# Patient Record
Sex: MALE | Race: WHITE | NOT HISPANIC OR LATINO | Employment: OTHER | RURAL
[De-identification: names, ages, dates, MRNs, and addresses within clinical notes are randomized per-mention and may not be internally consistent; named-entity substitution may affect disease eponyms.]

---

## 2017-06-08 ENCOUNTER — OFFICE VISIT (OUTPATIENT)
Dept: ENDOCRINOLOGY | Facility: CLINIC | Age: 67
End: 2017-06-08

## 2017-06-08 VITALS
BODY MASS INDEX: 36.84 KG/M2 | DIASTOLIC BLOOD PRESSURE: 78 MMHG | HEIGHT: 73 IN | SYSTOLIC BLOOD PRESSURE: 120 MMHG | WEIGHT: 278 LBS | HEART RATE: 136 BPM

## 2017-06-08 DIAGNOSIS — E78.2 MIXED HYPERLIPIDEMIA: ICD-10-CM

## 2017-06-08 DIAGNOSIS — I10 ESSENTIAL HYPERTENSION: ICD-10-CM

## 2017-06-08 DIAGNOSIS — IMO0002 UNCONTROLLED TYPE 2 DIABETES MELLITUS WITH COMPLICATION, WITH LONG-TERM CURRENT USE OF INSULIN: Primary | ICD-10-CM

## 2017-06-08 PROCEDURE — 99214 OFFICE O/P EST MOD 30 MIN: CPT | Performed by: NURSE PRACTITIONER

## 2017-06-08 RX ORDER — AMIODARONE HYDROCHLORIDE 200 MG/1
200 TABLET ORAL 2 TIMES DAILY
COMMUNITY
Start: 2017-05-17

## 2017-06-08 RX ORDER — POTASSIUM CHLORIDE 1500 MG/1
20 TABLET, FILM COATED, EXTENDED RELEASE ORAL 2 TIMES DAILY
COMMUNITY
Start: 2017-04-17 | End: 2018-01-22 | Stop reason: SDUPTHER

## 2017-06-08 RX ORDER — RIVAROXABAN 15 MG/1
15 TABLET, FILM COATED ORAL DAILY
COMMUNITY
Start: 2017-05-17

## 2017-06-08 RX ORDER — METOLAZONE 5 MG/1
5 TABLET ORAL AS NEEDED
COMMUNITY
Start: 2017-04-17

## 2017-06-08 RX ORDER — PREDNISONE 10 MG/1
10 TABLET ORAL DAILY
COMMUNITY
Start: 2017-03-15

## 2017-06-08 RX ORDER — BUMETANIDE 2 MG/1
2 TABLET ORAL 2 TIMES DAILY
COMMUNITY
Start: 2017-04-24

## 2017-06-08 RX ORDER — CLINDAMYCIN HYDROCHLORIDE 300 MG/1
300 CAPSULE ORAL 2 TIMES DAILY
COMMUNITY
Start: 2017-06-02

## 2017-06-08 NOTE — PROGRESS NOTES
Ginger Hardwick is a 66 y.o. male. he is here today for follow-up.    History of Present Illness      Duration/Timing:Diabetes mellitus type 2, age at onset of diabetes: 50 years, onset of symptoms gradual      timing constant       Quality not controlled  But improved       Severity high    Recent hospital admission since last visit     #1 cellulitis left leg  #2 edema  #3 diarrhea     Off pump during each visit     Currently on antibiotics           Severity (Complications/Hospitalizations)  Secondary Macrovascular Complications: No CAD, No CVA, No PAD  Secondary Microvascular Complications:Diabetic nephropathy, proteinuria, diabetic neuropathy, no diabetic retinopathy     Context  Diabetes Regimen:Insulin, last HgbA1c 7.4% from July 2016  Blood Glucose Readings       Now on medtronic insulin pump       Downloaded and average bg - 155    No low sugars     Exercise:Does not execise     Associated Signs/Symptoms  Hyperglycemic Symptoms:polyuria, polydipsia, polyphagia, weight gain  Hypoglycemic Episodes:NO documented hypoglycemia since last visit; has  hypoglycemia unawareness     Aggravating , prednisone for SLE       The following portions of the patient's history were reviewed and updated as appropriate:   Past Medical History:   Diagnosis Date   • Diabetic neuropathy    • Elevated blood pressure    • Hypercholesterolemia    • Lupus erythematosus    • Tobacco user    • Type 2 diabetes mellitus with hyperglycemia    • Type 2 diabetes mellitus, uncontrolled      Past Surgical History:   Procedure Laterality Date   • CARDIAC CATHETERIZATION  03/26/2014    No epicardial coronary artery disease. Normal LV systolic function. EF 55%. No wall motion abnormalities. Systemic hypertension.     Family History   Problem Relation Age of Onset   • Diabetes Other        Current Outpatient Prescriptions   Medication Sig Dispense Refill   • amiodarone (PACERONE) 200 MG tablet Take 200 mg by mouth 2 (Two) Times a Day.      • aspirin 81 MG chewable tablet Chew 81 mg daily.     • atorvastatin (LIPITOR) 80 MG tablet      • budesonide-formoterol (SYMBICORT) 160-4.5 MCG/ACT inhaler Inhale 2 puffs 2 (two) times a day.     • bumetanide (BUMEX) 2 MG tablet Take 2 mg by mouth 2 (Two) Times a Day.     • carvedilol (COREG) 25 MG tablet Take 25 mg by mouth 2 (two) times a day with meals.     • cetirizine (ZyrTEC) 10 MG tablet Take 10 mg by mouth daily.     • clindamycin (CLEOCIN) 300 MG capsule Take 300 mg by mouth 2 (Two) Times a Day.     • cloNIDine (CATAPRES) 0.2 MG tablet Take 0.2 mg by mouth 2 (two) times a day.     • esomeprazole (nexIUM) 20 MG capsule Take 20 mg by mouth Daily.     • fenofibrate 160 MG tablet      • furosemide (LASIX) 40 MG tablet Take 40 mg by mouth daily.     • HYDROcodone-acetaminophen (NORCO) 7.5-325 MG per tablet      • insulin detemir (LEVEMIR) 100 UNIT/ML injection 50 units daily 2 each 11   • insulin lispro (humaLOG) 100 UNIT/ML injection Use 150 units thru insulin pump per day 50 mL 11   • LYRICA 150 MG capsule Take 150 capsules by mouth 2 (Two) Times a Day.     • LYRICA 75 MG capsule Take 75 mg by mouth 2 (Two) Times a Day.     • metolazone (ZAROXOLYN) 2.5 MG tablet Take 2.5 mg by mouth.     • metOLazone (ZAROXOLYN) 5 MG tablet Take 5 mg by mouth As Needed.     • Multiple Vitamins-Minerals (DAILY MULTIVITAMIN PO) Take 1 capsule by mouth daily.     • mycophenolate (CELLCEPT) 500 MG tablet Take  by mouth 3 (three) times a day.     • Omega-3 Fatty Acids (FISH OIL) 1000 MG capsule capsule Take 1,000 mg by mouth 2 (two) times a day.     • potassium chloride (K-DUR) 10 MEQ CR tablet      • potassium chloride (K-DUR,KLOR-CON) 10 MEQ CR tablet      • potassium chloride (MICRO-K) 10 MEQ CR capsule Take 10 mEq by mouth 2 (two) times a day.     • potassium chloride ER (K-TAB) 20 MEQ tablet controlled-release ER tablet Take 20 mEq by mouth 2 (Two) Times a Day.     • predniSONE (DELTASONE) 10 MG tablet Take 10 mg by mouth  "Daily.     • predniSONE (DELTASONE) 5 MG tablet Take 5 mg by mouth daily.     • PROAIR  (90 BASE) MCG/ACT inhaler      • SYMBICORT 80-4.5 MCG/ACT inhaler      • tamsulosin (FLOMAX) 0.4 MG capsule 24 hr capsule Take 1 capsule by mouth every night.     • TRUEPLUS INSULIN SYRINGE 31G X 5/16\" 1 ML misc      • XARELTO 15 MG tablet Take 15 mg by mouth Daily.     • ZETIA 10 MG tablet      • zolpidem (AMBIEN) 10 MG tablet        No current facility-administered medications for this visit.      No Known Allergies  Social History     Social History   • Marital status:      Spouse name: N/A   • Number of children: N/A   • Years of education: N/A     Social History Main Topics   • Smoking status: Current Every Day Smoker   • Smokeless tobacco: Never Used   • Alcohol use No   • Drug use: None   • Sexual activity: Not Asked     Other Topics Concern   • None     Social History Narrative       Review of Systems  Review of Systems   Constitutional: Negative for activity change, appetite change, chills, diaphoresis and fatigue.   HENT: Negative for congestion, dental problem, drooling, ear discharge, ear pain, facial swelling, sneezing, sore throat, tinnitus, trouble swallowing and voice change.    Eyes: Negative for photophobia, pain, discharge, redness, itching and visual disturbance.   Respiratory: Negative for apnea, cough, choking, chest tightness and shortness of breath.    Cardiovascular: Negative for chest pain, palpitations and leg swelling.   Gastrointestinal: Negative for abdominal distention, abdominal pain, constipation, diarrhea, nausea and vomiting.   Endocrine: Negative for cold intolerance, heat intolerance, polydipsia, polyphagia and polyuria.   Genitourinary: Negative for difficulty urinating, dysuria, frequency, hematuria and urgency.   Musculoskeletal: Negative for arthralgias, back pain, gait problem, joint swelling, myalgias, neck pain and neck stiffness.   Skin: Negative for color change, pallor, " "rash and wound.   Allergic/Immunologic: Negative for environmental allergies, food allergies and immunocompromised state.   Neurological: Negative for dizziness, tremors, facial asymmetry, weakness, light-headedness, numbness and headaches.   Hematological: Negative for adenopathy. Does not bruise/bleed easily.   Psychiatric/Behavioral: Negative for agitation, behavioral problems, confusion, decreased concentration and sleep disturbance.        Objective    /78 (BP Location: Left arm, Patient Position: Sitting, Cuff Size: Adult)  Pulse (!) 136  Ht 73\" (185.4 cm)  Wt 278 lb (126 kg)  BMI 36.68 kg/m2  Physical Exam   Constitutional: He is oriented to person, place, and time. He appears well-developed and well-nourished. No distress.   HENT:   Head: Normocephalic and atraumatic.   Right Ear: External ear normal.   Left Ear: External ear normal.   Nose: Nose normal.   Eyes: Conjunctivae and EOM are normal. Pupils are equal, round, and reactive to light.   Neck: Normal range of motion. Neck supple. No tracheal deviation present. No thyromegaly present.   Cardiovascular: Normal rate, regular rhythm and normal heart sounds.    No murmur heard.  Pulmonary/Chest: Effort normal and breath sounds normal. No respiratory distress. He has no wheezes.   Abdominal: Soft. Bowel sounds are normal. There is no tenderness. There is no rebound and no guarding.   Musculoskeletal: Normal range of motion. He exhibits no edema, tenderness or deformity.   Neurological: He is alert and oriented to person, place, and time. No cranial nerve deficit.   Skin: Skin is warm and dry. No rash noted.   Psychiatric: He has a normal mood and affect. His behavior is normal. Judgment and thought content normal.       Lab Review  Glucose (mg/dl)   Date Value   07/11/2016 148 (H)   03/26/2014 101 (H)   03/25/2014 135 (H)     Sodium (mmol/L)   Date Value   07/11/2016 139   03/26/2014 138   03/25/2014 138     Potassium (mmol/L)   Date Value "   07/11/2016 3.8   03/26/2014 3.9   03/25/2014 3.6     Chloride (mmol/L)   Date Value   07/11/2016 97   03/26/2014 101   03/25/2014 97     CO2 (mmol/L)   Date Value   07/11/2016 35 (H)   03/26/2014 31   03/25/2014 32 (H)     BUN (mg/dl)   Date Value   07/11/2016 43 (H)   03/26/2014 22 (H)   03/25/2014 25 (H)     Creatinine (mg/dl)   Date Value   07/11/2016 1.5 (H)   03/26/2014 1.4 (H)   03/25/2014 1.5 (H)     Hemoglobin A1C (%TotHgb)   Date Value   07/11/2016 7.4 (H)     Triglycerides (mg/dl)   Date Value   07/11/2016 357 (H)   03/26/2014 371 (H)       Assessment/Plan      1. Uncontrolled type 2 diabetes mellitus with complication, with long-term current use of insulin    2. Mixed hyperlipidemia    3. Essential hypertension    .    Medications prescribed:  Outpatient Encounter Prescriptions as of 6/8/2017   Medication Sig Dispense Refill   • amiodarone (PACERONE) 200 MG tablet Take 200 mg by mouth 2 (Two) Times a Day.     • aspirin 81 MG chewable tablet Chew 81 mg daily.     • atorvastatin (LIPITOR) 80 MG tablet      • budesonide-formoterol (SYMBICORT) 160-4.5 MCG/ACT inhaler Inhale 2 puffs 2 (two) times a day.     • bumetanide (BUMEX) 2 MG tablet Take 2 mg by mouth 2 (Two) Times a Day.     • carvedilol (COREG) 25 MG tablet Take 25 mg by mouth 2 (two) times a day with meals.     • cetirizine (ZyrTEC) 10 MG tablet Take 10 mg by mouth daily.     • clindamycin (CLEOCIN) 300 MG capsule Take 300 mg by mouth 2 (Two) Times a Day.     • cloNIDine (CATAPRES) 0.2 MG tablet Take 0.2 mg by mouth 2 (two) times a day.     • esomeprazole (nexIUM) 20 MG capsule Take 20 mg by mouth Daily.     • fenofibrate 160 MG tablet      • furosemide (LASIX) 40 MG tablet Take 40 mg by mouth daily.     • HYDROcodone-acetaminophen (NORCO) 7.5-325 MG per tablet      • insulin detemir (LEVEMIR) 100 UNIT/ML injection 50 units daily 2 each 11   • insulin lispro (humaLOG) 100 UNIT/ML injection Use 150 units thru insulin pump per day 50 mL 11   • LYRICA  "150 MG capsule Take 150 capsules by mouth 2 (Two) Times a Day.     • LYRICA 75 MG capsule Take 75 mg by mouth 2 (Two) Times a Day.     • metolazone (ZAROXOLYN) 2.5 MG tablet Take 2.5 mg by mouth.     • metOLazone (ZAROXOLYN) 5 MG tablet Take 5 mg by mouth As Needed.     • Multiple Vitamins-Minerals (DAILY MULTIVITAMIN PO) Take 1 capsule by mouth daily.     • mycophenolate (CELLCEPT) 500 MG tablet Take  by mouth 3 (three) times a day.     • Omega-3 Fatty Acids (FISH OIL) 1000 MG capsule capsule Take 1,000 mg by mouth 2 (two) times a day.     • potassium chloride (K-DUR) 10 MEQ CR tablet      • potassium chloride (K-DUR,KLOR-CON) 10 MEQ CR tablet      • potassium chloride (MICRO-K) 10 MEQ CR capsule Take 10 mEq by mouth 2 (two) times a day.     • potassium chloride ER (K-TAB) 20 MEQ tablet controlled-release ER tablet Take 20 mEq by mouth 2 (Two) Times a Day.     • predniSONE (DELTASONE) 10 MG tablet Take 10 mg by mouth Daily.     • predniSONE (DELTASONE) 5 MG tablet Take 5 mg by mouth daily.     • PROAIR  (90 BASE) MCG/ACT inhaler      • SYMBICORT 80-4.5 MCG/ACT inhaler      • tamsulosin (FLOMAX) 0.4 MG capsule 24 hr capsule Take 1 capsule by mouth every night.     • TRUEPLUS INSULIN SYRINGE 31G X 5/16\" 1 ML misc      • XARELTO 15 MG tablet Take 15 mg by mouth Daily.     • ZETIA 10 MG tablet      • zolpidem (AMBIEN) 10 MG tablet        No facility-administered encounter medications on file as of 6/8/2017.        Orders placed during this encounter include:  No orders of the defined types were placed in this encounter.    Glycemic Management        Now on Medtronic insulin pump         BASAL -        1.9 -- 2 u per hour         Carb ratio     5 - change to 4.5        Sensitivity         20        Target BG        120 to 140        Previous regimen          Levemir 40 units and 45 units pm          Humalog  1 unit per every 5 grams of CHO      Lipid Management        No results found for: CHOL        Lab Results "   Component Value Date     TRIG 357 (H) 07/11/2016     TRIG 371 (H) 03/26/2014            Lab Results   Component Value Date     HDL 37 (L) 07/11/2016     HDL 26 (L) 03/26/2014            Lab Results   Component Value Date     LDLCALC 62 07/11/2016     LDLCALC 41 03/26/2014            Simvastatin  80 mg qhs    triglice  160 mg daily   zetia 10 mg daily     Blood Pressure Management:target blood pressure            Lab Results   Component Value Date     GLUCOSE 148 (H) 07/11/2016     CALCIUM 9.2 07/11/2016      07/11/2016     K 3.8 07/11/2016     CO2 35 (H) 07/11/2016     CL 97 07/11/2016     BUN 43 (H) 07/11/2016     CREATININE 1.5 (H) 07/11/2016     ANIONGAP 7.0 07/11/2016            Lab Results   Component Value Date     GLUCOSE 148 (H) 07/11/2016     BUN 43 (H) 07/11/2016     CREATININE 1.5 (H) 07/11/2016     CO2 35 (H) 07/11/2016     CALCIUM 9.2 07/11/2016     ALBUMIN 3.1 (L) 07/11/2016     AST 33 07/11/2016     ALT 52 07/11/2016            on clonidine , lasix and coreg      follows with nephrology       On metolazone 2.5 mg three times weekly      On lasix 40 mg daily      Wheezing, not smoking     Take lasix 40 bid for 3 days      Microvascular Complication Monitoring: Microalbuminuria, No Diabetic Retinopathy, Diabetic Neuropathy       CKD stage III, nephrotic sx       Immunizations: Last pneumococcal immunization 2016  Preventive Care:patient is not smoking   Weight Related:Obesity , counseled nutrition, and physical activity   Other Diabetes Related Aspects               Lab Results   Component Value Date     TSH 1.76 07/11/2016            Lab Results   Component Value Date     [id number] 269 07/11/2016                4. Follow-up: Return in about 3 months (around 9/8/2017) for Recheck.

## 2017-09-22 ENCOUNTER — OFFICE VISIT (OUTPATIENT)
Dept: ENDOCRINOLOGY | Facility: CLINIC | Age: 67
End: 2017-09-22

## 2017-09-22 VITALS
SYSTOLIC BLOOD PRESSURE: 124 MMHG | BODY MASS INDEX: 36.45 KG/M2 | WEIGHT: 275 LBS | HEIGHT: 73 IN | HEART RATE: 88 BPM | DIASTOLIC BLOOD PRESSURE: 90 MMHG

## 2017-09-22 DIAGNOSIS — IMO0002 UNCONTROLLED TYPE 2 DIABETES MELLITUS WITH COMPLICATION, WITH LONG-TERM CURRENT USE OF INSULIN: Primary | ICD-10-CM

## 2017-09-22 DIAGNOSIS — E78.2 MIXED HYPERLIPIDEMIA: ICD-10-CM

## 2017-09-22 DIAGNOSIS — I10 ESSENTIAL HYPERTENSION: ICD-10-CM

## 2017-09-22 PROCEDURE — 99214 OFFICE O/P EST MOD 30 MIN: CPT | Performed by: NURSE PRACTITIONER

## 2017-09-22 PROCEDURE — PTNOCHG PR CUSTOM PT NO CHARGE VISIT: Performed by: NURSE PRACTITIONER

## 2017-09-22 RX ORDER — LOSARTAN POTASSIUM 100 MG/1
TABLET ORAL
Refills: 11 | COMMUNITY
Start: 2017-08-14 | End: 2018-04-23 | Stop reason: DRUGHIGH

## 2017-09-22 RX ORDER — DULOXETIN HYDROCHLORIDE 30 MG/1
CAPSULE, DELAYED RELEASE ORAL
COMMUNITY
Start: 2017-09-07

## 2017-09-22 RX ORDER — ERGOCALCIFEROL 1.25 MG/1
CAPSULE ORAL
Refills: 12 | COMMUNITY
Start: 2017-08-23 | End: 2018-08-31 | Stop reason: SDUPTHER

## 2017-09-22 NOTE — PROGRESS NOTES
Ginger Hardwick is a 66 y.o. male. he is here today for follow-up.    History of Present Illness       Duration/Timing:Diabetes mellitus type 2, age at onset of diabetes: 50 years, onset of symptoms gradual      timing constant       Quality not controlled  But improved       Severity high     Recent hospital admission since last visit      #1 cellulitis left leg  #2 edema  #3 diarrhea      Off pump during each visit      Currently on antibiotics             Severity (Complications/Hospitalizations)  Secondary Macrovascular Complications: No CAD, No CVA, No PAD  Secondary Microvascular Complications:Diabetic nephropathy, proteinuria, diabetic neuropathy, no diabetic retinopathy     Context  Diabetes Regimen:Insulin, last HgbA1c 7.4% from July 2016  Blood Glucose Readings       Now on medtronic insulin pump       Downloaded and average bg - 154     Waking up 139 up to 180 s      Exercise:Does not execise     Associated Signs/Symptoms  Hyperglycemic Symptoms:polyuria, polydipsia, polyphagia, weight gain  Hypoglycemic Episodes:NO documented hypoglycemia since last visit; has  hypoglycemia unawareness     Aggravating , prednisone for SLE             The following portions of the patient's history were reviewed and updated as appropriate:   Past Medical History:   Diagnosis Date   • Diabetic neuropathy    • Elevated blood pressure    • Hypercholesterolemia    • Lupus erythematosus    • Tobacco user    • Type 2 diabetes mellitus with hyperglycemia    • Type 2 diabetes mellitus, uncontrolled      Past Surgical History:   Procedure Laterality Date   • CARDIAC CATHETERIZATION  03/26/2014    No epicardial coronary artery disease. Normal LV systolic function. EF 55%. No wall motion abnormalities. Systemic hypertension.     Family History   Problem Relation Age of Onset   • Diabetes Other        Current Outpatient Prescriptions   Medication Sig Dispense Refill   • amiodarone (PACERONE) 200 MG tablet Take 200 mg by  mouth 2 (Two) Times a Day.     • aspirin 81 MG chewable tablet Chew 81 mg daily.     • atorvastatin (LIPITOR) 80 MG tablet      • budesonide-formoterol (SYMBICORT) 160-4.5 MCG/ACT inhaler Inhale 2 puffs 2 (two) times a day.     • bumetanide (BUMEX) 2 MG tablet Take 2 mg by mouth 2 (Two) Times a Day.     • carvedilol (COREG) 25 MG tablet Take 25 mg by mouth 2 (two) times a day with meals.     • cetirizine (ZyrTEC) 10 MG tablet Take 10 mg by mouth daily.     • clindamycin (CLEOCIN) 300 MG capsule Take 300 mg by mouth 2 (Two) Times a Day.     • cloNIDine (CATAPRES) 0.2 MG tablet Take 0.2 mg by mouth 2 (two) times a day.     • DULoxetine (CYMBALTA) 30 MG capsule      • esomeprazole (nexIUM) 20 MG capsule Take 20 mg by mouth Daily.     • fenofibrate 160 MG tablet      • furosemide (LASIX) 40 MG tablet Take 40 mg by mouth daily.     • HYDROcodone-acetaminophen (NORCO) 7.5-325 MG per tablet      • insulin detemir (LEVEMIR) 100 UNIT/ML injection 50 units daily 2 each 11   • insulin lispro (humaLOG) 100 UNIT/ML injection Use 150 units thru insulin pump per day 50 mL 11   • losartan (COZAAR) 100 MG tablet TAKE 1 TABLET BY MOUTH EVERY DAY  11   • LYRICA 150 MG capsule Take 150 capsules by mouth 2 (Two) Times a Day.     • LYRICA 75 MG capsule Take 75 mg by mouth 2 (Two) Times a Day.     • metolazone (ZAROXOLYN) 2.5 MG tablet Take 2.5 mg by mouth.     • metOLazone (ZAROXOLYN) 5 MG tablet Take 5 mg by mouth As Needed.     • metoprolol tartrate (LOPRESSOR) 25 MG tablet      • Multiple Vitamins-Minerals (DAILY MULTIVITAMIN PO) Take 1 capsule by mouth daily.     • mycophenolate (CELLCEPT) 500 MG tablet Take  by mouth 3 (three) times a day.     • Omega-3 Fatty Acids (FISH OIL) 1000 MG capsule capsule Take 1,000 mg by mouth 2 (two) times a day.     • potassium chloride (K-DUR) 10 MEQ CR tablet      • potassium chloride (K-DUR,KLOR-CON) 10 MEQ CR tablet      • potassium chloride (MICRO-K) 10 MEQ CR capsule Take 10 mEq by mouth 2 (two)  "times a day.     • potassium chloride ER (K-TAB) 20 MEQ tablet controlled-release ER tablet Take 20 mEq by mouth 2 (Two) Times a Day.     • predniSONE (DELTASONE) 10 MG tablet Take 10 mg by mouth Daily.     • predniSONE (DELTASONE) 5 MG tablet Take 5 mg by mouth daily.     • PROAIR  (90 BASE) MCG/ACT inhaler      • SYMBICORT 80-4.5 MCG/ACT inhaler      • tamsulosin (FLOMAX) 0.4 MG capsule 24 hr capsule Take 1 capsule by mouth every night.     • TRUEPLUS INSULIN SYRINGE 31G X 5/16\" 1 ML misc      • vitamin D (ERGOCALCIFEROL) 12992 units capsule capsule TAKE ONE CAPSULE BY MOUTH EVERY WEEK -SWALLOW WHOLE. DO NOT CHEW ORCRUSH.  12   • XARELTO 15 MG tablet Take 15 mg by mouth Daily.     • ZETIA 10 MG tablet      • zolpidem (AMBIEN) 10 MG tablet        No current facility-administered medications for this visit.      No Known Allergies  Social History     Social History   • Marital status:      Spouse name: N/A   • Number of children: N/A   • Years of education: N/A     Social History Main Topics   • Smoking status: Current Every Day Smoker   • Smokeless tobacco: Never Used      Comment: encouraged smoking cessation    • Alcohol use No   • Drug use: None   • Sexual activity: Not Asked     Other Topics Concern   • None     Social History Narrative       Review of Systems  Review of Systems   Constitutional: Negative for activity change, appetite change, diaphoresis and fatigue.   HENT: Negative for congestion, dental problem, drooling, facial swelling, sneezing, sore throat, tinnitus, trouble swallowing and voice change.    Eyes: Negative for photophobia, pain, discharge, redness, itching and visual disturbance.   Respiratory: Negative for apnea, cough, choking, chest tightness and shortness of breath.    Cardiovascular: Negative for chest pain, palpitations and leg swelling.   Gastrointestinal: Negative for abdominal distention, abdominal pain, constipation, diarrhea, nausea and vomiting.   Endocrine: " "Negative for cold intolerance, heat intolerance, polydipsia, polyphagia and polyuria.   Genitourinary: Negative for difficulty urinating, dysuria, frequency, hematuria and urgency.   Musculoskeletal: Negative for arthralgias, back pain, gait problem, joint swelling, myalgias, neck pain and neck stiffness.   Skin: Negative for color change, pallor, rash and wound.   Allergic/Immunologic: Negative for environmental allergies and food allergies.   Neurological: Negative for dizziness, tremors, facial asymmetry, weakness, light-headedness, numbness and headaches.   Hematological: Negative for adenopathy. Does not bruise/bleed easily.   Psychiatric/Behavioral: Negative for agitation, behavioral problems, confusion and sleep disturbance.        Objective    /90 (BP Location: Right arm, Patient Position: Sitting, Cuff Size: Adult)  Pulse 88  Ht 73\" (185.4 cm)  Wt 275 lb (125 kg)  BMI 36.28 kg/m2  Physical Exam   Constitutional: He is oriented to person, place, and time. He appears well-developed and well-nourished. No distress.   HENT:   Head: Normocephalic and atraumatic.   Right Ear: External ear normal.   Left Ear: External ear normal.   Nose: Nose normal.   Eyes: Conjunctivae and EOM are normal. Pupils are equal, round, and reactive to light.   Neck: Normal range of motion. Neck supple. No tracheal deviation present. No thyromegaly present.   Cardiovascular: Normal rate, regular rhythm and normal heart sounds.    No murmur heard.  Pulmonary/Chest: Effort normal and breath sounds normal. No respiratory distress. He has no wheezes.   Abdominal: Soft. Bowel sounds are normal. There is no tenderness. There is no rebound and no guarding.   Musculoskeletal: Normal range of motion. He exhibits no edema, tenderness or deformity.   Neurological: He is alert and oriented to person, place, and time. No cranial nerve deficit.   Skin: Skin is warm and dry. No rash noted.   Psychiatric: He has a normal mood and affect. His " behavior is normal. Judgment and thought content normal.       Lab Review  Glucose (mg/dl)   Date Value   07/11/2016 148 (H)   03/26/2014 101 (H)   03/25/2014 135 (H)     Sodium (mmol/L)   Date Value   07/11/2016 139   03/26/2014 138   03/25/2014 138     Potassium (mmol/L)   Date Value   07/11/2016 3.8   03/26/2014 3.9   03/25/2014 3.6     Chloride (mmol/L)   Date Value   07/11/2016 97   03/26/2014 101   03/25/2014 97     CO2 (mmol/L)   Date Value   07/11/2016 35 (H)   03/26/2014 31   03/25/2014 32 (H)     BUN (mg/dl)   Date Value   07/11/2016 43 (H)   03/26/2014 22 (H)   03/25/2014 25 (H)     Creatinine (mg/dl)   Date Value   07/11/2016 1.5 (H)   03/26/2014 1.4 (H)   03/25/2014 1.5 (H)     Hemoglobin A1C (%TotHgb)   Date Value   07/11/2016 7.4 (H)     Triglycerides (mg/dl)   Date Value   07/11/2016 357 (H)   03/26/2014 371 (H)       Assessment/Plan      1. Uncontrolled type 2 diabetes mellitus with complication, with long-term current use of insulin    2. Mixed hyperlipidemia    3. Essential hypertension    .    Medications prescribed:  Outpatient Encounter Prescriptions as of 9/22/2017   Medication Sig Dispense Refill   • amiodarone (PACERONE) 200 MG tablet Take 200 mg by mouth 2 (Two) Times a Day.     • aspirin 81 MG chewable tablet Chew 81 mg daily.     • atorvastatin (LIPITOR) 80 MG tablet      • budesonide-formoterol (SYMBICORT) 160-4.5 MCG/ACT inhaler Inhale 2 puffs 2 (two) times a day.     • bumetanide (BUMEX) 2 MG tablet Take 2 mg by mouth 2 (Two) Times a Day.     • carvedilol (COREG) 25 MG tablet Take 25 mg by mouth 2 (two) times a day with meals.     • cetirizine (ZyrTEC) 10 MG tablet Take 10 mg by mouth daily.     • clindamycin (CLEOCIN) 300 MG capsule Take 300 mg by mouth 2 (Two) Times a Day.     • cloNIDine (CATAPRES) 0.2 MG tablet Take 0.2 mg by mouth 2 (two) times a day.     • DULoxetine (CYMBALTA) 30 MG capsule      • esomeprazole (nexIUM) 20 MG capsule Take 20 mg by mouth Daily.     • fenofibrate  "160 MG tablet      • furosemide (LASIX) 40 MG tablet Take 40 mg by mouth daily.     • HYDROcodone-acetaminophen (NORCO) 7.5-325 MG per tablet      • insulin detemir (LEVEMIR) 100 UNIT/ML injection 50 units daily 2 each 11   • insulin lispro (humaLOG) 100 UNIT/ML injection Use 150 units thru insulin pump per day 50 mL 11   • losartan (COZAAR) 100 MG tablet TAKE 1 TABLET BY MOUTH EVERY DAY  11   • LYRICA 150 MG capsule Take 150 capsules by mouth 2 (Two) Times a Day.     • LYRICA 75 MG capsule Take 75 mg by mouth 2 (Two) Times a Day.     • metolazone (ZAROXOLYN) 2.5 MG tablet Take 2.5 mg by mouth.     • metOLazone (ZAROXOLYN) 5 MG tablet Take 5 mg by mouth As Needed.     • metoprolol tartrate (LOPRESSOR) 25 MG tablet      • Multiple Vitamins-Minerals (DAILY MULTIVITAMIN PO) Take 1 capsule by mouth daily.     • mycophenolate (CELLCEPT) 500 MG tablet Take  by mouth 3 (three) times a day.     • Omega-3 Fatty Acids (FISH OIL) 1000 MG capsule capsule Take 1,000 mg by mouth 2 (two) times a day.     • potassium chloride (K-DUR) 10 MEQ CR tablet      • potassium chloride (K-DUR,KLOR-CON) 10 MEQ CR tablet      • potassium chloride (MICRO-K) 10 MEQ CR capsule Take 10 mEq by mouth 2 (two) times a day.     • potassium chloride ER (K-TAB) 20 MEQ tablet controlled-release ER tablet Take 20 mEq by mouth 2 (Two) Times a Day.     • predniSONE (DELTASONE) 10 MG tablet Take 10 mg by mouth Daily.     • predniSONE (DELTASONE) 5 MG tablet Take 5 mg by mouth daily.     • PROAIR  (90 BASE) MCG/ACT inhaler      • SYMBICORT 80-4.5 MCG/ACT inhaler      • tamsulosin (FLOMAX) 0.4 MG capsule 24 hr capsule Take 1 capsule by mouth every night.     • TRUEPLUS INSULIN SYRINGE 31G X 5/16\" 1 ML misc      • vitamin D (ERGOCALCIFEROL) 38879 units capsule capsule TAKE ONE CAPSULE BY MOUTH EVERY WEEK -SWALLOW WHOLE. DO NOT CHEW ORCRUSH.  12   • XARELTO 15 MG tablet Take 15 mg by mouth Daily.     • ZETIA 10 MG tablet      • zolpidem (AMBIEN) 10 MG " tablet        No facility-administered encounter medications on file as of 9/22/2017.        Orders placed during this encounter include:  No orders of the defined types were placed in this encounter.    Glycemic Management        Now on [employer] insulin pump         BASAL -     MN - 2.1  8 am - 2.0            Carb ratio     5 - change to 4.5        Sensitivity         20        Target BG       120 to 140        Previous regimen          Levemir 40 units and 45 units pm          Humalog  1 unit per every 5 grams of CHO      Lipid Management        No results found for: CHOL            Lab Results   Component Value Date     TRIG 357 (H) 07/11/2016     TRIG 371 (H) 03/26/2014                Lab Results   Component Value Date     HDL 37 (L) 07/11/2016     HDL 26 (L) 03/26/2014                Lab Results   Component Value Date     LDLCALC 62 07/11/2016     LDLCALC 41 03/26/2014            Simvastatin  80 mg qhs    triglice  160 mg daily   zetia 10 mg daily     Blood Pressure Management:target blood pressure                Lab Results   Component Value Date     GLUCOSE 148 (H) 07/11/2016     CALCIUM 9.2 07/11/2016      07/11/2016     K 3.8 07/11/2016     CO2 35 (H) 07/11/2016     CL 97 07/11/2016     BUN 43 (H) 07/11/2016     CREATININE 1.5 (H) 07/11/2016     ANIONGAP 7.0 07/11/2016                Lab Results   Component Value Date     GLUCOSE 148 (H) 07/11/2016     BUN 43 (H) 07/11/2016     CREATININE 1.5 (H) 07/11/2016     CO2 35 (H) 07/11/2016     CALCIUM 9.2 07/11/2016     ALBUMIN 3.1 (L) 07/11/2016     AST 33 07/11/2016     ALT 52 07/11/2016            on clonidine , lasix and coreg      follows with nephrology       On metolazone 2.5 mg three times weekly      On lasix 40 mg daily      Wheezing, not smoking     Take lasix 40 bid for 3 days      Microvascular Complication Monitoring: Microalbuminuria, No Diabetic Retinopathy, Diabetic Neuropathy       CKD stage III, nephrotic sx      Immunizations: Last  pneumococcal immunization 2016  Preventive Care:patient is not smoking   Weight Related:Obesity , counseled nutrition, and physical activity   Other Diabetes Related Aspects                   Lab Results   Component Value Date     TSH 1.76 07/11/2016                Lab Results   Component Value Date     [id number] 269 07/11/2016               4. Follow-up: Return in about 3 months (around 12/22/2017) for Recheck.

## 2017-09-22 NOTE — PROGRESS NOTES
Jovan Hardwick is a 66 y.o. male seen by diabetes educator 09/22/2017 to link glucometer to insulin pump. Was unable to link meter to pump. Called [employer]--completed troubleshooting. [employer] to send patient new glucometer. Provided patient with written instructions on how to link new meter to his pump.     Samantha Lyons MS, RD, LD, CDE

## 2018-01-10 ENCOUNTER — TELEPHONE (OUTPATIENT)
Dept: FAMILY MEDICINE CLINIC | Facility: CLINIC | Age: 68
End: 2018-01-10

## 2018-01-11 ENCOUNTER — TELEPHONE (OUTPATIENT)
Dept: ENDOCRINOLOGY | Facility: CLINIC | Age: 68
End: 2018-01-11

## 2018-01-19 DIAGNOSIS — IMO0002 UNCONTROLLED TYPE 2 DIABETES MELLITUS WITH COMPLICATION, WITH LONG-TERM CURRENT USE OF INSULIN: Primary | ICD-10-CM

## 2018-01-22 ENCOUNTER — OFFICE VISIT (OUTPATIENT)
Dept: ENDOCRINOLOGY | Facility: CLINIC | Age: 68
End: 2018-01-22

## 2018-01-22 VITALS
SYSTOLIC BLOOD PRESSURE: 126 MMHG | WEIGHT: 277 LBS | HEART RATE: 106 BPM | HEIGHT: 73 IN | DIASTOLIC BLOOD PRESSURE: 78 MMHG | BODY MASS INDEX: 36.71 KG/M2

## 2018-01-22 DIAGNOSIS — E78.2 MIXED HYPERLIPIDEMIA: ICD-10-CM

## 2018-01-22 DIAGNOSIS — IMO0002 UNCONTROLLED TYPE 2 DIABETES MELLITUS WITH COMPLICATION, WITH LONG-TERM CURRENT USE OF INSULIN: Primary | ICD-10-CM

## 2018-01-22 DIAGNOSIS — I10 ESSENTIAL HYPERTENSION: ICD-10-CM

## 2018-01-22 PROCEDURE — 99214 OFFICE O/P EST MOD 30 MIN: CPT | Performed by: NURSE PRACTITIONER

## 2018-01-22 NOTE — PROGRESS NOTES
Ginger Hardwick is a 67 y.o. male. he is here today for follow-up.    History of Present Illness       Duration/Timing:Diabetes mellitus type 2, age at onset of diabetes: 50 years, onset of symptoms gradual      timing constant       Quality not controlled  But improved       Severity high     Recent hospital admission since last visit      #1 cellulitis left leg  #2 edema  #3 diarrhea      Off pump during each visit      Currently on antibiotics             Severity (Complications/Hospitalizations)  Secondary Macrovascular Complications: No CAD, No CVA, No PAD  Secondary Microvascular Complications:Diabetic nephropathy, proteinuria, diabetic neuropathy, no diabetic retinopathy     Context  Diabetes Regimen:Insulin, last HgbA1c 7.7% Jan. 2018   Blood Glucose Readings       Now on medtronic insulin pump       Downloaded and average bg - 155     No low blood sugars      Exercise:Does not execise     Associated Signs/Symptoms  Hyperglycemic Symptoms:polyuria, polydipsia, polyphagia, weight gain  Hypoglycemic Episodes:NO documented hypoglycemia since last visit; has  hypoglycemia unawareness     Aggravating , prednisone for SLE             The following portions of the patient's history were reviewed and updated as appropriate:   Past Medical History:   Diagnosis Date   • Diabetic neuropathy    • Elevated blood pressure    • Hypercholesterolemia    • Lupus erythematosus    • Tobacco user    • Type 2 diabetes mellitus with hyperglycemia    • Type 2 diabetes mellitus, uncontrolled      Past Surgical History:   Procedure Laterality Date   • CARDIAC CATHETERIZATION  03/26/2014    No epicardial coronary artery disease. Normal LV systolic function. EF 55%. No wall motion abnormalities. Systemic hypertension.     Family History   Problem Relation Age of Onset   • Diabetes Other        Current Outpatient Prescriptions   Medication Sig Dispense Refill   • amiodarone (PACERONE) 200 MG tablet Take 200 mg by mouth 2  "(Two) Times a Day.     • aspirin 81 MG chewable tablet Chew 81 mg daily.     • atorvastatin (LIPITOR) 80 MG tablet      • budesonide-formoterol (SYMBICORT) 160-4.5 MCG/ACT inhaler Inhale 2 puffs 2 (two) times a day.     • bumetanide (BUMEX) 2 MG tablet Take 2 mg by mouth 2 (Two) Times a Day.     • carvedilol (COREG) 25 MG tablet Take 25 mg by mouth 2 (two) times a day with meals.     • cetirizine (ZyrTEC) 10 MG tablet Take 10 mg by mouth daily.     • clindamycin (CLEOCIN) 300 MG capsule Take 300 mg by mouth 2 (Two) Times a Day.     • cloNIDine (CATAPRES) 0.2 MG tablet Take 0.2 mg by mouth 2 (two) times a day.     • DULoxetine (CYMBALTA) 30 MG capsule      • esomeprazole (nexIUM) 20 MG capsule Take 20 mg by mouth Daily.     • fenofibrate 160 MG tablet      • furosemide (LASIX) 40 MG tablet Take 40 mg by mouth daily.     • HUMALOG 100 UNIT/ML injection 150 UNITS THROUGH INSULIN PUMP PER DAY 50 mL 0   • HYDROcodone-acetaminophen (NORCO) 7.5-325 MG per tablet      • insulin detemir (LEVEMIR) 100 UNIT/ML injection 50 units daily 2 each 3   • losartan (COZAAR) 100 MG tablet TAKE 1 TABLET BY MOUTH EVERY DAY  11   • metolazone (ZAROXOLYN) 2.5 MG tablet Take 2.5 mg by mouth.     • metOLazone (ZAROXOLYN) 5 MG tablet Take 5 mg by mouth As Needed.     • metoprolol tartrate (LOPRESSOR) 25 MG tablet      • Multiple Vitamins-Minerals (DAILY MULTIVITAMIN PO) Take 1 capsule by mouth daily.     • mycophenolate (CELLCEPT) 500 MG tablet Take  by mouth 3 (three) times a day.     • Omega-3 Fatty Acids (FISH OIL) 1000 MG capsule capsule Take 1,000 mg by mouth 2 (two) times a day.     • potassium chloride (K-DUR) 10 MEQ CR tablet      • predniSONE (DELTASONE) 10 MG tablet Take 10 mg by mouth Daily.     • PROAIR  (90 BASE) MCG/ACT inhaler      • SYMBICORT 80-4.5 MCG/ACT inhaler      • tamsulosin (FLOMAX) 0.4 MG capsule 24 hr capsule Take 1 capsule by mouth every night.     • TRUEPLUS INSULIN SYRINGE 31G X 5/16\" 1 ML misc      • " vitamin D (ERGOCALCIFEROL) 19309 units capsule capsule TAKE ONE CAPSULE BY MOUTH EVERY WEEK -SWALLOW WHOLE. DO NOT CHEW ORCRUSH.  12   • XARELTO 15 MG tablet Take 15 mg by mouth Daily.     • ZETIA 10 MG tablet      • zolpidem (AMBIEN) 10 MG tablet        No current facility-administered medications for this visit.      No Known Allergies  Social History     Social History   • Marital status:      Spouse name: N/A   • Number of children: N/A   • Years of education: N/A     Social History Main Topics   • Smoking status: Current Every Day Smoker   • Smokeless tobacco: Never Used      Comment: encouraged smoking cessation    • Alcohol use No   • Drug use: None   • Sexual activity: Not Asked     Other Topics Concern   • None     Social History Narrative       Review of Systems  Review of Systems   Constitutional: Negative for activity change, appetite change, diaphoresis and fatigue.   HENT: Negative for congestion, dental problem, facial swelling, sneezing, sore throat, tinnitus, trouble swallowing and voice change.    Eyes: Negative for photophobia, pain, discharge, redness, itching and visual disturbance.   Respiratory: Negative for apnea, cough, choking, chest tightness and shortness of breath.    Cardiovascular: Negative for chest pain, palpitations and leg swelling.   Gastrointestinal: Negative for abdominal distention, abdominal pain, constipation, diarrhea, nausea and vomiting.   Endocrine: Negative for cold intolerance, heat intolerance, polydipsia, polyphagia and polyuria.   Genitourinary: Negative for difficulty urinating, dysuria, frequency, hematuria and urgency.   Musculoskeletal: Positive for gait problem. Negative for arthralgias, back pain, joint swelling, myalgias, neck pain and neck stiffness.   Skin: Negative for color change, pallor, rash and wound.   Allergic/Immunologic: Negative for environmental allergies and food allergies.   Neurological: Negative for dizziness, tremors, facial  "asymmetry, weakness, light-headedness, numbness and headaches.   Hematological: Negative for adenopathy. Does not bruise/bleed easily.   Psychiatric/Behavioral: Negative for agitation, behavioral problems, confusion and sleep disturbance.        Objective    /78 (BP Location: Right arm, Patient Position: Sitting, Cuff Size: Adult)  Pulse 106  Ht 185.4 cm (73\")  Wt 126 kg (277 lb)  BMI 36.55 kg/m2  Physical Exam   Constitutional: He is oriented to person, place, and time. He appears well-developed and well-nourished. No distress.   HENT:   Head: Normocephalic and atraumatic.   Right Ear: External ear normal.   Left Ear: External ear normal.   Nose: Nose normal.   Eyes: Conjunctivae and EOM are normal. Pupils are equal, round, and reactive to light.   Neck: Normal range of motion. Neck supple. No tracheal deviation present. No thyromegaly present.   Cardiovascular: Normal rate, regular rhythm and normal heart sounds.    No murmur heard.  Pulmonary/Chest: Effort normal and breath sounds normal. No respiratory distress. He has no wheezes.   Abdominal: Soft. Bowel sounds are normal. There is no tenderness. There is no rebound and no guarding.   Musculoskeletal: Normal range of motion. He exhibits no edema, tenderness or deformity.   Neurological: He is alert and oriented to person, place, and time. No cranial nerve deficit.   Skin: Skin is warm and dry. No rash noted.   Psychiatric: He has a normal mood and affect. His behavior is normal. Judgment and thought content normal.       Lab Review  Glucose (mg/dl)   Date Value   07/11/2016 148 (H)   03/26/2014 101 (H)   03/25/2014 135 (H)     Sodium (mmol/L)   Date Value   07/11/2016 139   03/26/2014 138   03/25/2014 138     Potassium (mmol/L)   Date Value   07/11/2016 3.8   03/26/2014 3.9   03/25/2014 3.6     Chloride (mmol/L)   Date Value   07/11/2016 97   03/26/2014 101   03/25/2014 97     CO2 (mmol/L)   Date Value   07/11/2016 35 (H)   03/26/2014 31   03/25/2014 " 32 (H)     BUN (mg/dl)   Date Value   07/11/2016 43 (H)   03/26/2014 22 (H)   03/25/2014 25 (H)     Creatinine (mg/dl)   Date Value   07/11/2016 1.5 (H)   03/26/2014 1.4 (H)   03/25/2014 1.5 (H)     Hemoglobin A1C (%TotHgb)   Date Value   07/11/2016 7.4 (H)     Triglycerides (mg/dl)   Date Value   07/11/2016 357 (H)   03/26/2014 371 (H)       Assessment/Plan      1. Uncontrolled type 2 diabetes mellitus with complication, with long-term current use of insulin    2. Mixed hyperlipidemia    3. Essential hypertension    .    Medications prescribed:  Outpatient Encounter Prescriptions as of 1/22/2018   Medication Sig Dispense Refill   • amiodarone (PACERONE) 200 MG tablet Take 200 mg by mouth 2 (Two) Times a Day.     • aspirin 81 MG chewable tablet Chew 81 mg daily.     • atorvastatin (LIPITOR) 80 MG tablet      • budesonide-formoterol (SYMBICORT) 160-4.5 MCG/ACT inhaler Inhale 2 puffs 2 (two) times a day.     • bumetanide (BUMEX) 2 MG tablet Take 2 mg by mouth 2 (Two) Times a Day.     • carvedilol (COREG) 25 MG tablet Take 25 mg by mouth 2 (two) times a day with meals.     • cetirizine (ZyrTEC) 10 MG tablet Take 10 mg by mouth daily.     • clindamycin (CLEOCIN) 300 MG capsule Take 300 mg by mouth 2 (Two) Times a Day.     • cloNIDine (CATAPRES) 0.2 MG tablet Take 0.2 mg by mouth 2 (two) times a day.     • DULoxetine (CYMBALTA) 30 MG capsule      • esomeprazole (nexIUM) 20 MG capsule Take 20 mg by mouth Daily.     • fenofibrate 160 MG tablet      • furosemide (LASIX) 40 MG tablet Take 40 mg by mouth daily.     • HUMALOG 100 UNIT/ML injection 150 UNITS THROUGH INSULIN PUMP PER DAY 50 mL 0   • HYDROcodone-acetaminophen (NORCO) 7.5-325 MG per tablet      • insulin detemir (LEVEMIR) 100 UNIT/ML injection 50 units daily 2 each 3   • losartan (COZAAR) 100 MG tablet TAKE 1 TABLET BY MOUTH EVERY DAY  11   • metolazone (ZAROXOLYN) 2.5 MG tablet Take 2.5 mg by mouth.     • metOLazone (ZAROXOLYN) 5 MG tablet Take 5 mg by mouth As  "Needed.     • metoprolol tartrate (LOPRESSOR) 25 MG tablet      • Multiple Vitamins-Minerals (DAILY MULTIVITAMIN PO) Take 1 capsule by mouth daily.     • mycophenolate (CELLCEPT) 500 MG tablet Take  by mouth 3 (three) times a day.     • Omega-3 Fatty Acids (FISH OIL) 1000 MG capsule capsule Take 1,000 mg by mouth 2 (two) times a day.     • potassium chloride (K-DUR) 10 MEQ CR tablet      • predniSONE (DELTASONE) 10 MG tablet Take 10 mg by mouth Daily.     • PROAIR  (90 BASE) MCG/ACT inhaler      • SYMBICORT 80-4.5 MCG/ACT inhaler      • tamsulosin (FLOMAX) 0.4 MG capsule 24 hr capsule Take 1 capsule by mouth every night.     • TRUEPLUS INSULIN SYRINGE 31G X 5/16\" 1 ML misc      • vitamin D (ERGOCALCIFEROL) 34376 units capsule capsule TAKE ONE CAPSULE BY MOUTH EVERY WEEK -SWALLOW WHOLE. DO NOT CHEW ORCRUSH.  12   • XARELTO 15 MG tablet Take 15 mg by mouth Daily.     • ZETIA 10 MG tablet      • zolpidem (AMBIEN) 10 MG tablet      • [DISCONTINUED] LYRICA 150 MG capsule Take 150 capsules by mouth 2 (Two) Times a Day.     • [DISCONTINUED] LYRICA 75 MG capsule Take 75 mg by mouth 2 (Two) Times a Day.     • [DISCONTINUED] potassium chloride (K-DUR,KLOR-CON) 10 MEQ CR tablet      • [DISCONTINUED] potassium chloride (MICRO-K) 10 MEQ CR capsule Take 10 mEq by mouth 2 (two) times a day.     • [DISCONTINUED] potassium chloride ER (K-TAB) 20 MEQ tablet controlled-release ER tablet Take 20 mEq by mouth 2 (Two) Times a Day.     • [DISCONTINUED] predniSONE (DELTASONE) 5 MG tablet Take 5 mg by mouth daily.       No facility-administered encounter medications on file as of 1/22/2018.        Orders placed during this encounter include:  No orders of the defined types were placed in this encounter.    Glycemic Management        Now on Medtronic insulin pump         BASAL -     MN - 2.1  8 am - 2.0             Carb ratio      4.5        Sensitivity         20        Target BG       120 to 140        Previous regimen          " Levemir 40 units and 45 units pm          Humalog  1 unit per every 5 grams of CHO      Lipid Management        No results found for: CHOL            Lab Results   Component Value Date     TRIG 357 (H) 07/11/2016     TRIG 371 (H) 03/26/2014                Lab Results   Component Value Date     HDL 37 (L) 07/11/2016     HDL 26 (L) 03/26/2014                Lab Results   Component Value Date     LDLCALC 62 07/11/2016     LDLCALC 41 03/26/2014            Simvastatin  80 mg qhs    triglice  160 mg daily   zetia 10 mg daily     Blood Pressure Management:target blood pressure                 on clonidine , lasix and coreg      follows with nephrology       On metolazone 2.5 mg three times weekly      On lasix 40 mg daily      Wheezing, not smoking     Take lasix 40 bid for 3 days      Microvascular Complication Monitoring: Microalbuminuria, No Diabetic Retinopathy, Diabetic Neuropathy       CKD stage III, nephrotic sx     Lyrica -- caused side effects     Gabapentin - caused side effects      Immunizations: Last pneumococcal immunization 2016  Preventive Care:patient is not smoking   Weight Related:Obesity , counseled nutrition, and physical activity   Other Diabetes Related Aspects                   Lab Results   Component Value Date     TSH 1.76 07/11/2016                Lab Results   Component Value Date     [id number] 269 07/11/2016      Diagnosed with Lupus     Taking prednisone        4. Follow-up: No Follow-up on file.

## 2018-02-01 DIAGNOSIS — Z79.4 CONTROLLED TYPE 2 DIABETES MELLITUS WITHOUT COMPLICATION, WITH LONG-TERM CURRENT USE OF INSULIN (HCC): Primary | ICD-10-CM

## 2018-02-01 DIAGNOSIS — E11.9 CONTROLLED TYPE 2 DIABETES MELLITUS WITHOUT COMPLICATION, WITH LONG-TERM CURRENT USE OF INSULIN (HCC): Primary | ICD-10-CM

## 2018-02-02 LAB
ANION GAP SERPL CALCULATED.3IONS-SCNC: 11 MMOL/L (ref 5–15)
BUN BLD-MCNC: 53 MG/DL (ref 7–21)
BUN/CREAT SERPL: 26.5 (ref 7–25)
CALCIUM SPEC-SCNC: 9.4 MG/DL (ref 8.4–10.2)
CHLORIDE SERPL-SCNC: 103 MMOL/L (ref 95–110)
CO2 SERPL-SCNC: 28 MMOL/L (ref 22–31)
CREAT BLD-MCNC: 2 MG/DL (ref 0.7–1.3)
GFR SERPL CREATININE-BSD FRML MDRD: 33 ML/MIN/1.73 (ref 60–113)
GLUCOSE BLD-MCNC: 104 MG/DL (ref 60–100)
POTASSIUM BLD-SCNC: 4.7 MMOL/L (ref 3.5–5.1)
SODIUM BLD-SCNC: 142 MMOL/L (ref 137–145)

## 2018-02-02 PROCEDURE — 36415 COLL VENOUS BLD VENIPUNCTURE: CPT | Performed by: FAMILY MEDICINE

## 2018-02-02 PROCEDURE — 84681 ASSAY OF C-PEPTIDE: CPT | Performed by: NURSE PRACTITIONER

## 2018-02-02 PROCEDURE — 80048 BASIC METABOLIC PNL TOTAL CA: CPT | Performed by: NURSE PRACTITIONER

## 2018-02-05 LAB — C PEPTIDE SERPL-MCNC: 11.7 NG/ML (ref 1.1–4.4)

## 2018-02-07 ENCOUNTER — TELEPHONE (OUTPATIENT)
Dept: ENDOCRINOLOGY | Facility: CLINIC | Age: 68
End: 2018-02-07

## 2018-02-07 NOTE — TELEPHONE ENCOUNTER
----- Message from KAHLIL Ovalles sent at 2/7/2018  8:08 AM CST -----  I sent them to [facility] to fax to [employer]  ----- Message -----     From: Gabriela Vargas MA     Sent: 2/6/2018   4:36 PM       To: KAHLIL Ovalles    Wife called looking for lab results that he had drawn Friday.  It is about his c-peptide and fasting sugars for [employer].   [phone]

## 2018-04-23 ENCOUNTER — OFFICE VISIT (OUTPATIENT)
Dept: ENDOCRINOLOGY | Facility: CLINIC | Age: 68
End: 2018-04-23

## 2018-04-23 VITALS
HEART RATE: 67 BPM | HEIGHT: 73 IN | SYSTOLIC BLOOD PRESSURE: 142 MMHG | BODY MASS INDEX: 36.84 KG/M2 | DIASTOLIC BLOOD PRESSURE: 82 MMHG | WEIGHT: 278 LBS

## 2018-04-23 DIAGNOSIS — IMO0002 UNCONTROLLED TYPE 2 DIABETES MELLITUS WITH COMPLICATION, WITH LONG-TERM CURRENT USE OF INSULIN: Primary | ICD-10-CM

## 2018-04-23 DIAGNOSIS — I10 ESSENTIAL HYPERTENSION: ICD-10-CM

## 2018-04-23 DIAGNOSIS — E78.2 MIXED HYPERLIPIDEMIA: ICD-10-CM

## 2018-04-23 PROCEDURE — 99214 OFFICE O/P EST MOD 30 MIN: CPT | Performed by: NURSE PRACTITIONER

## 2018-04-23 RX ORDER — HYDROXYCHLOROQUINE SULFATE 200 MG/1
400 TABLET, FILM COATED ORAL
COMMUNITY
Start: 2018-02-07 | End: 2018-08-07

## 2018-04-23 RX ORDER — LOSARTAN POTASSIUM 25 MG/1
25 TABLET ORAL DAILY
Refills: 3 | COMMUNITY
Start: 2018-03-06

## 2018-04-23 NOTE — PROGRESS NOTES
Ginger Hardwick is a 67 y.o. male. he is here today for follow-up.    History of Present Illness       Duration/Timing:Diabetes mellitus type 2, age at onset of diabetes: 50 years, onset of symptoms gradual      timing constant       Quality not controlled  But improved       Severity high     Recent hospital admission since last visit      #1 cellulitis left leg  #2 edema  #3 diarrhea      Off pump during each visit      Currently on antibiotics             Severity (Complications/Hospitalizations)  Secondary Macrovascular Complications: No CAD, No CVA, No PAD  Secondary Microvascular Complications:Diabetic nephropathy, proteinuria, diabetic neuropathy, no diabetic retinopathy     Context  Diabetes Regimen:Insulin, last HgbA1c 7.7% Jan. 2018   Blood Glucose Readings       Now on medtronic insulin pump       Downloaded and average bg - 129     No low blood sugars      Exercise:Does not execise     Associated Signs/Symptoms  Hyperglycemic Symptoms:polyuria, polydipsia, polyphagia, weight gain  Hypoglycemic Episodes:NO documented hypoglycemia since last visit; has  hypoglycemia unawareness     Aggravating , prednisone for SLE             The following portions of the patient's history were reviewed and updated as appropriate:   Past Medical History:   Diagnosis Date   • Diabetic neuropathy    • Elevated blood pressure    • Hypercholesterolemia    • Lupus erythematosus    • Tobacco user    • Type 2 diabetes mellitus with hyperglycemia    • Type 2 diabetes mellitus, uncontrolled      Past Surgical History:   Procedure Laterality Date   • CARDIAC CATHETERIZATION  03/26/2014    No epicardial coronary artery disease. Normal LV systolic function. EF 55%. No wall motion abnormalities. Systemic hypertension.     Family History   Problem Relation Age of Onset   • Diabetes Other        Current Outpatient Prescriptions   Medication Sig Dispense Refill   • hydroxychloroquine (PLAQUENIL) 200 MG tablet Take 400 mg by  mouth.     • amiodarone (PACERONE) 200 MG tablet Take 200 mg by mouth 2 (Two) Times a Day.     • aspirin 81 MG chewable tablet Chew 81 mg daily.     • atorvastatin (LIPITOR) 80 MG tablet      • budesonide-formoterol (SYMBICORT) 160-4.5 MCG/ACT inhaler Inhale 2 puffs 2 (two) times a day.     • bumetanide (BUMEX) 2 MG tablet Take 2 mg by mouth 2 (Two) Times a Day.     • carvedilol (COREG) 25 MG tablet Take 25 mg by mouth 2 (two) times a day with meals.     • cetirizine (ZyrTEC) 10 MG tablet Take 10 mg by mouth daily.     • clindamycin (CLEOCIN) 300 MG capsule Take 300 mg by mouth 2 (Two) Times a Day.     • cloNIDine (CATAPRES) 0.2 MG tablet Take 0.2 mg by mouth 2 (two) times a day.     • DULoxetine (CYMBALTA) 30 MG capsule      • esomeprazole (nexIUM) 20 MG capsule Take 20 mg by mouth Daily.     • fenofibrate 160 MG tablet      • furosemide (LASIX) 40 MG tablet Take 40 mg by mouth daily.     • HUMALOG 100 UNIT/ML injection 150 UNITS THROUGH INSULIN PUMP PER DAY 50 mL 0   • HYDROcodone-acetaminophen (NORCO) 7.5-325 MG per tablet      • insulin detemir (LEVEMIR) 100 UNIT/ML injection 50 units daily 2 each 3   • losartan (COZAAR) 25 MG tablet Take 25 mg by mouth Daily.  3   • metolazone (ZAROXOLYN) 2.5 MG tablet Take 2.5 mg by mouth.     • metOLazone (ZAROXOLYN) 5 MG tablet Take 5 mg by mouth As Needed.     • metoprolol tartrate (LOPRESSOR) 25 MG tablet      • Multiple Vitamins-Minerals (DAILY MULTIVITAMIN PO) Take 1 capsule by mouth daily.     • mycophenolate (CELLCEPT) 500 MG tablet Take  by mouth 3 (three) times a day.     • Omega-3 Fatty Acids (FISH OIL) 1000 MG capsule capsule Take 1,000 mg by mouth 2 (two) times a day.     • potassium chloride (K-DUR) 10 MEQ CR tablet      • predniSONE (DELTASONE) 10 MG tablet Take 10 mg by mouth Daily.     • PROAIR  (90 BASE) MCG/ACT inhaler      • SYMBICORT 80-4.5 MCG/ACT inhaler      • tamsulosin (FLOMAX) 0.4 MG capsule 24 hr capsule Take 1 capsule by mouth every night.    "  • TRUEPLUS INSULIN SYRINGE 31G X 5/16\" 1 ML misc      • vitamin D (ERGOCALCIFEROL) 25927 units capsule capsule TAKE ONE CAPSULE BY MOUTH EVERY WEEK -SWALLOW WHOLE. DO NOT CHEW ORCRUSH.  12   • XARELTO 15 MG tablet Take 15 mg by mouth Daily.     • ZETIA 10 MG tablet      • zolpidem (AMBIEN) 10 MG tablet        No current facility-administered medications for this visit.      No Known Allergies  Social History     Social History   • Marital status:      Social History Main Topics   • Smoking status: Current Every Day Smoker   • Smokeless tobacco: Never Used      Comment: encouraged smoking cessation    • Alcohol use No   • Drug use: Unknown     Other Topics Concern   • Not on file       Review of Systems  Review of Systems   Constitutional: Negative for activity change, appetite change, diaphoresis and fatigue.   HENT: Negative for facial swelling, sneezing, sore throat, tinnitus, trouble swallowing and voice change.    Eyes: Negative for photophobia, pain, discharge, redness, itching and visual disturbance.   Respiratory: Negative for apnea, cough, choking, chest tightness and shortness of breath.    Cardiovascular: Negative for chest pain, palpitations and leg swelling.   Gastrointestinal: Negative for abdominal distention, abdominal pain, constipation, diarrhea, nausea and vomiting.   Endocrine: Negative for cold intolerance, heat intolerance, polydipsia, polyphagia and polyuria.   Genitourinary: Negative for difficulty urinating, dysuria, frequency, hematuria and urgency.   Musculoskeletal: Negative for arthralgias, back pain, gait problem, joint swelling, myalgias, neck pain and neck stiffness.   Skin: Negative for color change, pallor, rash and wound.   Neurological: Negative for dizziness, tremors, weakness, light-headedness, numbness and headaches.   Hematological: Negative for adenopathy. Does not bruise/bleed easily.   Psychiatric/Behavioral: Negative for behavioral problems, confusion and sleep " "disturbance.        Objective    /82 (BP Location: Left arm, Patient Position: Sitting, Cuff Size: Adult)   Pulse 67   Ht 185.4 cm (73\")   Wt 126 kg (278 lb)   BMI 36.68 kg/m²   Physical Exam   Constitutional: He is oriented to person, place, and time. He appears well-developed and well-nourished. No distress.   HENT:   Head: Normocephalic and atraumatic.   Right Ear: External ear normal.   Left Ear: External ear normal.   Nose: Nose normal.   Eyes: Conjunctivae and EOM are normal. Pupils are equal, round, and reactive to light.   Neck: Normal range of motion. Neck supple. No tracheal deviation present. No thyromegaly present.   Cardiovascular: Normal rate, regular rhythm and normal heart sounds.    No murmur heard.  Pulmonary/Chest: Effort normal and breath sounds normal. No respiratory distress. He has no wheezes.   Abdominal: Soft. Bowel sounds are normal. There is no tenderness. There is no rebound and no guarding.   Musculoskeletal: Normal range of motion. He exhibits no edema, tenderness or deformity.   Neurological: He is alert and oriented to person, place, and time. No cranial nerve deficit.   Skin: Skin is warm and dry. No rash noted.   Psychiatric: He has a normal mood and affect. His behavior is normal. Judgment and thought content normal.       Lab Review  Glucose   Date Value   02/02/2018 104 mg/dL (H)   07/11/2016 148 mg/dl (H)   03/26/2014 101 mg/dl (H)   03/25/2014 135 mg/dl (H)     Sodium (mmol/L)   Date Value   02/02/2018 142   07/11/2016 139   03/26/2014 138   03/25/2014 138     Potassium (mmol/L)   Date Value   02/02/2018 4.7   07/11/2016 3.8   03/26/2014 3.9   03/25/2014 3.6     Chloride (mmol/L)   Date Value   02/02/2018 103   07/11/2016 97   03/26/2014 101   03/25/2014 97     CO2 (mmol/L)   Date Value   02/02/2018 28.0   07/11/2016 35 (H)   03/26/2014 31   03/25/2014 32 (H)     BUN   Date Value   02/02/2018 53 mg/dL (H)   07/11/2016 43 mg/dl (H)   03/26/2014 22 mg/dl (H) "   03/25/2014 25 mg/dl (H)     Creatinine   Date Value   02/02/2018 2.00 mg/dL (H)   07/11/2016 1.5 mg/dl (H)   03/26/2014 1.4 mg/dl (H)   03/25/2014 1.5 mg/dl (H)     Hemoglobin A1C (%TotHgb)   Date Value   07/11/2016 7.4 (H)     Triglycerides (mg/dl)   Date Value   07/11/2016 357 (H)   03/26/2014 371 (H)     LDL Cholesterol  (mg/dl)   Date Value   07/11/2016 62   03/26/2014 41       Assessment/Plan      1. Uncontrolled type 2 diabetes mellitus with complication, with long-term current use of insulin    2. Mixed hyperlipidemia    3. Essential hypertension    .    Medications prescribed:  Outpatient Encounter Prescriptions as of 4/23/2018   Medication Sig Dispense Refill   • hydroxychloroquine (PLAQUENIL) 200 MG tablet Take 400 mg by mouth.     • amiodarone (PACERONE) 200 MG tablet Take 200 mg by mouth 2 (Two) Times a Day.     • aspirin 81 MG chewable tablet Chew 81 mg daily.     • atorvastatin (LIPITOR) 80 MG tablet      • budesonide-formoterol (SYMBICORT) 160-4.5 MCG/ACT inhaler Inhale 2 puffs 2 (two) times a day.     • bumetanide (BUMEX) 2 MG tablet Take 2 mg by mouth 2 (Two) Times a Day.     • carvedilol (COREG) 25 MG tablet Take 25 mg by mouth 2 (two) times a day with meals.     • cetirizine (ZyrTEC) 10 MG tablet Take 10 mg by mouth daily.     • clindamycin (CLEOCIN) 300 MG capsule Take 300 mg by mouth 2 (Two) Times a Day.     • cloNIDine (CATAPRES) 0.2 MG tablet Take 0.2 mg by mouth 2 (two) times a day.     • DULoxetine (CYMBALTA) 30 MG capsule      • esomeprazole (nexIUM) 20 MG capsule Take 20 mg by mouth Daily.     • fenofibrate 160 MG tablet      • furosemide (LASIX) 40 MG tablet Take 40 mg by mouth daily.     • HUMALOG 100 UNIT/ML injection 150 UNITS THROUGH INSULIN PUMP PER DAY 50 mL 0   • HYDROcodone-acetaminophen (NORCO) 7.5-325 MG per tablet      • insulin detemir (LEVEMIR) 100 UNIT/ML injection 50 units daily 2 each 3   • losartan (COZAAR) 25 MG tablet Take 25 mg by mouth Daily.  3   • metolazone  "(ZAROXOLYN) 2.5 MG tablet Take 2.5 mg by mouth.     • metOLazone (ZAROXOLYN) 5 MG tablet Take 5 mg by mouth As Needed.     • metoprolol tartrate (LOPRESSOR) 25 MG tablet      • Multiple Vitamins-Minerals (DAILY MULTIVITAMIN PO) Take 1 capsule by mouth daily.     • mycophenolate (CELLCEPT) 500 MG tablet Take  by mouth 3 (three) times a day.     • Omega-3 Fatty Acids (FISH OIL) 1000 MG capsule capsule Take 1,000 mg by mouth 2 (two) times a day.     • potassium chloride (K-DUR) 10 MEQ CR tablet      • predniSONE (DELTASONE) 10 MG tablet Take 10 mg by mouth Daily.     • PROAIR  (90 BASE) MCG/ACT inhaler      • SYMBICORT 80-4.5 MCG/ACT inhaler      • tamsulosin (FLOMAX) 0.4 MG capsule 24 hr capsule Take 1 capsule by mouth every night.     • TRUEPLUS INSULIN SYRINGE 31G X 5/16\" 1 ML misc      • vitamin D (ERGOCALCIFEROL) 42631 units capsule capsule TAKE ONE CAPSULE BY MOUTH EVERY WEEK -SWALLOW WHOLE. DO NOT CHEW ORCRUSH.  12   • XARELTO 15 MG tablet Take 15 mg by mouth Daily.     • ZETIA 10 MG tablet      • zolpidem (AMBIEN) 10 MG tablet      • [DISCONTINUED] losartan (COZAAR) 100 MG tablet TAKE 1 TABLET BY MOUTH EVERY DAY  11     No facility-administered encounter medications on file as of 4/23/2018.        Orders placed during this encounter include:  No orders of the defined types were placed in this encounter.  Glycemic Management        Now on Medtronic insulin pump         BASAL -     MN - 2.1  8 am - 2.0            Carb ratio      4.5        Sensitivity         20        Target BG       120 to 140        Previous regimen          Levemir 40 units and 45 units pm          Humalog  1 unit per every 5 grams of CHO      Lipid Management        No results found for: CHOL            Lab Results   Component Value Date     TRIG 357 (H) 07/11/2016     TRIG 371 (H) 03/26/2014                Lab Results   Component Value Date     HDL 37 (L) 07/11/2016     HDL 26 (L) 03/26/2014                Lab Results   Component " Value Date     LDLCALC 62 07/11/2016     LDLCALC 41 03/26/2014            Simvastatin  80 mg qhs    triglice  160 mg daily   zetia 10 mg daily     Blood Pressure Management:target blood pressure                  on clonidine , lasix and coreg      follows with nephrology       On metolazone 2.5 mg three times weekly      On lasix 40 mg daily      Wheezing, not smoking     Take lasix 40 bid for 3 days      Microvascular Complication Monitoring: Microalbuminuria, No Diabetic Retinopathy, Diabetic Neuropathy       CKD stage III, nephrotic sx      Lyrica -- caused side effects      Gabapentin - caused side effects      Immunizations: Last pneumococcal immunization 2016  Preventive Care:patient is not smoking   Weight Related:Obesity , counseled nutrition, and physical activity   Other Diabetes Related Aspects                   Lab Results   Component Value Date     TSH 1.76 07/11/2016                Lab Results   Component Value Date     [id number] 269 07/11/2016      Diagnosed with Lupus      Taking prednisone           4. Follow-up: Return in about 3 months (around 7/23/2018) for Recheck.

## 2018-08-29 ENCOUNTER — OFFICE VISIT (OUTPATIENT)
Dept: ENDOCRINOLOGY | Facility: CLINIC | Age: 68
End: 2018-08-29

## 2018-08-29 ENCOUNTER — APPOINTMENT (OUTPATIENT)
Dept: LAB | Facility: HOSPITAL | Age: 68
End: 2018-08-29

## 2018-08-29 VITALS
SYSTOLIC BLOOD PRESSURE: 130 MMHG | WEIGHT: 276 LBS | BODY MASS INDEX: 36.58 KG/M2 | HEART RATE: 75 BPM | DIASTOLIC BLOOD PRESSURE: 80 MMHG | HEIGHT: 73 IN | OXYGEN SATURATION: 93 %

## 2018-08-29 DIAGNOSIS — IMO0002 UNCONTROLLED TYPE 2 DIABETES MELLITUS WITH COMPLICATION, WITH LONG-TERM CURRENT USE OF INSULIN: Primary | ICD-10-CM

## 2018-08-29 DIAGNOSIS — E55.9 VITAMIN D DEFICIENCY: ICD-10-CM

## 2018-08-29 DIAGNOSIS — E78.2 MIXED HYPERLIPIDEMIA: ICD-10-CM

## 2018-08-29 DIAGNOSIS — I10 ESSENTIAL HYPERTENSION: ICD-10-CM

## 2018-08-29 LAB
25(OH)D3 SERPL-MCNC: 20.1 NG/ML (ref 30–100)
ALBUMIN SERPL-MCNC: 3.7 G/DL (ref 3.4–4.8)
ALBUMIN/GLOB SERPL: 1.2 G/DL (ref 1.1–1.8)
ALP SERPL-CCNC: 72 U/L (ref 38–126)
ALT SERPL W P-5'-P-CCNC: 37 U/L (ref 21–72)
ANION GAP SERPL CALCULATED.3IONS-SCNC: 8 MMOL/L (ref 5–15)
AST SERPL-CCNC: 30 U/L (ref 17–59)
BASOPHILS # BLD AUTO: 0.02 10*3/MM3 (ref 0–0.2)
BASOPHILS NFR BLD AUTO: 0.2 % (ref 0–2)
BILIRUB SERPL-MCNC: 0.3 MG/DL (ref 0.2–1.3)
BUN BLD-MCNC: 31 MG/DL (ref 7–21)
BUN/CREAT SERPL: 17.7 (ref 7–25)
CALCIUM SPEC-SCNC: 9.2 MG/DL (ref 8.4–10.2)
CHLORIDE SERPL-SCNC: 101 MMOL/L (ref 95–110)
CO2 SERPL-SCNC: 31 MMOL/L (ref 22–31)
CREAT BLD-MCNC: 1.75 MG/DL (ref 0.7–1.3)
DEPRECATED RDW RBC AUTO: 48.5 FL (ref 35.1–43.9)
EOSINOPHIL # BLD AUTO: 0.11 10*3/MM3 (ref 0–0.7)
EOSINOPHIL NFR BLD AUTO: 1 % (ref 0–7)
ERYTHROCYTE [DISTWIDTH] IN BLOOD BY AUTOMATED COUNT: 13.6 % (ref 11.5–14.5)
GFR SERPL CREATININE-BSD FRML MDRD: 39 ML/MIN/1.73 (ref 49–113)
GLOBULIN UR ELPH-MCNC: 3.1 GM/DL (ref 2.3–3.5)
GLUCOSE BLD-MCNC: 93 MG/DL (ref 60–100)
HBA1C MFR BLD: 6.1 % (ref 4–5.6)
HBA1C MFR BLD: 7.7 %
HCT VFR BLD AUTO: 43.2 % (ref 39–49)
HGB BLD-MCNC: 14.6 G/DL (ref 13.7–17.3)
IMM GRANULOCYTES # BLD: 0.12 10*3/MM3 (ref 0–0.02)
IMM GRANULOCYTES NFR BLD: 1.1 % (ref 0–0.5)
LYMPHOCYTES # BLD AUTO: 2.75 10*3/MM3 (ref 0.6–4.2)
LYMPHOCYTES NFR BLD AUTO: 24.7 % (ref 10–50)
MCH RBC QN AUTO: 32.9 PG (ref 26.5–34)
MCHC RBC AUTO-ENTMCNC: 33.8 G/DL (ref 31.5–36.3)
MCV RBC AUTO: 97.3 FL (ref 80–98)
MONOCYTES # BLD AUTO: 0.92 10*3/MM3 (ref 0–0.9)
MONOCYTES NFR BLD AUTO: 8.3 % (ref 0–12)
NEUTROPHILS # BLD AUTO: 7.2 10*3/MM3 (ref 2–8.6)
NEUTROPHILS NFR BLD AUTO: 64.7 % (ref 37–80)
NRBC BLD MANUAL-RTO: 0 /100 WBC (ref 0–0)
PLATELET # BLD AUTO: 188 10*3/MM3 (ref 150–450)
PMV BLD AUTO: 9.4 FL (ref 8–12)
POTASSIUM BLD-SCNC: 4.4 MMOL/L (ref 3.5–5.1)
PROT SERPL-MCNC: 6.8 G/DL (ref 6.3–8.6)
RBC # BLD AUTO: 4.44 10*6/MM3 (ref 4.37–5.74)
SODIUM BLD-SCNC: 140 MMOL/L (ref 137–145)
TSH SERPL DL<=0.05 MIU/L-ACNC: 2.3 MIU/ML (ref 0.46–4.68)
WBC NRBC COR # BLD: 11.12 10*3/MM3 (ref 3.2–9.8)

## 2018-08-29 PROCEDURE — 83036 HEMOGLOBIN GLYCOSYLATED A1C: CPT | Performed by: NURSE PRACTITIONER

## 2018-08-29 PROCEDURE — 82306 VITAMIN D 25 HYDROXY: CPT | Performed by: NURSE PRACTITIONER

## 2018-08-29 PROCEDURE — 99214 OFFICE O/P EST MOD 30 MIN: CPT | Performed by: NURSE PRACTITIONER

## 2018-08-29 PROCEDURE — 36415 COLL VENOUS BLD VENIPUNCTURE: CPT | Performed by: NURSE PRACTITIONER

## 2018-08-29 PROCEDURE — 85025 COMPLETE CBC W/AUTO DIFF WBC: CPT | Performed by: NURSE PRACTITIONER

## 2018-08-29 PROCEDURE — 84443 ASSAY THYROID STIM HORMONE: CPT | Performed by: NURSE PRACTITIONER

## 2018-08-29 PROCEDURE — 80053 COMPREHEN METABOLIC PANEL: CPT | Performed by: NURSE PRACTITIONER

## 2018-08-29 NOTE — PROGRESS NOTES
Ginger Hardwick is a 67 y.o. male. he is here today for follow-up.    History of Present Illness       Duration/Timing:Diabetes mellitus type 2, age at onset of diabetes: 50 years, onset of symptoms gradual      timing constant       Quality not controlled  But improved       Severity high                 Severity (Complications/Hospitalizations)  Secondary Macrovascular Complications: No CAD, No CVA, No PAD  Secondary Microvascular Complications:Diabetic nephropathy, proteinuria, diabetic neuropathy, no diabetic retinopathy     Context  Diabetes Regimen:Insulin,     Lab Results   Component Value Date    HGBA1C 7.7 01/03/2018       Blood Glucose Readings       Now on medtronic insulin pump       Downloaded and average bg - 118       No lows        Exercise:Does not execise     Associated Signs/Symptoms  Hyperglycemic Symptoms:polyuria, polydipsia, polyphagia, weight gain  Hypoglycemic Episodes:NO documented hypoglycemia since last visit; has  hypoglycemia unawareness     Aggravating , prednisone for SLE            The following portions of the patient's history were reviewed and updated as appropriate:   Past Medical History:   Diagnosis Date   • Diabetic neuropathy (CMS/[facility])    • Elevated blood pressure    • Hypercholesterolemia    • Lupus erythematosus    • Tobacco user    • Type 2 diabetes mellitus with hyperglycemia (CMS/[facility])    • Type 2 diabetes mellitus, uncontrolled (CMS/[facility])      Past Surgical History:   Procedure Laterality Date   • CARDIAC CATHETERIZATION  03/26/2014    No epicardial coronary artery disease. Normal LV systolic function. EF 55%. No wall motion abnormalities. Systemic hypertension.     Family History   Problem Relation Age of Onset   • Diabetes Other        Current Outpatient Prescriptions   Medication Sig Dispense Refill   • amiodarone (PACERONE) 200 MG tablet Take 200 mg by mouth 2 (Two) Times a Day.     • aspirin 81 MG chewable tablet Chew 81 mg daily.     • atorvastatin  "(LIPITOR) 80 MG tablet      • budesonide-formoterol (SYMBICORT) 160-4.5 MCG/ACT inhaler Inhale 2 puffs 2 (two) times a day.     • bumetanide (BUMEX) 2 MG tablet Take 2 mg by mouth 2 (Two) Times a Day.     • carvedilol (COREG) 25 MG tablet Take 25 mg by mouth 2 (two) times a day with meals.     • cetirizine (ZyrTEC) 10 MG tablet Take 10 mg by mouth daily.     • clindamycin (CLEOCIN) 300 MG capsule Take 300 mg by mouth 2 (Two) Times a Day.     • cloNIDine (CATAPRES) 0.2 MG tablet Take 0.2 mg by mouth 2 (two) times a day.     • DULoxetine (CYMBALTA) 30 MG capsule      • esomeprazole (nexIUM) 20 MG capsule Take 20 mg by mouth Daily.     • fenofibrate 160 MG tablet      • furosemide (LASIX) 40 MG tablet Take 40 mg by mouth daily.     • HUMALOG 100 UNIT/ML injection 150 UNITS THROUGH INSULIN PUMP PER DAY 50 mL 0   • HYDROcodone-acetaminophen (NORCO) 7.5-325 MG per tablet      • insulin detemir (LEVEMIR) 100 UNIT/ML injection 50 units daily 2 each 3   • losartan (COZAAR) 25 MG tablet Take 25 mg by mouth Daily.  3   • metolazone (ZAROXOLYN) 2.5 MG tablet Take 2.5 mg by mouth.     • metOLazone (ZAROXOLYN) 5 MG tablet Take 5 mg by mouth As Needed.     • metoprolol tartrate (LOPRESSOR) 25 MG tablet      • Multiple Vitamins-Minerals (DAILY MULTIVITAMIN PO) Take 1 capsule by mouth daily.     • mycophenolate (CELLCEPT) 500 MG tablet Take  by mouth 3 (three) times a day.     • Omega-3 Fatty Acids (FISH OIL) 1000 MG capsule capsule Take 1,000 mg by mouth 2 (two) times a day.     • potassium chloride (K-DUR) 10 MEQ CR tablet      • predniSONE (DELTASONE) 10 MG tablet Take 10 mg by mouth Daily.     • PROAIR  (90 BASE) MCG/ACT inhaler      • SYMBICORT 80-4.5 MCG/ACT inhaler      • tamsulosin (FLOMAX) 0.4 MG capsule 24 hr capsule Take 1 capsule by mouth every night.     • TRUEPLUS INSULIN SYRINGE 31G X 5/16\" 1 ML misc      • vitamin D (ERGOCALCIFEROL) 68519 units capsule capsule TAKE ONE CAPSULE BY MOUTH EVERY WEEK -SWALLOW " WHOLE. DO NOT CHEW ORCRUSH.  12   • XARELTO 15 MG tablet Take 15 mg by mouth Daily.     • ZETIA 10 MG tablet      • zolpidem (AMBIEN) 10 MG tablet        No current facility-administered medications for this visit.      No Known Allergies  Social History     Social History   • Marital status:      Social History Main Topics   • Smoking status: Current Every Day Smoker   • Smokeless tobacco: Never Used      Comment: encouraged smoking cessation    • Alcohol use No   • Drug use: Unknown     Other Topics Concern   • Not on file       Review of Systems  Review of Systems   Constitutional: Negative for activity change, appetite change, diaphoresis and fatigue.   HENT: Negative for facial swelling, sneezing, sore throat, tinnitus, trouble swallowing and voice change.    Eyes: Negative for photophobia, pain, discharge, redness, itching and visual disturbance.   Respiratory: Negative for apnea, cough, choking, chest tightness and shortness of breath.    Cardiovascular: Negative for chest pain, palpitations and leg swelling.   Gastrointestinal: Negative for abdominal distention, abdominal pain, constipation, diarrhea, nausea and vomiting.   Endocrine: Negative for cold intolerance, heat intolerance, polydipsia, polyphagia and polyuria.   Genitourinary: Negative for difficulty urinating, dysuria, frequency, hematuria and urgency.   Musculoskeletal: Positive for arthralgias, gait problem and joint swelling. Negative for back pain, myalgias, neck pain and neck stiffness.   Skin: Negative for color change, pallor, rash and wound.   Neurological: Negative for dizziness, tremors, weakness, light-headedness, numbness and headaches.   Hematological: Negative for adenopathy. Does not bruise/bleed easily.   Psychiatric/Behavioral: Negative for behavioral problems, confusion and sleep disturbance.        Objective    /80 (BP Location: Left arm, Patient Position: Sitting, Cuff Size: Large Adult)   Pulse 75   Ht 185.4 cm  "(73\")   Wt 125 kg (276 lb)   SpO2 93%   BMI 36.41 kg/m²   Physical Exam   Constitutional: He is oriented to person, place, and time. He appears well-developed and well-nourished. No distress.   HENT:   Head: Normocephalic and atraumatic.   Right Ear: External ear normal.   Left Ear: External ear normal.   Nose: Nose normal.   Eyes: Pupils are equal, round, and reactive to light. Conjunctivae and EOM are normal.   Neck: Normal range of motion. Neck supple. No tracheal deviation present. No thyromegaly present.   Cardiovascular: Normal rate, regular rhythm and normal heart sounds.    No murmur heard.  Pulmonary/Chest: Effort normal and breath sounds normal. No respiratory distress. He has no wheezes.   Abdominal: Soft. Bowel sounds are normal. There is no tenderness. There is no rebound and no guarding.   Musculoskeletal: Normal range of motion. He exhibits no edema, tenderness or deformity.   Neurological: He is alert and oriented to person, place, and time. No cranial nerve deficit.   Skin: Skin is warm and dry. No rash noted.   Psychiatric: He has a normal mood and affect. His behavior is normal. Judgment and thought content normal.       Lab Review  Glucose   Date Value   02/02/2018 104 mg/dL (H)   07/11/2016 148 mg/dl (H)   03/26/2014 101 mg/dl (H)   03/25/2014 135 mg/dl (H)     Sodium (mmol/L)   Date Value   02/02/2018 142   07/11/2016 139   03/26/2014 138   03/25/2014 138     Potassium (mmol/L)   Date Value   02/02/2018 4.7   07/11/2016 3.8   03/26/2014 3.9   03/25/2014 3.6     Chloride (mmol/L)   Date Value   02/02/2018 103   07/11/2016 97   03/26/2014 101   03/25/2014 97     CO2 (mmol/L)   Date Value   02/02/2018 28.0   07/11/2016 35 (H)   03/26/2014 31   03/25/2014 32 (H)     BUN   Date Value   02/02/2018 53 mg/dL (H)   07/11/2016 43 mg/dl (H)   03/26/2014 22 mg/dl (H)   03/25/2014 25 mg/dl (H)     Creatinine   Date Value   02/02/2018 2.00 mg/dL (H)   07/11/2016 1.5 mg/dl (H)   03/26/2014 1.4 mg/dl (H) "   03/25/2014 1.5 mg/dl (H)     Hemoglobin A1C   Date Value   01/03/2018 7.7   07/11/2016 7.4 %TotHgb (H)     Triglycerides (mg/dl)   Date Value   07/11/2016 357 (H)   03/26/2014 371 (H)     LDL Cholesterol  (mg/dl)   Date Value   07/11/2016 62   03/26/2014 41       Assessment/Plan      1. Uncontrolled type 2 diabetes mellitus with complication, with long-term current use of insulin (CMS/[facility])    2. Essential hypertension    3. Mixed hyperlipidemia    4. Vitamin D deficiency    .    Medications prescribed:  Outpatient Encounter Prescriptions as of 8/29/2018   Medication Sig Dispense Refill   • amiodarone (PACERONE) 200 MG tablet Take 200 mg by mouth 2 (Two) Times a Day.     • aspirin 81 MG chewable tablet Chew 81 mg daily.     • atorvastatin (LIPITOR) 80 MG tablet      • budesonide-formoterol (SYMBICORT) 160-4.5 MCG/ACT inhaler Inhale 2 puffs 2 (two) times a day.     • bumetanide (BUMEX) 2 MG tablet Take 2 mg by mouth 2 (Two) Times a Day.     • carvedilol (COREG) 25 MG tablet Take 25 mg by mouth 2 (two) times a day with meals.     • cetirizine (ZyrTEC) 10 MG tablet Take 10 mg by mouth daily.     • clindamycin (CLEOCIN) 300 MG capsule Take 300 mg by mouth 2 (Two) Times a Day.     • cloNIDine (CATAPRES) 0.2 MG tablet Take 0.2 mg by mouth 2 (two) times a day.     • DULoxetine (CYMBALTA) 30 MG capsule      • esomeprazole (nexIUM) 20 MG capsule Take 20 mg by mouth Daily.     • fenofibrate 160 MG tablet      • furosemide (LASIX) 40 MG tablet Take 40 mg by mouth daily.     • HUMALOG 100 UNIT/ML injection 150 UNITS THROUGH INSULIN PUMP PER DAY 50 mL 0   • HYDROcodone-acetaminophen (NORCO) 7.5-325 MG per tablet      • insulin detemir (LEVEMIR) 100 UNIT/ML injection 50 units daily 2 each 3   • losartan (COZAAR) 25 MG tablet Take 25 mg by mouth Daily.  3   • metolazone (ZAROXOLYN) 2.5 MG tablet Take 2.5 mg by mouth.     • metOLazone (ZAROXOLYN) 5 MG tablet Take 5 mg by mouth As Needed.     • metoprolol tartrate (LOPRESSOR) 25 MG  "tablet      • Multiple Vitamins-Minerals (DAILY MULTIVITAMIN PO) Take 1 capsule by mouth daily.     • mycophenolate (CELLCEPT) 500 MG tablet Take  by mouth 3 (three) times a day.     • Omega-3 Fatty Acids (FISH OIL) 1000 MG capsule capsule Take 1,000 mg by mouth 2 (two) times a day.     • potassium chloride (K-DUR) 10 MEQ CR tablet      • predniSONE (DELTASONE) 10 MG tablet Take 10 mg by mouth Daily.     • PROAIR  (90 BASE) MCG/ACT inhaler      • SYMBICORT 80-4.5 MCG/ACT inhaler      • tamsulosin (FLOMAX) 0.4 MG capsule 24 hr capsule Take 1 capsule by mouth every night.     • TRUEPLUS INSULIN SYRINGE 31G X 5/16\" 1 ML misc      • vitamin D (ERGOCALCIFEROL) 31473 units capsule capsule TAKE ONE CAPSULE BY MOUTH EVERY WEEK -SWALLOW WHOLE. DO NOT CHEW ORCRUSH.  12   • XARELTO 15 MG tablet Take 15 mg by mouth Daily.     • ZETIA 10 MG tablet      • zolpidem (AMBIEN) 10 MG tablet        No facility-administered encounter medications on file as of 8/29/2018.        Orders placed during this encounter include:  Orders Placed This Encounter   Procedures   • Hemoglobin A1c     This order was created through External Result Entry   • Comprehensive Metabolic Panel   • Hemoglobin A1c   • Vitamin D 25 Hydroxy   • TSH   • CBC & Differential     Order Specific Question:   Manual Differential     Answer:   No     Glycemic Management      Lab Results   Component Value Date    HGBA1C 7.7 01/03/2018        Now on Medtronic insulin pump         BASAL -     MN - 2.1  8 am - 2.0            Carb ratio      4.5        Sensitivity         20        Target BG       120 to 140        Previous regimen          Levemir 40 units and 45 units pm          Humalog  1 unit per every 5 grams of CHO      Lipid Management                    Lab Results   Component Value Date     TRIG 357 (H) 07/11/2016     TRIG 371 (H) 03/26/2014                Lab Results   Component Value Date     HDL 37 (L) 07/11/2016     HDL 26 (L) 03/26/2014                Lab " Results   Component Value Date     LDLCALC 62 07/11/2016     LDLCALC 41 03/26/2014            Simvastatin  80 mg qhs    triglice  160 mg daily   zetia 10 mg daily     Blood Pressure Management:target blood pressure         on clonidine , lasix and coreg      follows with nephrology       On metolazone 2.5 mg three times weekly      On lasix 40 mg daily      Wheezing, not smoking     Take lasix 40 bid for 3 days      Microvascular Complication Monitoring: Microalbuminuria, No Diabetic Retinopathy, Diabetic Neuropathy       CKD stage III, nephrotic sx      Lyrica -- caused side effects      Gabapentin - caused side effects     No norco for pain -- prescribed by primary      Immunizations: Last pneumococcal immunization 2016    Preventive Care:patient is not smoking     Weight Related:Obesity , counseled nutrition, and physical activity       Patient's Body mass index is 36.41 kg/m². BMI is above normal parameters. Recommendations include: educational material.    Cannot exercise     Other Diabetes Related Aspects                   Lab Results   Component Value Date     TSH 1.76 07/11/2016                Lab Results   Component Value Date     [id number] 269 07/11/2016      Diagnosed with Lupus      Taking prednisone 5 mg daily                 4. Follow-up: Return in about 3 months (around 11/29/2018) for Recheck.

## 2018-08-31 ENCOUNTER — TELEPHONE (OUTPATIENT)
Dept: ENDOCRINOLOGY | Facility: CLINIC | Age: 68
End: 2018-08-31

## 2018-08-31 RX ORDER — ERGOCALCIFEROL 1.25 MG/1
50000 CAPSULE ORAL
Qty: 4 CAPSULE | Refills: 5 | Status: SHIPPED | OUTPATIENT
Start: 2018-08-31 | End: 2019-11-27 | Stop reason: SDUPTHER

## 2019-05-13 RX ORDER — PERPHENAZINE 16 MG/1
TABLET, FILM COATED ORAL
Qty: 150 EACH | Refills: 11 | Status: SHIPPED | OUTPATIENT
Start: 2019-05-13 | End: 2019-05-30 | Stop reason: SDUPTHER

## 2019-05-31 ENCOUNTER — TELEPHONE (OUTPATIENT)
Dept: FAMILY MEDICINE CLINIC | Facility: CLINIC | Age: 69
End: 2019-05-31

## 2019-06-05 RX ORDER — PERPHENAZINE 16 MG/1
TABLET, FILM COATED ORAL
Qty: 120 EACH | Refills: 6 | Status: SHIPPED | OUTPATIENT
Start: 2019-06-05 | End: 2020-06-10

## 2019-07-24 ENCOUNTER — OFFICE VISIT (OUTPATIENT)
Dept: ENDOCRINOLOGY | Facility: CLINIC | Age: 69
End: 2019-07-24

## 2019-07-24 VITALS
WEIGHT: 278.7 LBS | SYSTOLIC BLOOD PRESSURE: 136 MMHG | BODY MASS INDEX: 36.94 KG/M2 | OXYGEN SATURATION: 98 % | HEART RATE: 78 BPM | DIASTOLIC BLOOD PRESSURE: 80 MMHG | HEIGHT: 73 IN

## 2019-07-24 DIAGNOSIS — IMO0002 DIABETES MELLITUS TYPE 2, UNCONTROLLED, WITH COMPLICATIONS: Primary | ICD-10-CM

## 2019-07-24 DIAGNOSIS — E66.9 CLASS 2 OBESITY WITH BODY MASS INDEX (BMI) OF 35.0 TO 35.9 IN ADULT, UNSPECIFIED OBESITY TYPE, UNSPECIFIED WHETHER SERIOUS COMORBIDITY PRESENT: ICD-10-CM

## 2019-07-24 DIAGNOSIS — I10 ESSENTIAL HYPERTENSION: ICD-10-CM

## 2019-07-24 DIAGNOSIS — E78.2 MIXED HYPERLIPIDEMIA: ICD-10-CM

## 2019-07-24 LAB — HBA1C MFR BLD: 6 %

## 2019-07-24 PROCEDURE — 99214 OFFICE O/P EST MOD 30 MIN: CPT | Performed by: NURSE PRACTITIONER

## 2019-07-24 PROCEDURE — 83036 HEMOGLOBIN GLYCOSYLATED A1C: CPT | Performed by: NURSE PRACTITIONER

## 2019-07-24 RX ORDER — BLOOD-GLUCOSE METER
KIT MISCELLANEOUS
Qty: 1 EACH | Refills: 11 | Status: SHIPPED | OUTPATIENT
Start: 2019-07-24

## 2019-07-24 NOTE — PROGRESS NOTES
Ginger Hardwick is a 68 y.o. male. he is here today for follow-up.    History of Present Illness         Duration/Timing:Diabetes mellitus type 2, age at onset of diabetes: 50 years, onset of symptoms gradual      timing constant       Quality not controlled  But improved       Severity high                  Severity (Complications/Hospitalizations)  Secondary Macrovascular Complications: No CAD, No CVA, No PAD  Secondary Microvascular Complications:Diabetic nephropathy, proteinuria, diabetic neuropathy, no diabetic retinopathy     Context  Diabetes Regimen:Insulin,       Lab Results   Component Value Date    HGBA1C 6.1 (H) 08/29/2018          Lab Results   Component Value Date    HGBA1C 6.0 07/24/2019          Blood Glucose Readings       Now on medtronic insulin pump       Downloaded and average bg - 123        No lows         Exercise:Does not execise     Associated Signs/Symptoms  Hyperglycemic Symptoms:polyuria, polydipsia, polyphagia, weight gain  Hypoglycemic Episodes:NO documented hypoglycemia since last visit; has  hypoglycemia unawareness     Aggravating , prednisone for SLE               The following portions of the patient's history were reviewed and updated as appropriate:   Past Medical History:   Diagnosis Date   • Diabetic neuropathy (CMS/[facility])    • Elevated blood pressure    • Hypercholesterolemia    • Lupus erythematosus    • Tobacco user    • Type 2 diabetes mellitus with hyperglycemia (CMS/HCC)    • Type 2 diabetes mellitus, uncontrolled (CMS/HCC)      Past Surgical History:   Procedure Laterality Date   • CARDIAC CATHETERIZATION  03/26/2014    No epicardial coronary artery disease. Normal LV systolic function. EF 55%. No wall motion abnormalities. Systemic hypertension.     Family History   Problem Relation Age of Onset   • Diabetes Other        Current Outpatient Medications   Medication Sig Dispense Refill   • amiodarone (PACERONE) 200 MG tablet Take 200 mg by mouth 2 (Two) Times  a Day.     • aspirin 81 MG chewable tablet Chew 81 mg daily.     • atorvastatin (LIPITOR) 80 MG tablet      • budesonide-formoterol (SYMBICORT) 160-4.5 MCG/ACT inhaler Inhale 2 puffs 2 (two) times a day.     • bumetanide (BUMEX) 2 MG tablet Take 2 mg by mouth 2 (Two) Times a Day.     • carvedilol (COREG) 25 MG tablet Take 25 mg by mouth 2 (two) times a day with meals.     • cetirizine (ZyrTEC) 10 MG tablet Take 10 mg by mouth daily.     • clindamycin (CLEOCIN) 300 MG capsule Take 300 mg by mouth 2 (Two) Times a Day.     • cloNIDine (CATAPRES) 0.2 MG tablet Take 0.2 mg by mouth 2 (two) times a day.     • CONTOUR NEXT TEST test strip USE 1 STRIP TO CHECK GLUCOSE 4 TIMES DAILY 120 each 6   • DULoxetine (CYMBALTA) 30 MG capsule      • esomeprazole (nexIUM) 20 MG capsule Take 20 mg by mouth Daily.     • fenofibrate 160 MG tablet      • furosemide (LASIX) 40 MG tablet Take 40 mg by mouth daily.     • HUMALOG 100 UNIT/ML injection 150 UNITS THROUGH INSULIN PUMP PER DAY 50 mL 0   • HYDROcodone-acetaminophen (NORCO) 7.5-325 MG per tablet      • insulin detemir (LEVEMIR) 100 UNIT/ML injection 50 units daily 2 each 3   • losartan (COZAAR) 25 MG tablet Take 25 mg by mouth Daily.  3   • metolazone (ZAROXOLYN) 2.5 MG tablet Take 2.5 mg by mouth.     • metOLazone (ZAROXOLYN) 5 MG tablet Take 5 mg by mouth As Needed.     • metoprolol tartrate (LOPRESSOR) 25 MG tablet      • Multiple Vitamins-Minerals (DAILY MULTIVITAMIN PO) Take 1 capsule by mouth daily.     • mycophenolate (CELLCEPT) 500 MG tablet Take  by mouth 3 (three) times a day.     • Omega-3 Fatty Acids (FISH OIL) 1000 MG capsule capsule Take 1,000 mg by mouth 2 (two) times a day.     • potassium chloride (K-DUR) 10 MEQ CR tablet      • predniSONE (DELTASONE) 10 MG tablet Take 10 mg by mouth Daily.     • PROAIR  (90 BASE) MCG/ACT inhaler      • SYMBICORT 80-4.5 MCG/ACT inhaler      • tamsulosin (FLOMAX) 0.4 MG capsule 24 hr capsule Take 1 capsule by mouth every  "night.     • TRUEPLUS INSULIN SYRINGE 31G X 5/16\" 1 ML misc      • vitamin D (ERGOCALCIFEROL) 31251 units capsule capsule Take 1 capsule by mouth Every 7 (Seven) Days. 4 capsule 5   • XARELTO 15 MG tablet Take 15 mg by mouth Daily.     • ZETIA 10 MG tablet      • zolpidem (AMBIEN) 10 MG tablet        No current facility-administered medications for this visit.      No Known Allergies  Social History     Socioeconomic History   • Marital status:      Spouse name: Not on file   • Number of children: Not on file   • Years of education: Not on file   • Highest education level: Not on file   Tobacco Use   • Smoking status: Current Every Day Smoker   • Smokeless tobacco: Never Used   • Tobacco comment: encouraged smoking cessation    Substance and Sexual Activity   • Alcohol use: No       Review of Systems  Review of Systems   Constitutional: Negative for activity change, appetite change, diaphoresis and fatigue.   HENT: Negative for facial swelling, sneezing, sore throat, tinnitus, trouble swallowing and voice change.    Eyes: Negative for photophobia, pain, discharge, redness, itching and visual disturbance.   Respiratory: Negative for apnea, cough, choking, chest tightness and shortness of breath.    Cardiovascular: Negative for chest pain, palpitations and leg swelling.   Gastrointestinal: Negative for abdominal distention, abdominal pain, constipation, diarrhea, nausea and vomiting.   Endocrine: Negative for cold intolerance, heat intolerance, polydipsia, polyphagia and polyuria.   Genitourinary: Negative for difficulty urinating, dysuria, frequency, hematuria and urgency.   Musculoskeletal: Negative for arthralgias, back pain, gait problem, joint swelling, myalgias, neck pain and neck stiffness.   Skin: Negative for color change, pallor, rash and wound.   Neurological: Negative for dizziness, tremors, weakness, light-headedness, numbness and headaches.   Hematological: Negative for adenopathy. Does not " "bruise/bleed easily.   Psychiatric/Behavioral: Negative for behavioral problems, confusion and sleep disturbance.        Objective    /80 (BP Location: Right arm, Patient Position: Sitting, Cuff Size: Adult)   Pulse 78   Ht 185.4 cm (73\")   Wt 126 kg (278 lb 11.2 oz)   SpO2 98%   BMI 36.77 kg/m²   Physical Exam   Constitutional: He is oriented to person, place, and time. He appears well-developed and well-nourished. No distress.   HENT:   Head: Normocephalic and atraumatic.   Right Ear: External ear normal.   Left Ear: External ear normal.   Nose: Nose normal.   Eyes: Conjunctivae and EOM are normal. Pupils are equal, round, and reactive to light.   Neck: Normal range of motion. Neck supple. No tracheal deviation present. No thyromegaly present.   Cardiovascular: Normal rate, regular rhythm and normal heart sounds.   No murmur heard.  Pulmonary/Chest: Effort normal and breath sounds normal. No respiratory distress. He has no wheezes.   Abdominal: Soft. Bowel sounds are normal. There is no tenderness. There is no rebound and no guarding.   Musculoskeletal: Normal range of motion. He exhibits no edema, tenderness or deformity.   Neurological: He is alert and oriented to person, place, and time. No cranial nerve deficit.   Skin: Skin is warm and dry. No rash noted.   Psychiatric: He has a normal mood and affect. His behavior is normal. Judgment and thought content normal.       Lab Review  Glucose   Date Value   08/29/2018 93 mg/dL   02/02/2018 104 mg/dL (H)   07/11/2016 148 mg/dl (H)   03/26/2014 101 mg/dl (H)   03/25/2014 135 mg/dl (H)     Sodium (mmol/L)   Date Value   08/29/2018 140   02/02/2018 142   07/11/2016 139   03/26/2014 138   03/25/2014 138     Potassium (mmol/L)   Date Value   08/29/2018 4.4   02/02/2018 4.7   07/11/2016 3.8   03/26/2014 3.9   03/25/2014 3.6     Chloride (mmol/L)   Date Value   08/29/2018 101   02/02/2018 103   07/11/2016 97   03/26/2014 101   03/25/2014 97     CO2 (mmol/L) "   Date Value   08/29/2018 31.0   02/02/2018 28.0   07/11/2016 35 (H)   03/26/2014 31   03/25/2014 32 (H)     BUN   Date Value   08/29/2018 31 mg/dL (H)   02/02/2018 53 mg/dL (H)   07/11/2016 43 mg/dl (H)   03/26/2014 22 mg/dl (H)   03/25/2014 25 mg/dl (H)     Creatinine   Date Value   08/29/2018 1.75 mg/dL (H)   02/02/2018 2.00 mg/dL (H)   07/11/2016 1.5 mg/dl (H)   03/26/2014 1.4 mg/dl (H)   03/25/2014 1.5 mg/dl (H)     Hemoglobin A1C   Date Value   08/29/2018 6.1 % (H)   01/03/2018 7.7 %   01/03/2018 7.7   07/11/2016 7.4 %TotHgb (H)     Triglycerides (mg/dl)   Date Value   07/11/2016 357 (H)   03/26/2014 371 (H)     LDL Cholesterol  (mg/dl)   Date Value   07/11/2016 62   03/26/2014 41       Assessment/Plan      1. Diabetes mellitus type 2, uncontrolled, with complications (CMS/[facility])    2. Mixed hyperlipidemia    3. Essential hypertension    .    Medications prescribed:  Outpatient Encounter Medications as of 7/24/2019   Medication Sig Dispense Refill   • amiodarone (PACERONE) 200 MG tablet Take 200 mg by mouth 2 (Two) Times a Day.     • aspirin 81 MG chewable tablet Chew 81 mg daily.     • atorvastatin (LIPITOR) 80 MG tablet      • budesonide-formoterol (SYMBICORT) 160-4.5 MCG/ACT inhaler Inhale 2 puffs 2 (two) times a day.     • bumetanide (BUMEX) 2 MG tablet Take 2 mg by mouth 2 (Two) Times a Day.     • carvedilol (COREG) 25 MG tablet Take 25 mg by mouth 2 (two) times a day with meals.     • cetirizine (ZyrTEC) 10 MG tablet Take 10 mg by mouth daily.     • clindamycin (CLEOCIN) 300 MG capsule Take 300 mg by mouth 2 (Two) Times a Day.     • cloNIDine (CATAPRES) 0.2 MG tablet Take 0.2 mg by mouth 2 (two) times a day.     • CONTOUR NEXT TEST test strip USE 1 STRIP TO CHECK GLUCOSE 4 TIMES DAILY 120 each 6   • DULoxetine (CYMBALTA) 30 MG capsule      • esomeprazole (nexIUM) 20 MG capsule Take 20 mg by mouth Daily.     • fenofibrate 160 MG tablet      • furosemide (LASIX) 40 MG tablet Take 40 mg by mouth daily.     •  "HUMALOG 100 UNIT/ML injection 150 UNITS THROUGH INSULIN PUMP PER DAY 50 mL 0   • HYDROcodone-acetaminophen (NORCO) 7.5-325 MG per tablet      • insulin detemir (LEVEMIR) 100 UNIT/ML injection 50 units daily 2 each 3   • losartan (COZAAR) 25 MG tablet Take 25 mg by mouth Daily.  3   • metolazone (ZAROXOLYN) 2.5 MG tablet Take 2.5 mg by mouth.     • metOLazone (ZAROXOLYN) 5 MG tablet Take 5 mg by mouth As Needed.     • metoprolol tartrate (LOPRESSOR) 25 MG tablet      • Multiple Vitamins-Minerals (DAILY MULTIVITAMIN PO) Take 1 capsule by mouth daily.     • mycophenolate (CELLCEPT) 500 MG tablet Take  by mouth 3 (three) times a day.     • Omega-3 Fatty Acids (FISH OIL) 1000 MG capsule capsule Take 1,000 mg by mouth 2 (two) times a day.     • potassium chloride (K-DUR) 10 MEQ CR tablet      • predniSONE (DELTASONE) 10 MG tablet Take 10 mg by mouth Daily.     • PROAIR  (90 BASE) MCG/ACT inhaler      • SYMBICORT 80-4.5 MCG/ACT inhaler      • tamsulosin (FLOMAX) 0.4 MG capsule 24 hr capsule Take 1 capsule by mouth every night.     • TRUEPLUS INSULIN SYRINGE 31G X 5/16\" 1 ML misc      • vitamin D (ERGOCALCIFEROL) 80979 units capsule capsule Take 1 capsule by mouth Every 7 (Seven) Days. 4 capsule 5   • XARELTO 15 MG tablet Take 15 mg by mouth Daily.     • ZETIA 10 MG tablet      • zolpidem (AMBIEN) 10 MG tablet        No facility-administered encounter medications on file as of 7/24/2019.        Orders placed during this encounter include:  Orders Placed This Encounter   Procedures   • POC Glycosylated Hemoglobin (Hb A1C)   Glycemic Management     Lab Results   Component Value Date    HGBA1C 6.1 (H) 08/29/2018             Now on Medtronic insulin pump         BASAL -     MN - 2.1  8 am - 2.0            Carb ratio      4.5        Sensitivity         20        Target BG       120 to 140        Previous regimen          Levemir 40 units and 45 units pm          Humalog  1 unit per every 5 grams of CHO      Lipid " Management        Triglycerides   Date Value Ref Range Status   07/11/2016 357 (H) 20 - 199 mg/dl Final     Comment:     TRIG AVERAGE RISK: 200 - 399 MG/DL     HDL Cholesterol   Date Value Ref Range Status   07/11/2016 37 (L) 60 - 200 mg/dl Final     Comment:     HDL AVERAGE RISK: 35 - 60 MG/DL     LDL Cholesterol    Date Value Ref Range Status   07/11/2016 62 0 - 129 mg/dl Final     Comment:     Calculated LDL results only valid if Triglycerides are < 400 mg/dL.  LDL DESIRED: < 130 MG/DL                Simvastatin  80 mg qhs    triglice  160 mg daily   zetia 10 mg daily     Blood Pressure Management:target blood pressure         on clonidine , lasix and coreg      follows with nephrology       On metolazone 2.5 mg three times weekly      On lasix 40 mg daily      Wheezing, not smoking     Take lasix 40 bid for 3 days      Microvascular Complication Monitoring: Microalbuminuria, No Diabetic Retinopathy, Diabetic Neuropathy       CKD stage III, nephrotic sx      Lyrica -- caused side effects      Gabapentin - caused side effects      No norco for pain -- prescribed by primary      Immunizations: Last pneumococcal immunization 2018     Preventive Care:patient is not smoking      Weight Related:Obesity , counseled nutrition, and physical activity         Patient's Body mass index is 36.77 kg/m². BMI is above normal parameters. Recommendations include: educational material.       Other Diabetes Related Aspects         Lab Results   Component Value Date    TSH 2.300 08/29/2018          Diagnosed with Lupus      Taking prednisone 5 mg daily                   4. Follow-up: Return in about 3 months (around 10/24/2019) for Recheck.

## 2019-07-26 ENCOUNTER — TELEPHONE (OUTPATIENT)
Dept: ENDOCRINOLOGY | Facility: CLINIC | Age: 69
End: 2019-07-26

## 2019-07-29 ENCOUNTER — TELEPHONE (OUTPATIENT)
Dept: ENDOCRINOLOGY | Facility: CLINIC | Age: 69
End: 2019-07-29

## 2019-11-27 DIAGNOSIS — E55.9 VITAMIN D DEFICIENCY: Primary | ICD-10-CM

## 2019-11-27 RX ORDER — ERGOCALCIFEROL 1.25 MG/1
50000 CAPSULE ORAL
Qty: 4 CAPSULE | Refills: 5 | Status: SHIPPED | OUTPATIENT
Start: 2019-11-27 | End: 2020-09-09 | Stop reason: SDUPTHER

## 2019-12-04 DIAGNOSIS — IMO0002 UNCONTROLLED TYPE 2 DIABETES MELLITUS WITH COMPLICATION, WITH LONG-TERM CURRENT USE OF INSULIN: ICD-10-CM

## 2020-01-17 ENCOUNTER — OFFICE VISIT (OUTPATIENT)
Dept: ENDOCRINOLOGY | Facility: CLINIC | Age: 70
End: 2020-01-17

## 2020-01-17 VITALS
WEIGHT: 275 LBS | OXYGEN SATURATION: 100 % | SYSTOLIC BLOOD PRESSURE: 136 MMHG | HEIGHT: 73 IN | HEART RATE: 59 BPM | DIASTOLIC BLOOD PRESSURE: 72 MMHG | BODY MASS INDEX: 36.45 KG/M2

## 2020-01-17 DIAGNOSIS — E11.649 TYPE 2 DIABETES MELLITUS WITH HYPOGLYCEMIA WITHOUT COMA, WITH LONG-TERM CURRENT USE OF INSULIN (HCC): Primary | ICD-10-CM

## 2020-01-17 DIAGNOSIS — Z79.4 TYPE 2 DIABETES MELLITUS WITH HYPOGLYCEMIA WITHOUT COMA, WITH LONG-TERM CURRENT USE OF INSULIN (HCC): Primary | ICD-10-CM

## 2020-01-17 DIAGNOSIS — E66.9 CLASS 2 OBESITY WITHOUT SERIOUS COMORBIDITY WITH BODY MASS INDEX (BMI) OF 36.0 TO 36.9 IN ADULT, UNSPECIFIED OBESITY TYPE: ICD-10-CM

## 2020-01-17 LAB — HBA1C MFR BLD: 5.9 %

## 2020-01-17 PROCEDURE — 99214 OFFICE O/P EST MOD 30 MIN: CPT | Performed by: NURSE PRACTITIONER

## 2020-01-17 PROCEDURE — 83036 HEMOGLOBIN GLYCOSYLATED A1C: CPT | Performed by: NURSE PRACTITIONER

## 2020-01-17 NOTE — PROGRESS NOTES
Ginger Hardwick is a 69 y.o. male. he is here today for follow-up.    History of Present Illness     Reason - diabetes         Duration/Timing:Diabetes mellitus type 2, age at onset of diabetes: 50 years, onset of symptoms gradual      timing constant       Quality   -- near control       Severity high         complications - hypoglycemia -- having early morning low          Severity (Complications/Hospitalizations)  Secondary Macrovascular Complications: No CAD, No CVA, No PAD  Secondary Microvascular Complications:Diabetic nephropathy, proteinuria, diabetic neuropathy, no diabetic retinopathy     Context  Diabetes Regimen:Insulin,     Lab Results   Component Value Date    HGBA1C 5.9 01/17/2020                Blood Glucose Readings       Now on medtronic insulin pump       Downloaded and average bg - 105      had had lows in the early mornings             Exercise:Does not execise     Associated Signs/Symptoms  Hyperglycemic Symptoms:none  Hypoglycemic Episodes: early morning hypoglycemia      Aggravating , prednisone for SLE                The following portions of the patient's history were reviewed and updated as appropriate:   Past Medical History:   Diagnosis Date   • Diabetic neuropathy (CMS/HCC)    • Elevated blood pressure    • Hypercholesterolemia    • Lupus erythematosus    • Tobacco user    • Type 2 diabetes mellitus with hyperglycemia (CMS/[facility])    • Type 2 diabetes mellitus, uncontrolled (CMS/HCC)      Past Surgical History:   Procedure Laterality Date   • CARDIAC CATHETERIZATION  03/26/2014    No epicardial coronary artery disease. Normal LV systolic function. EF 55%. No wall motion abnormalities. Systemic hypertension.     Family History   Problem Relation Age of Onset   • Diabetes Other        Current Outpatient Medications   Medication Sig Dispense Refill   • amiodarone (PACERONE) 200 MG tablet Take 200 mg by mouth 2 (Two) Times a Day.     • aspirin 81 MG chewable tablet Chew 81 mg  daily.     • atorvastatin (LIPITOR) 80 MG tablet      • budesonide-formoterol (SYMBICORT) 160-4.5 MCG/ACT inhaler Inhale 2 puffs 2 (two) times a day.     • bumetanide (BUMEX) 2 MG tablet Take 2 mg by mouth 2 (Two) Times a Day.     • carvedilol (COREG) 25 MG tablet Take 25 mg by mouth 2 (two) times a day with meals.     • cetirizine (ZyrTEC) 10 MG tablet Take 10 mg by mouth daily.     • clindamycin (CLEOCIN) 300 MG capsule Take 300 mg by mouth 2 (Two) Times a Day.     • cloNIDine (CATAPRES) 0.2 MG tablet Take 0.2 mg by mouth 2 (two) times a day.     • CONTOUR NEXT TEST test strip USE 1 STRIP TO CHECK GLUCOSE 4 TIMES DAILY 120 each 6   • DULoxetine (CYMBALTA) 30 MG capsule      • esomeprazole (nexIUM) 20 MG capsule Take 20 mg by mouth Daily.     • fenofibrate 160 MG tablet      • furosemide (LASIX) 40 MG tablet Take 40 mg by mouth daily.     • glucose monitor monitoring kit [employer] CONTOUR NEXT METER.  USE TO TEST 4 TIMES A DAY.  DX-E11.8 1 each 11   • HUMALOG 100 UNIT/ML injection 150 UNITS THROUGH INSULIN PUMP PER  mL 2   • HYDROcodone-acetaminophen (NORCO) 7.5-325 MG per tablet      • insulin detemir (LEVEMIR) 100 UNIT/ML injection 50 units daily 2 each 3   • losartan (COZAAR) 25 MG tablet Take 25 mg by mouth Daily.  3   • metolazone (ZAROXOLYN) 2.5 MG tablet Take 2.5 mg by mouth.     • metOLazone (ZAROXOLYN) 5 MG tablet Take 5 mg by mouth As Needed.     • metoprolol tartrate (LOPRESSOR) 25 MG tablet      • Multiple Vitamins-Minerals (DAILY MULTIVITAMIN PO) Take 1 capsule by mouth daily.     • mycophenolate (CELLCEPT) 500 MG tablet Take  by mouth 3 (three) times a day.     • Omega-3 Fatty Acids (FISH OIL) 1000 MG capsule capsule Take 1,000 mg by mouth 2 (two) times a day.     • potassium chloride (K-DUR) 10 MEQ CR tablet      • predniSONE (DELTASONE) 10 MG tablet Take 10 mg by mouth Daily.     • PROAIR  (90 BASE) MCG/ACT inhaler      • SYMBICORT 80-4.5 MCG/ACT inhaler      • tamsulosin (FLOMAX) 0.4 MG  "capsule 24 hr capsule Take 1 capsule by mouth every night.     • TRUEPLUS INSULIN SYRINGE 31G X 5/16\" 1 ML misc      • vitamin D (ERGOCALCIFEROL) 1.25 MG (28998 UT) capsule capsule Take 1 capsule by mouth Every 7 (Seven) Days. 4 capsule 5   • XARELTO 15 MG tablet Take 15 mg by mouth Daily.     • ZETIA 10 MG tablet      • zolpidem (AMBIEN) 10 MG tablet        No current facility-administered medications for this visit.      No Known Allergies  Social History     Socioeconomic History   • Marital status:      Spouse name: Not on file   • Number of children: Not on file   • Years of education: Not on file   • Highest education level: Not on file   Tobacco Use   • Smoking status: Current Every Day Smoker   • Smokeless tobacco: Never Used   • Tobacco comment: encouraged smoking cessation    Substance and Sexual Activity   • Alcohol use: No       Review of Systems  Review of Systems   Constitutional: Negative for activity change, appetite change, diaphoresis and fatigue.   HENT: Negative for facial swelling, sneezing, sore throat, tinnitus, trouble swallowing and voice change.    Eyes: Negative for photophobia, pain, discharge, redness, itching and visual disturbance.   Respiratory: Negative for apnea, cough, choking, chest tightness and shortness of breath.    Cardiovascular: Negative for chest pain, palpitations and leg swelling.   Gastrointestinal: Negative for abdominal distention, abdominal pain, constipation, diarrhea, nausea and vomiting.   Endocrine: Negative for cold intolerance, heat intolerance, polydipsia, polyphagia and polyuria.   Genitourinary: Negative for difficulty urinating, dysuria, frequency, hematuria and urgency.   Musculoskeletal: Negative for arthralgias, back pain, gait problem, joint swelling, myalgias, neck pain and neck stiffness.   Skin: Negative for color change, pallor, rash and wound.   Neurological: Negative for dizziness, tremors, weakness, light-headedness, numbness and " "headaches.   Hematological: Negative for adenopathy. Does not bruise/bleed easily.   Psychiatric/Behavioral: Negative for behavioral problems, confusion and sleep disturbance.        Objective    /72   Pulse 59   Ht 185.4 cm (73\")   Wt 125 kg (275 lb)   SpO2 100%   BMI 36.28 kg/m²   Physical Exam   Constitutional: He is oriented to person, place, and time. He appears well-developed and well-nourished. No distress.   HENT:   Head: Normocephalic and atraumatic.   Right Ear: External ear normal.   Left Ear: External ear normal.   Nose: Nose normal.   Eyes: Pupils are equal, round, and reactive to light. Conjunctivae and EOM are normal.   Neck: Normal range of motion. Neck supple. No tracheal deviation present. No thyromegaly present.   Cardiovascular: Normal rate, regular rhythm and normal heart sounds.   No murmur heard.  Pulmonary/Chest: Effort normal and breath sounds normal. No respiratory distress. He has no wheezes.   Abdominal: Soft. Bowel sounds are normal. There is no tenderness. There is no rebound and no guarding.   Musculoskeletal: Normal range of motion. He exhibits no edema, tenderness or deformity.   Neurological: He is alert and oriented to person, place, and time. No cranial nerve deficit.   Skin: Skin is warm and dry. No rash noted.   Psychiatric: He has a normal mood and affect. His behavior is normal. Judgment and thought content normal.       Lab Review  Glucose   Date Value   08/29/2018 93 mg/dL   02/02/2018 104 mg/dL (H)   07/11/2016 148 mg/dl (H)   03/26/2014 101 mg/dl (H)   03/25/2014 135 mg/dl (H)     Sodium (mmol/L)   Date Value   08/29/2018 140   02/02/2018 142   07/11/2016 139   03/26/2014 138   03/25/2014 138     Potassium (mmol/L)   Date Value   08/29/2018 4.4   02/02/2018 4.7   07/11/2016 3.8   03/26/2014 3.9   03/25/2014 3.6     Chloride (mmol/L)   Date Value   08/29/2018 101   02/02/2018 103   07/11/2016 97   03/26/2014 101   03/25/2014 97     CO2 (mmol/L)   Date Value "   08/29/2018 31.0   02/02/2018 28.0   07/11/2016 35 (H)   03/26/2014 31   03/25/2014 32 (H)     BUN   Date Value   08/29/2018 31 mg/dL (H)   02/02/2018 53 mg/dL (H)   07/11/2016 43 mg/dl (H)   03/26/2014 22 mg/dl (H)   03/25/2014 25 mg/dl (H)     Creatinine   Date Value   08/29/2018 1.75 mg/dL (H)   02/02/2018 2.00 mg/dL (H)   07/11/2016 1.5 mg/dl (H)   03/26/2014 1.4 mg/dl (H)   03/25/2014 1.5 mg/dl (H)     Hemoglobin A1C   Date Value   01/17/2020 5.9 %   07/24/2019 6.0 %   08/29/2018 6.1 % (H)   01/03/2018 7.7 %   01/03/2018 7.7   07/11/2016 7.4 %TotHgb (H)     Triglycerides (mg/dl)   Date Value   07/11/2016 357 (H)   03/26/2014 371 (H)     LDL Cholesterol  (mg/dl)   Date Value   07/11/2016 62   03/26/2014 41       Assessment/Plan      1. Type 2 diabetes mellitus with hypoglycemia without coma, with long-term current use of insulin (CMS/[facility])    2. Class 2 obesity without serious comorbidity with body mass index (BMI) of 36.0 to 36.9 in adult, unspecified obesity type    .    Medications prescribed:  Outpatient Encounter Medications as of 1/17/2020   Medication Sig Dispense Refill   • amiodarone (PACERONE) 200 MG tablet Take 200 mg by mouth 2 (Two) Times a Day.     • aspirin 81 MG chewable tablet Chew 81 mg daily.     • atorvastatin (LIPITOR) 80 MG tablet      • budesonide-formoterol (SYMBICORT) 160-4.5 MCG/ACT inhaler Inhale 2 puffs 2 (two) times a day.     • bumetanide (BUMEX) 2 MG tablet Take 2 mg by mouth 2 (Two) Times a Day.     • carvedilol (COREG) 25 MG tablet Take 25 mg by mouth 2 (two) times a day with meals.     • cetirizine (ZyrTEC) 10 MG tablet Take 10 mg by mouth daily.     • clindamycin (CLEOCIN) 300 MG capsule Take 300 mg by mouth 2 (Two) Times a Day.     • cloNIDine (CATAPRES) 0.2 MG tablet Take 0.2 mg by mouth 2 (two) times a day.     • CONTOUR NEXT TEST test strip USE 1 STRIP TO CHECK GLUCOSE 4 TIMES DAILY 120 each 6   • DULoxetine (CYMBALTA) 30 MG capsule      • esomeprazole (nexIUM) 20 MG  "capsule Take 20 mg by mouth Daily.     • fenofibrate 160 MG tablet      • furosemide (LASIX) 40 MG tablet Take 40 mg by mouth daily.     • glucose monitor monitoring kit [employer] CONTOUR NEXT METER.  USE TO TEST 4 TIMES A DAY.  DX-E11.8 1 each 11   • HUMALOG 100 UNIT/ML injection 150 UNITS THROUGH INSULIN PUMP PER  mL 2   • HYDROcodone-acetaminophen (NORCO) 7.5-325 MG per tablet      • insulin detemir (LEVEMIR) 100 UNIT/ML injection 50 units daily 2 each 3   • losartan (COZAAR) 25 MG tablet Take 25 mg by mouth Daily.  3   • metolazone (ZAROXOLYN) 2.5 MG tablet Take 2.5 mg by mouth.     • metOLazone (ZAROXOLYN) 5 MG tablet Take 5 mg by mouth As Needed.     • metoprolol tartrate (LOPRESSOR) 25 MG tablet      • Multiple Vitamins-Minerals (DAILY MULTIVITAMIN PO) Take 1 capsule by mouth daily.     • mycophenolate (CELLCEPT) 500 MG tablet Take  by mouth 3 (three) times a day.     • Omega-3 Fatty Acids (FISH OIL) 1000 MG capsule capsule Take 1,000 mg by mouth 2 (two) times a day.     • potassium chloride (K-DUR) 10 MEQ CR tablet      • predniSONE (DELTASONE) 10 MG tablet Take 10 mg by mouth Daily.     • PROAIR  (90 BASE) MCG/ACT inhaler      • SYMBICORT 80-4.5 MCG/ACT inhaler      • tamsulosin (FLOMAX) 0.4 MG capsule 24 hr capsule Take 1 capsule by mouth every night.     • TRUEPLUS INSULIN SYRINGE 31G X 5/16\" 1 ML misc      • vitamin D (ERGOCALCIFEROL) 1.25 MG (10530 UT) capsule capsule Take 1 capsule by mouth Every 7 (Seven) Days. 4 capsule 5   • XARELTO 15 MG tablet Take 15 mg by mouth Daily.     • ZETIA 10 MG tablet      • zolpidem (AMBIEN) 10 MG tablet        No facility-administered encounter medications on file as of 1/17/2020.        Orders placed during this encounter include:  Orders Placed This Encounter   Procedures   • POC Glycosylated Hemoglobin (Hb A1C)     Glycemic Management           Lab Results   Component Value Date     HGBA1C 6.1 (H) 08/29/2018            Lab Results   Component Value Date    " HGBA1C 5.9 01/17/2020          Now on Medtronic insulin pump        Changes in bold      BASAL -     MN - 2.1---- change to 2.0  8 am - 2.0            Carb ratio      4.5        Sensitivity         20        Target BG       120 to 140        Previous regimen          Levemir 40 units and 45 units pm          Humalog  1 unit per every 5 grams of CHO      Lipid Management                Triglycerides   Date Value Ref Range Status   07/11/2016 357 (H) 20 - 199 mg/dl Final       Comment:       TRIG AVERAGE RISK: 200 - 399 MG/DL              HDL Cholesterol   Date Value Ref Range Status   07/11/2016 37 (L) 60 - 200 mg/dl Final       Comment:       HDL AVERAGE RISK: 35 - 60 MG/DL              LDL Cholesterol    Date Value Ref Range Status   07/11/2016 62 0 - 129 mg/dl Final       Comment:       Calculated LDL results only valid if Triglycerides are < 400 mg/dL.  LDL DESIRED: < 130 MG/DL                  Simvastatin  80 mg qhs    triglice  160 mg daily   zetia 10 mg daily     Blood Pressure Management:target blood pressure         on clonidine , lasix and coreg      follows with nephrology       On metolazone 2.5 mg three times weekly      On lasix 40 mg daily      Wheezing, not smoking     Take lasix 40 bid for 3 days            Microvascular Complication Monitoring: Microalbuminuria, No Diabetic Retinopathy, Diabetic Neuropathy       CKD stage III, nephrotic sx      Lyrica -- caused side effects      Gabapentin - caused side effects      On  norco for pain -- prescribed by primary          Immunizations: Last pneumococcal immunization 2018         Preventive Care:patient is not smoking                Weight Related:Obesity , counseled nutrition, and physical activity      Patient's Body mass index is 36.28 kg/m². BMI is above normal parameters. Recommendations include: educational material.    Decrease daily caloric intake by 500 calories per day             Other Diabetes Related Aspects               Lab Results    Component Value Date     TSH 2.300 08/29/2018            Diagnosed with Lupus      Taking prednisone 5 mg daily                   4. Follow-up: Return in about 3 months (around 4/17/2020).

## 2020-05-04 ENCOUNTER — TELEMEDICINE (OUTPATIENT)
Dept: ENDOCRINOLOGY | Facility: CLINIC | Age: 70
End: 2020-05-04

## 2020-05-04 DIAGNOSIS — F17.200 SMOKER: ICD-10-CM

## 2020-05-04 DIAGNOSIS — Z79.4 TYPE 2 DIABETES MELLITUS WITH HYPERGLYCEMIA, WITH LONG-TERM CURRENT USE OF INSULIN (HCC): Primary | ICD-10-CM

## 2020-05-04 DIAGNOSIS — E11.65 TYPE 2 DIABETES MELLITUS WITH HYPERGLYCEMIA, WITH LONG-TERM CURRENT USE OF INSULIN (HCC): Primary | ICD-10-CM

## 2020-05-04 PROCEDURE — 99214 OFFICE O/P EST MOD 30 MIN: CPT | Performed by: NURSE PRACTITIONER

## 2020-05-04 NOTE — PROGRESS NOTES
Ginger Hardwick is a 69 y.o. male. he is here for follow up     History of Present Illness     You have chosen to receive care through a telehealth visit.  Do you consent to use a video/audio connection for your medical care today? Yes           TELEHEALTH VIDEO VISIT     This a video visit due to [facility] current guidelines for social distancing due to the COVID 19 pandemic          Reason - diabetes            Duration/Timing:Diabetes mellitus type 2, age at onset of diabetes: 50 years, onset of symptoms gradual      timing constant       Quality   -- near control       Severity high         complications - occasional hyperglycemia         Severity (Complications/Hospitalizations)  Secondary Macrovascular Complications: No CAD, No CVA, No PAD    Secondary Microvascular Complications:Diabetic nephropathy, proteinuria, diabetic neuropathy, no diabetic retinopathy     Context- routine lab work up       Diabetes Regimen:Insulin through insulin pump            Lab Results   Component Value Date     HGBA1C 5.9 01/17/2020                  Blood Glucose Readings       Now on medtronic insulin pump      Unable to download pump    He states for the most part it is under control     Usually 90 up to 110    He has had occasional spike to 200    Eating more since pandemic            Exercise:Does not execise     Associated Signs/Symptoms  Hyperglycemic Symptoms:none  Hypoglycemic Episodes: early morning hypoglycemia      Aggravating , prednisone for SLE            The following portions of the patient's history were reviewed and updated as appropriate:   Past Medical History:   Diagnosis Date   • Diabetic neuropathy (CMS/HCC)    • Elevated blood pressure    • Hypercholesterolemia    • Lupus erythematosus    • Tobacco user    • Type 2 diabetes mellitus with hyperglycemia (CMS/HCC)    • Type 2 diabetes mellitus, uncontrolled (CMS/HCC)      Past Surgical History:   Procedure Laterality Date   • CARDIAC CATHETERIZATION   03/26/2014    No epicardial coronary artery disease. Normal LV systolic function. EF 55%. No wall motion abnormalities. Systemic hypertension.     Family History   Problem Relation Age of Onset   • Diabetes Other        Current Outpatient Medications   Medication Sig Dispense Refill   • amiodarone (PACERONE) 200 MG tablet Take 200 mg by mouth 2 (Two) Times a Day.     • aspirin 81 MG chewable tablet Chew 81 mg daily.     • atorvastatin (LIPITOR) 80 MG tablet      • budesonide-formoterol (SYMBICORT) 160-4.5 MCG/ACT inhaler Inhale 2 puffs 2 (two) times a day.     • bumetanide (BUMEX) 2 MG tablet Take 2 mg by mouth 2 (Two) Times a Day.     • carvedilol (COREG) 25 MG tablet Take 25 mg by mouth 2 (two) times a day with meals.     • cetirizine (ZyrTEC) 10 MG tablet Take 10 mg by mouth daily.     • clindamycin (CLEOCIN) 300 MG capsule Take 300 mg by mouth 2 (Two) Times a Day.     • cloNIDine (CATAPRES) 0.2 MG tablet Take 0.2 mg by mouth 2 (two) times a day.     • CONTOUR NEXT TEST test strip USE 1 STRIP TO CHECK GLUCOSE 4 TIMES DAILY 120 each 6   • DULoxetine (CYMBALTA) 30 MG capsule      • esomeprazole (nexIUM) 20 MG capsule Take 20 mg by mouth Daily.     • fenofibrate 160 MG tablet      • furosemide (LASIX) 40 MG tablet Take 40 mg by mouth daily.     • glucose monitor monitoring kit [employer] CONTOUR NEXT METER.  USE TO TEST 4 TIMES A DAY.  DX-E11.8 1 each 11   • HUMALOG 100 UNIT/ML injection 150 UNITS THROUGH INSULIN PUMP PER  mL 2   • HYDROcodone-acetaminophen (NORCO) 7.5-325 MG per tablet      • insulin detemir (LEVEMIR) 100 UNIT/ML injection 50 units daily 2 each 3   • losartan (COZAAR) 25 MG tablet Take 25 mg by mouth Daily.  3   • metolazone (ZAROXOLYN) 2.5 MG tablet Take 2.5 mg by mouth.     • metOLazone (ZAROXOLYN) 5 MG tablet Take 5 mg by mouth As Needed.     • metoprolol tartrate (LOPRESSOR) 25 MG tablet      • Multiple Vitamins-Minerals (DAILY MULTIVITAMIN PO) Take 1 capsule by mouth daily.     •  "mycophenolate (CELLCEPT) 500 MG tablet Take  by mouth 3 (three) times a day.     • Omega-3 Fatty Acids (FISH OIL) 1000 MG capsule capsule Take 1,000 mg by mouth 2 (two) times a day.     • potassium chloride (K-DUR) 10 MEQ CR tablet      • predniSONE (DELTASONE) 10 MG tablet Take 10 mg by mouth Daily.     • PROAIR  (90 BASE) MCG/ACT inhaler      • SYMBICORT 80-4.5 MCG/ACT inhaler      • tamsulosin (FLOMAX) 0.4 MG capsule 24 hr capsule Take 1 capsule by mouth every night.     • TRUEPLUS INSULIN SYRINGE 31G X 5/16\" 1 ML misc      • vitamin D (ERGOCALCIFEROL) 1.25 MG (10181 UT) capsule capsule Take 1 capsule by mouth Every 7 (Seven) Days. 4 capsule 5   • XARELTO 15 MG tablet Take 15 mg by mouth Daily.     • ZETIA 10 MG tablet      • zolpidem (AMBIEN) 10 MG tablet        No current facility-administered medications for this visit.      No Known Allergies  Social History     Socioeconomic History   • Marital status:      Spouse name: Not on file   • Number of children: Not on file   • Years of education: Not on file   • Highest education level: Not on file   Tobacco Use   • Smoking status: Current Every Day Smoker   • Smokeless tobacco: Never Used   • Tobacco comment: encouraged smoking cessation    Substance and Sexual Activity   • Alcohol use: No       Review of Systems  Review of Systems   Constitutional: Negative for activity change, appetite change, diaphoresis and fatigue.   HENT: Negative for facial swelling, sneezing, sore throat, tinnitus, trouble swallowing and voice change.    Eyes: Negative for photophobia, pain, discharge, redness, itching and visual disturbance.   Respiratory: Negative for apnea, cough, choking, chest tightness and shortness of breath.    Cardiovascular: Negative for chest pain, palpitations and leg swelling.   Gastrointestinal: Negative for abdominal distention, abdominal pain, constipation, diarrhea, nausea and vomiting.   Endocrine: Negative for cold intolerance, heat " intolerance, polydipsia, polyphagia and polyuria.   Genitourinary: Negative for difficulty urinating, dysuria, frequency, hematuria and urgency.   Musculoskeletal: Negative for arthralgias, back pain, gait problem, joint swelling, myalgias, neck pain and neck stiffness.   Skin: Negative for color change, pallor, rash and wound.   Neurological: Negative for dizziness, tremors, weakness, light-headedness, numbness and headaches.   Hematological: Negative for adenopathy. Does not bruise/bleed easily.   Psychiatric/Behavioral: Negative for behavioral problems, confusion and sleep disturbance.        Objective    There were no vitals taken for this visit.  Physical Exam   Constitutional: He is oriented to person, place, and time. He appears well-developed and well-nourished. No distress.   HENT:   Head: Normocephalic and atraumatic.   Right Ear: External ear normal.   Left Ear: External ear normal.   Nose: Nose normal.   Mouth/Throat: Oropharynx is clear and moist.   Eyes: Pupils are equal, round, and reactive to light. Conjunctivae and EOM are normal.   Neck: Normal range of motion.   Pulmonary/Chest: Effort normal. No respiratory distress.   Musculoskeletal: Normal range of motion.   Neurological: He is alert and oriented to person, place, and time.   Skin: No pallor.   Psychiatric: He has a normal mood and affect. His behavior is normal. Judgment and thought content normal.       Lab Review  Glucose   Date Value   08/29/2018 93 mg/dL   02/02/2018 104 mg/dL (H)   07/11/2016 148 mg/dl (H)   03/26/2014 101 mg/dl (H)   03/25/2014 135 mg/dl (H)     Sodium (mmol/L)   Date Value   08/29/2018 140   02/02/2018 142   07/11/2016 139   03/26/2014 138   03/25/2014 138     Potassium (mmol/L)   Date Value   08/29/2018 4.4   02/02/2018 4.7   07/11/2016 3.8   03/26/2014 3.9   03/25/2014 3.6     Chloride (mmol/L)   Date Value   08/29/2018 101   02/02/2018 103   07/11/2016 97   03/26/2014 101   03/25/2014 97     CO2 (mmol/L)   Date  Value   08/29/2018 31.0   02/02/2018 28.0   07/11/2016 35 (H)   03/26/2014 31   03/25/2014 32 (H)     BUN   Date Value   08/29/2018 31 mg/dL (H)   02/02/2018 53 mg/dL (H)   07/11/2016 43 mg/dl (H)   03/26/2014 22 mg/dl (H)   03/25/2014 25 mg/dl (H)     Creatinine   Date Value   08/29/2018 1.75 mg/dL (H)   02/02/2018 2.00 mg/dL (H)   07/11/2016 1.5 mg/dl (H)   03/26/2014 1.4 mg/dl (H)   03/25/2014 1.5 mg/dl (H)     Hemoglobin A1C   Date Value   01/17/2020 5.9 %   07/24/2019 6.0 %   08/29/2018 6.1 % (H)   01/03/2018 7.7 %   01/03/2018 7.7   07/11/2016 7.4 %TotHgb (H)     Triglycerides (mg/dl)   Date Value   07/11/2016 357 (H)   03/26/2014 371 (H)     LDL Cholesterol  (mg/dl)   Date Value   07/11/2016 62   03/26/2014 41       Assessment/Plan      1. Type 2 diabetes mellitus with hyperglycemia, with long-term current use of insulin (CMS/[facility])    2. Smoker    .    Medications prescribed:  Outpatient Encounter Medications as of 5/4/2020   Medication Sig Dispense Refill   • amiodarone (PACERONE) 200 MG tablet Take 200 mg by mouth 2 (Two) Times a Day.     • aspirin 81 MG chewable tablet Chew 81 mg daily.     • atorvastatin (LIPITOR) 80 MG tablet      • budesonide-formoterol (SYMBICORT) 160-4.5 MCG/ACT inhaler Inhale 2 puffs 2 (two) times a day.     • bumetanide (BUMEX) 2 MG tablet Take 2 mg by mouth 2 (Two) Times a Day.     • carvedilol (COREG) 25 MG tablet Take 25 mg by mouth 2 (two) times a day with meals.     • cetirizine (ZyrTEC) 10 MG tablet Take 10 mg by mouth daily.     • clindamycin (CLEOCIN) 300 MG capsule Take 300 mg by mouth 2 (Two) Times a Day.     • cloNIDine (CATAPRES) 0.2 MG tablet Take 0.2 mg by mouth 2 (two) times a day.     • CONTOUR NEXT TEST test strip USE 1 STRIP TO CHECK GLUCOSE 4 TIMES DAILY 120 each 6   • DULoxetine (CYMBALTA) 30 MG capsule      • esomeprazole (nexIUM) 20 MG capsule Take 20 mg by mouth Daily.     • fenofibrate 160 MG tablet      • furosemide (LASIX) 40 MG tablet Take 40 mg by mouth  "daily.     • glucose monitor monitoring kit [employer]ES CONTOUR NEXT METER.  USE TO TEST 4 TIMES A DAY.  DX-E11.8 1 each 11   • HUMALOG 100 UNIT/ML injection 150 UNITS THROUGH INSULIN PUMP PER  mL 2   • HYDROcodone-acetaminophen (NORCO) 7.5-325 MG per tablet      • insulin detemir (LEVEMIR) 100 UNIT/ML injection 50 units daily 2 each 3   • losartan (COZAAR) 25 MG tablet Take 25 mg by mouth Daily.  3   • metolazone (ZAROXOLYN) 2.5 MG tablet Take 2.5 mg by mouth.     • metOLazone (ZAROXOLYN) 5 MG tablet Take 5 mg by mouth As Needed.     • metoprolol tartrate (LOPRESSOR) 25 MG tablet      • Multiple Vitamins-Minerals (DAILY MULTIVITAMIN PO) Take 1 capsule by mouth daily.     • mycophenolate (CELLCEPT) 500 MG tablet Take  by mouth 3 (three) times a day.     • Omega-3 Fatty Acids (FISH OIL) 1000 MG capsule capsule Take 1,000 mg by mouth 2 (two) times a day.     • potassium chloride (K-DUR) 10 MEQ CR tablet      • predniSONE (DELTASONE) 10 MG tablet Take 10 mg by mouth Daily.     • PROAIR  (90 BASE) MCG/ACT inhaler      • SYMBICORT 80-4.5 MCG/ACT inhaler      • tamsulosin (FLOMAX) 0.4 MG capsule 24 hr capsule Take 1 capsule by mouth every night.     • TRUEPLUS INSULIN SYRINGE 31G X 5/16\" 1 ML misc      • vitamin D (ERGOCALCIFEROL) 1.25 MG (93531 UT) capsule capsule Take 1 capsule by mouth Every 7 (Seven) Days. 4 capsule 5   • XARELTO 15 MG tablet Take 15 mg by mouth Daily.     • ZETIA 10 MG tablet      • zolpidem (AMBIEN) 10 MG tablet        No facility-administered encounter medications on file as of 5/4/2020.        Orders placed during this encounter include:  No orders of the defined types were placed in this encounter.    Glycemic Management                   Lab Results   Component Value Date     HGBA1C 5.9 01/17/2020            Medtronic insulin pump           BASAL -     MN -2.0  8 am - 2.0            Carb ratio      4.5        Sensitivity         20        Target BG       120 to 140    No changes today "     He will cut back on carbs    If still having trouble with the occasional 200 he will call me and we will adjust the pump         Previous regimen          Levemir 40 units and 45 units pm          Humalog  1 unit per every 5 grams of CHO      Lipid Management        Lab Results   Component Value Date    LDL 62 07/11/2016                   Simvastatin  80 mg qhs    triglice  160 mg daily   zetia 10 mg daily     Blood Pressure Management:t           follows with nephrology       On metolazone 2.5 mg three times weekly      On lasix 40 mg daily                    Microvascular Complication Monitoring      CKD stage III, nephrotic sx       Neuropathy      Lyrica -- caused side effects      Gabapentin - caused side effects      On  norco for pain -- prescribed by primary       No cuts or sores on feet       last eye exam -- Feb. 2020 , no DR       No results found for: KEE [id number]     Immunizations: Last pneumococcal immunization 2018           Preventive Care:patient is not smoking          Jovan Hardwick  reports that he has been smoking. He has never used smokeless tobacco.. I have educated him on the risk of diseases from using tobacco products such as COPD, lung cancer, heart disease .     I advised him to quit and he is not willing to quit     I spent less than 3 minutes counseling the patient.                Weight Related:Obesity , counseled nutrition, and physical activity         There is no height or weight on file to calculate BMI.             Other Diabetes Related Aspects      Lab Results   Component Value Date    TSH 2.300 08/29/2018                Lupus      Taking prednisone 5 mg daily           We spent 25 minutes including reviewing the patients electronic chart, reviewing medications, reviewing labs, active problems    I provided advice regarding diabetes management, dietary changes, adjustments of medication, self titration of insulin, refilled medications, ordering labs, future  appointments    Patient was advised to contact the office if there are unanswered questions, trouble with blood glucose management, or any concerns       4. Follow-up: Return in about 3 months (around 8/4/2020) for Recheck.

## 2020-06-10 ENCOUNTER — TELEPHONE (OUTPATIENT)
Dept: ENDOCRINOLOGY | Facility: CLINIC | Age: 70
End: 2020-06-10

## 2020-06-10 RX ORDER — PERPHENAZINE 16 MG/1
TABLET, FILM COATED ORAL
Qty: 200 EACH | Refills: 0 | Status: SHIPPED | OUTPATIENT
Start: 2020-06-10 | End: 2020-08-19 | Stop reason: SDUPTHER

## 2020-08-19 ENCOUNTER — TELEPHONE (OUTPATIENT)
Dept: ENDOCRINOLOGY | Facility: CLINIC | Age: 70
End: 2020-08-19

## 2020-08-24 RX ORDER — PERPHENAZINE 16 MG/1
TABLET, FILM COATED ORAL
Qty: 200 EACH | Refills: 0 | Status: SHIPPED | OUTPATIENT
Start: 2020-08-24 | End: 2020-10-21 | Stop reason: SDUPTHER

## 2020-09-09 DIAGNOSIS — E55.9 VITAMIN D DEFICIENCY: ICD-10-CM

## 2020-09-09 RX ORDER — ERGOCALCIFEROL 1.25 MG/1
50000 CAPSULE ORAL
Qty: 4 CAPSULE | Refills: 5 | Status: SHIPPED | OUTPATIENT
Start: 2020-09-09

## 2020-10-21 ENCOUNTER — TELEPHONE (OUTPATIENT)
Dept: ENDOCRINOLOGY | Facility: CLINIC | Age: 70
End: 2020-10-21

## 2020-10-21 ENCOUNTER — TELEMEDICINE (OUTPATIENT)
Dept: ENDOCRINOLOGY | Facility: CLINIC | Age: 70
End: 2020-10-21

## 2020-10-21 DIAGNOSIS — I10 ESSENTIAL HYPERTENSION: ICD-10-CM

## 2020-10-21 DIAGNOSIS — E78.2 MIXED HYPERLIPIDEMIA: ICD-10-CM

## 2020-10-21 DIAGNOSIS — E11.42 TYPE 2 DIABETES MELLITUS WITH DIABETIC POLYNEUROPATHY, WITH LONG-TERM CURRENT USE OF INSULIN (HCC): Primary | ICD-10-CM

## 2020-10-21 DIAGNOSIS — F17.200 SMOKING: ICD-10-CM

## 2020-10-21 DIAGNOSIS — Z79.4 TYPE 2 DIABETES MELLITUS WITH DIABETIC POLYNEUROPATHY, WITH LONG-TERM CURRENT USE OF INSULIN (HCC): Primary | ICD-10-CM

## 2020-10-21 LAB — HBA1C MFR BLD: 5.7 %

## 2020-10-21 PROCEDURE — 99443 PR PHYS/QHP TELEPHONE EVALUATION 21-30 MIN: CPT | Performed by: NURSE PRACTITIONER

## 2020-10-21 RX ORDER — PERPHENAZINE 16 MG/1
1 TABLET, FILM COATED ORAL 4 TIMES DAILY
Qty: 200 EACH | Refills: 11 | Status: SHIPPED | OUTPATIENT
Start: 2020-10-21 | End: 2022-05-03

## 2020-10-21 RX ORDER — PERPHENAZINE 16 MG/1
1 TABLET, FILM COATED ORAL 4 TIMES DAILY
Qty: 200 EACH | Refills: 11 | Status: SHIPPED | OUTPATIENT
Start: 2020-10-21 | End: 2020-10-21 | Stop reason: SDUPTHER

## 2020-10-21 NOTE — TELEPHONE ENCOUNTER
Called back and said that they have to get their test strips at [facility] [address] in [place]    Says cannot get the strips at [facility] sorry forgot.    Thank You

## 2020-10-21 NOTE — PROGRESS NOTES
Subjective    Jovan Hardwick is a 70 y.o. male. he is here today for follow-up.    History of Present Illness     You have chosen to receive care through a telephone visit. Do you consent to use a telephone visit for your medical care today? Yes                                                        TELEHEALTH \TELEPHONE VISIT      This a telephone visit due to [facility] current guidelines for social distancing due to the COVID 19 pandemic              Reason - diabetes            Duration/Timing:Diabetes mellitus type 2, age at onset of diabetes: 50 years, onset of symptoms gradual      timing constant       Quality   -- near control       Severity high         complications - occasional hyperglycemia         Severity (Complications/Hospitalizations)  Secondary Macrovascular Complications: No CAD, No CVA, No PAD     Secondary Microvascular Complications:Diabetic nephropathy, proteinuria, diabetic neuropathy, no diabetic retinopathy     Context- routine lab work up         Diabetes Regimen:Insulin through insulin pump              Lab Results   Component Value Date    HGBA1C 5.7 10/05/2020           Blood Glucose Readings       Now on medtronic insulin pump      Unable to download pump          Usually 90 up to 110         denies hypoglycemia      Exercise:Does not execise     Associated Signs/Symptoms  Hyperglycemic Symptoms:none  Hypoglycemic Episodes: early morning hypoglycemia      Aggravating , prednisone for SLE              The following portions of the patient's history were reviewed and updated as appropriate:   Past Medical History:   Diagnosis Date   • Diabetic neuropathy (CMS/HCC)    • Elevated blood pressure    • Hypercholesterolemia    • Lupus erythematosus    • Tobacco user    • Type 2 diabetes mellitus with hyperglycemia (CMS/HCC)    • Type 2 diabetes mellitus, uncontrolled (CMS/HCC)      Past Surgical History:   Procedure Laterality Date   • CARDIAC CATHETERIZATION  03/26/2014    No epicardial coronary  artery disease. Normal LV systolic function. EF 55%. No wall motion abnormalities. Systemic hypertension.     Family History   Problem Relation Age of Onset   • Diabetes Other        Current Outpatient Medications   Medication Sig Dispense Refill   • amiodarone (PACERONE) 200 MG tablet Take 200 mg by mouth 2 (Two) Times a Day.     • aspirin 81 MG chewable tablet Chew 81 mg daily.     • atorvastatin (LIPITOR) 80 MG tablet      • budesonide-formoterol (SYMBICORT) 160-4.5 MCG/ACT inhaler Inhale 2 puffs 2 (two) times a day.     • bumetanide (BUMEX) 2 MG tablet Take 2 mg by mouth 2 (Two) Times a Day.     • carvedilol (COREG) 25 MG tablet Take 25 mg by mouth 2 (two) times a day with meals.     • cetirizine (ZyrTEC) 10 MG tablet Take 10 mg by mouth daily.     • clindamycin (CLEOCIN) 300 MG capsule Take 300 mg by mouth 2 (Two) Times a Day.     • cloNIDine (CATAPRES) 0.2 MG tablet Take 0.2 mg by mouth 2 (two) times a day.     • DULoxetine (CYMBALTA) 30 MG capsule      • esomeprazole (nexIUM) 20 MG capsule Take 20 mg by mouth Daily.     • fenofibrate 160 MG tablet      • furosemide (LASIX) 40 MG tablet Take 40 mg by mouth daily.     • glucose blood (Contour Next Test) test strip 1 each by Other route 4 (Four) Times a Day. use 1 strip to check glucose 4 times daily 200 each 11   • glucose monitor monitoring kit INES CONTOUR NEXT METER.  USE TO TEST 4 TIMES A DAY.  DX-E11.8 1 each 11   • HUMALOG 100 UNIT/ML injection 150 UNITS THROUGH INSULIN PUMP PER  mL 2   • HYDROcodone-acetaminophen (NORCO) 7.5-325 MG per tablet      • insulin detemir (LEVEMIR) 100 UNIT/ML injection 50 units daily 2 each 3   • losartan (COZAAR) 25 MG tablet Take 25 mg by mouth Daily.  3   • metolazone (ZAROXOLYN) 2.5 MG tablet Take 2.5 mg by mouth.     • metOLazone (ZAROXOLYN) 5 MG tablet Take 5 mg by mouth As Needed.     • metoprolol tartrate (LOPRESSOR) 25 MG tablet      • Multiple Vitamins-Minerals (DAILY MULTIVITAMIN PO) Take 1 capsule by mouth  "daily.     • mycophenolate (CELLCEPT) 500 MG tablet Take  by mouth 3 (three) times a day.     • Omega-3 Fatty Acids (FISH OIL) 1000 MG capsule capsule Take 1,000 mg by mouth 2 (two) times a day.     • potassium chloride (K-DUR) 10 MEQ CR tablet      • predniSONE (DELTASONE) 10 MG tablet Take 10 mg by mouth Daily.     • PROAIR  (90 BASE) MCG/ACT inhaler      • SYMBICORT 80-4.5 MCG/ACT inhaler      • tamsulosin (FLOMAX) 0.4 MG capsule 24 hr capsule Take 1 capsule by mouth every night.     • TRUEPLUS INSULIN SYRINGE 31G X 5/16\" 1 ML misc      • vitamin D (ERGOCALCIFEROL) 1.25 MG (23577 UT) capsule capsule Take 1 capsule by mouth Every 7 (Seven) Days. 4 capsule 5   • XARELTO 15 MG tablet Take 15 mg by mouth Daily.     • ZETIA 10 MG tablet      • zolpidem (AMBIEN) 10 MG tablet        No current facility-administered medications for this visit.      No Known Allergies  Social History     Socioeconomic History   • Marital status:      Spouse name: Not on file   • Number of children: Not on file   • Years of education: Not on file   • Highest education level: Not on file   Tobacco Use   • Smoking status: Current Every Day Smoker   • Smokeless tobacco: Never Used   • Tobacco comment: encouraged smoking cessation    Substance and Sexual Activity   • Alcohol use: No       Review of Systems  Review of Systems   Constitutional: Negative for activity change, appetite change, diaphoresis and fatigue.   HENT: Negative for facial swelling, sneezing, sore throat, tinnitus, trouble swallowing and voice change.    Eyes: Negative for photophobia, pain, discharge, redness, itching and visual disturbance.   Respiratory: Negative for apnea, cough, choking, chest tightness and shortness of breath.    Cardiovascular: Negative for chest pain, palpitations and leg swelling.   Gastrointestinal: Negative for abdominal distention, abdominal pain, constipation, diarrhea, nausea and vomiting.   Endocrine: Negative for cold " intolerance, heat intolerance, polydipsia, polyphagia and polyuria.   Genitourinary: Negative for difficulty urinating, dysuria, frequency, hematuria and urgency.   Musculoskeletal: Negative for arthralgias, back pain, gait problem, joint swelling, myalgias, neck pain and neck stiffness.   Skin: Negative for color change, pallor, rash and wound.   Neurological: Negative for dizziness, tremors, weakness, light-headedness, numbness and headaches.   Hematological: Negative for adenopathy. Does not bruise/bleed easily.   Psychiatric/Behavioral: Negative for behavioral problems, confusion and sleep disturbance.        Objective    There were no vitals taken for this visit.  Physical Exam  Constitutional:       Appearance: Normal appearance.   HENT:      Head: Normocephalic and atraumatic.      Right Ear: External ear normal.      Left Ear: External ear normal.      Nose: Nose normal.   Eyes:      Extraocular Movements: Extraocular movements intact.      Conjunctiva/sclera: Conjunctivae normal.      Pupils: Pupils are equal, round, and reactive to light.   Pulmonary:      Effort: Pulmonary effort is normal. No respiratory distress.   Skin:     Coloration: Skin is not pale.   Neurological:      General: No focal deficit present.      Mental Status: He is alert and oriented to person, place, and time.   Psychiatric:         Mood and Affect: Mood normal.         Behavior: Behavior normal.         Thought Content: Thought content normal.         Judgment: Judgment normal.         Lab Review  Glucose   Date Value   08/29/2018 93 mg/dL   02/02/2018 104 mg/dL (H)   07/11/2016 148 mg/dl (H)   03/26/2014 101 mg/dl (H)   03/25/2014 135 mg/dl (H)     Sodium (mmol/L)   Date Value   08/29/2018 140   02/02/2018 142   07/11/2016 139   03/26/2014 138   03/25/2014 138     Potassium (mmol/L)   Date Value   08/29/2018 4.4   02/02/2018 4.7   07/11/2016 3.8   03/26/2014 3.9   03/25/2014 3.6     Chloride (mmol/L)   Date Value   08/29/2018 101    02/02/2018 103   07/11/2016 97   03/26/2014 101   03/25/2014 97     CO2 (mmol/L)   Date Value   08/29/2018 31.0   02/02/2018 28.0   07/11/2016 35 (H)   03/26/2014 31   03/25/2014 32 (H)     BUN   Date Value   08/29/2018 31 mg/dL (H)   02/02/2018 53 mg/dL (H)   07/11/2016 43 mg/dl (H)   03/26/2014 22 mg/dl (H)   03/25/2014 25 mg/dl (H)     Creatinine   Date Value   08/29/2018 1.75 mg/dL (H)   02/02/2018 2.00 mg/dL (H)   07/11/2016 1.5 mg/dl (H)   03/26/2014 1.4 mg/dl (H)   03/25/2014 1.5 mg/dl (H)     Hemoglobin A1C   Date Value   10/05/2020 5.7   01/17/2020 5.9 %   07/24/2019 6.0 %   08/29/2018 6.1 % (H)   01/03/2018 7.7 %   01/03/2018 7.7   07/11/2016 7.4 %TotHgb (H)     Triglycerides (mg/dl)   Date Value   07/11/2016 357 (H)   03/26/2014 371 (H)     LDL Cholesterol  (mg/dl)   Date Value   07/11/2016 62   03/26/2014 41       Assessment/Plan      1. Type 2 diabetes mellitus with diabetic polyneuropathy, with long-term current use of insulin (CMS/[facility])    2. Smoking    3. Essential hypertension    4. Mixed hyperlipidemia    .    Medications prescribed:  Outpatient Encounter Medications as of 10/21/2020   Medication Sig Dispense Refill   • amiodarone (PACERONE) 200 MG tablet Take 200 mg by mouth 2 (Two) Times a Day.     • aspirin 81 MG chewable tablet Chew 81 mg daily.     • atorvastatin (LIPITOR) 80 MG tablet      • budesonide-formoterol (SYMBICORT) 160-4.5 MCG/ACT inhaler Inhale 2 puffs 2 (two) times a day.     • bumetanide (BUMEX) 2 MG tablet Take 2 mg by mouth 2 (Two) Times a Day.     • carvedilol (COREG) 25 MG tablet Take 25 mg by mouth 2 (two) times a day with meals.     • cetirizine (ZyrTEC) 10 MG tablet Take 10 mg by mouth daily.     • clindamycin (CLEOCIN) 300 MG capsule Take 300 mg by mouth 2 (Two) Times a Day.     • cloNIDine (CATAPRES) 0.2 MG tablet Take 0.2 mg by mouth 2 (two) times a day.     • DULoxetine (CYMBALTA) 30 MG capsule      • esomeprazole (nexIUM) 20 MG capsule Take 20 mg by mouth Daily.    "  • fenofibrate 160 MG tablet      • furosemide (LASIX) 40 MG tablet Take 40 mg by mouth daily.     • glucose monitor monitoring kit INES CONTOUR NEXT METER.  USE TO TEST 4 TIMES A DAY.  DX-E11.8 1 each 11   • HUMALOG 100 UNIT/ML injection 150 UNITS THROUGH INSULIN PUMP PER  mL 2   • HYDROcodone-acetaminophen (NORCO) 7.5-325 MG per tablet      • insulin detemir (LEVEMIR) 100 UNIT/ML injection 50 units daily 2 each 3   • losartan (COZAAR) 25 MG tablet Take 25 mg by mouth Daily.  3   • metolazone (ZAROXOLYN) 2.5 MG tablet Take 2.5 mg by mouth.     • metOLazone (ZAROXOLYN) 5 MG tablet Take 5 mg by mouth As Needed.     • metoprolol tartrate (LOPRESSOR) 25 MG tablet      • Multiple Vitamins-Minerals (DAILY MULTIVITAMIN PO) Take 1 capsule by mouth daily.     • mycophenolate (CELLCEPT) 500 MG tablet Take  by mouth 3 (three) times a day.     • Omega-3 Fatty Acids (FISH OIL) 1000 MG capsule capsule Take 1,000 mg by mouth 2 (two) times a day.     • potassium chloride (K-DUR) 10 MEQ CR tablet      • predniSONE (DELTASONE) 10 MG tablet Take 10 mg by mouth Daily.     • PROAIR  (90 BASE) MCG/ACT inhaler      • SYMBICORT 80-4.5 MCG/ACT inhaler      • tamsulosin (FLOMAX) 0.4 MG capsule 24 hr capsule Take 1 capsule by mouth every night.     • TRUEPLUS INSULIN SYRINGE 31G X 5/16\" 1 ML misc      • vitamin D (ERGOCALCIFEROL) 1.25 MG (11010 UT) capsule capsule Take 1 capsule by mouth Every 7 (Seven) Days. 4 capsule 5   • XARELTO 15 MG tablet Take 15 mg by mouth Daily.     • ZETIA 10 MG tablet      • zolpidem (AMBIEN) 10 MG tablet      • [DISCONTINUED] CONTOUR NEXT TEST test strip USE 1 STRIP TO CHECK GLUCOSE 4 TIMES DAILY 200 each 0   • [DISCONTINUED] glucose blood (Contour Next Test) test strip 1 each by Other route 4 (Four) Times a Day. use 1 strip to check glucose 4 times daily 200 each 11     No facility-administered encounter medications on file as of 10/21/2020.        Orders placed during this encounter " include:  Orders Placed This Encounter   Procedures   • Hemoglobin A1c     This order was created through External Result Entry     Glycemic Management           Lab Results   Component Value Date    HGBA1C 5.7 10/05/2020          Medtronic insulin pump            BASAL -     MN -2.0  8 am - 2.0            Carb ratio      4.5        Sensitivity         20        Target BG       120 to 140     No changes today           Previous regimen          Levemir 40 units and 45 units pm          Humalog  1 unit per every 5 grams of CHO      Lipid Management                Lab Results   Component Value Date     LDL 62 07/11/2016             Oct. 2020     LDL - 88         Simvastatin  80 mg qhs    triglice  160 mg daily   zetia 10 mg daily     Blood Pressure Management:t            follows with nephrology       On metolazone 2.5 mg three times weekly      On lasix 40 mg daily                     Microvascular Complication Monitoring      CKD stage III, nephrotic sx         Neuropathy      Lyrica -- caused side effects      Gabapentin - caused side effects      On  norco for pain -- prescribed by primary         No cuts or sores on feet       last eye exam -- Feb. 2020 , no DR         No results found for: SHERIN, [id number]     Immunizations: Last pneumococcal immunization 2018           Preventive Care:patient is not smoking      Jovan Hardwick  reports that he has been smoking. He has never used smokeless tobacco.. I have educated him on the risk of diseases from using tobacco products such as cancer, COPD and heart disease.     I advised him to quit and he is not willing to quit.    I spent 3  minutes counseling the patient.                     Weight Related:Obesity , counseled nutrition, and physical activity          There is no height or weight on file to calculate BMI.              Other Diabetes Related Aspects            Lab Results   Component Value Date     TSH 2.300 08/29/2018                  Lupus      Taking  prednisone 5 mg daily              We spent 25 minutes including reviewing the patients electronic chart, reviewing medications, reviewing labs, active problems     I provided advice regarding diabetes management, dietary changes, adjustments of medication, self titration of insulin, refilled medications, ordering labs, future appointments     Patient was advised to contact the office if there are unanswered questions, trouble with blood glucose management, or any concerns               4. Follow-up: No follow-ups on file.

## 2021-01-21 ENCOUNTER — TELEMEDICINE (OUTPATIENT)
Dept: ENDOCRINOLOGY | Facility: CLINIC | Age: 71
End: 2021-01-21

## 2021-01-21 DIAGNOSIS — E11.42 DIABETIC POLYNEUROPATHY ASSOCIATED WITH TYPE 2 DIABETES MELLITUS (HCC): ICD-10-CM

## 2021-01-21 DIAGNOSIS — E11.42 TYPE 2 DIABETES MELLITUS WITH DIABETIC POLYNEUROPATHY, WITH LONG-TERM CURRENT USE OF INSULIN (HCC): Primary | ICD-10-CM

## 2021-01-21 DIAGNOSIS — Z79.4 TYPE 2 DIABETES MELLITUS WITH DIABETIC POLYNEUROPATHY, WITH LONG-TERM CURRENT USE OF INSULIN (HCC): Primary | ICD-10-CM

## 2021-01-21 DIAGNOSIS — E78.2 MIXED HYPERLIPIDEMIA: ICD-10-CM

## 2021-01-21 PROCEDURE — 99214 OFFICE O/P EST MOD 30 MIN: CPT | Performed by: NURSE PRACTITIONER

## 2021-01-21 NOTE — PROGRESS NOTES
Chief Complaint  Diabetes    Subjective          Jovan Hardwick presents to [facility] for   History of Present Illness        You have chosen to receive care through a telehealth visit.  Do you consent to use a video/audio connection for your medical care today? Yes          TELEHEALTH VIDEO VISIT     This a video visit due to [facility] current guidelines for social distancing due to the COVID 19 pandemic        70-year-old male presents for follow-up    Reason diabetes mellitus type 2    Duration diagnosed at the age of 50    Onset sudden    Timing constant    Quality very well controlled        Severity is high due to complications    Macrovascular complications none    Microvascular complications include diabetic neuropathy, CKD, proteinuria       Context- routine lab work up         Diabetes Regimen:Insulin through insulin pump                     Lab Results   Component Value Date     HGBA1C 5.7 10/05/2020            Blood Glucose Readings       Now on medtronic insulin pump      Unable to download pump        States blood glucoses are are at goal he generally stays between 100-1 30 occasionally high after eating high carb denies any low blood glucose levels          Associated Signs/Symptoms  Hyperglycemic Symptoms:none  Hypoglycemic Episodes: Documented in the past      Aggravating , prednisone for SLE            Objective   Vital Signs:   There were no vitals taken for this visit.    Physical Exam  Constitutional:       Appearance: Normal appearance.   HENT:      Head: Normocephalic.      Right Ear: External ear normal.      Left Ear: External ear normal.   Eyes:      Conjunctiva/sclera: Conjunctivae normal.      Pupils: Pupils are equal, round, and reactive to light.   Neck:      Musculoskeletal: Normal range of motion.   Musculoskeletal: Normal range of motion.   Skin:     Coloration: Skin is not pale.   Neurological:      General: No focal deficit present.      Mental Status: He  is alert.   Psychiatric:         Mood and Affect: Mood normal.         Thought Content: Thought content normal.         Judgment: Judgment normal.        Result Review :   The following data was reviewed by: KAHLIL Ovalles on 01/21/2021:                  A1C Last 3 Results    HGBA1C Last 3 Results 10/5/20   Hemoglobin A1C 5.7                         Assessment and Plan    Problem List Items Addressed This Visit        Other    Type 2 diabetes mellitus with diabetic polyneuropathy, with long-term current use of insulin (CMS/[facility]) - Primary    Mixed hyperlipidemia    Diabetic polyneuropathy associated with type 2 diabetes mellitus (CMS/[facility])      Glycemic Management                 Lab Results   Component Value Date     HGBA1C 5.7 10/05/2020            Medtronic insulin pump            BASAL -     MN -2.0  8 am - 2.0            Carb ratio      4.5        Sensitivity         20        Target BG       120 to 140     No changes in the insulin pump settings    Instructed to call if he has hypo or hyperglycemia        Previous regimen          Levemir 40 units and 45 units pm          Humalog  1 unit per every 5 grams of CHO      Lipid Management            hyperlipidemia    Currently controlled    Taking simvastatin 80 mg at night    TriCor 160 mg at night    Zetia 10 mg daily          Blood Pressure Management:t      Hypertension      follows with nephrology       Taking metolazone 2.5 mg three times weekly      Taking  lasix 40 mg daily                     Microvascular Complication Monitoring      CKD stage III, nephrotic sx     Follows with nephrology        Neuropathy      Lyrica -- caused side effects      Gabapentin - caused side effects      On  norco for pain -- prescribed by primary         No cuts or sores on feet       last eye exam -- July . 2020 , no         Immunizations: Last pneumococcal immunization 2020           Preventive Care:patient is not smoking                             Weight  Related:      Obesity , counseled nutrition, and physical activity          There is no height or weight on file to calculate BMI.              Other Diabetes Related Aspects                Lab Results   Component Value Date     TSH 2.300 08/29/2018                  Lupus      Taking prednisone 5 mg daily         He is going to do labs next week with his primary care provider    Ask him to have them send us a copy          Follow Up   Return in about 3 months (around 4/21/2021) for Recheck.  Patient was given instructions and counseling regarding his condition or for health maintenance advice. Please see specific information pulled into the AVS if appropriate.     We spent 25 minutes including reviewing the patients electronic chart, reviewing medications, reviewing labs, active problems    I provided advice regarding diabetes management, dietary changes, adjustments of medication, self titration of insulin, refilled medications, ordering labs, future appointments    Patient was advised to contact the office if there are unanswered questions, trouble with blood glucose management, or any concerns

## 2021-04-06 ENCOUNTER — DOCUMENTATION (OUTPATIENT)
Dept: ENDOCRINOLOGY | Facility: CLINIC | Age: 71
End: 2021-04-06

## 2021-04-21 ENCOUNTER — TELEMEDICINE (OUTPATIENT)
Dept: ENDOCRINOLOGY | Facility: CLINIC | Age: 71
End: 2021-04-21

## 2021-04-21 DIAGNOSIS — E11.42 DIABETIC POLYNEUROPATHY ASSOCIATED WITH TYPE 2 DIABETES MELLITUS (HCC): ICD-10-CM

## 2021-04-21 DIAGNOSIS — E78.2 MIXED HYPERLIPIDEMIA: ICD-10-CM

## 2021-04-21 DIAGNOSIS — Z79.4 TYPE 2 DIABETES MELLITUS WITH CHRONIC KIDNEY DISEASE, WITH LONG-TERM CURRENT USE OF INSULIN, UNSPECIFIED CKD STAGE (HCC): Primary | ICD-10-CM

## 2021-04-21 DIAGNOSIS — I10 ESSENTIAL HYPERTENSION: ICD-10-CM

## 2021-04-21 DIAGNOSIS — E11.22 TYPE 2 DIABETES MELLITUS WITH CHRONIC KIDNEY DISEASE, WITH LONG-TERM CURRENT USE OF INSULIN, UNSPECIFIED CKD STAGE (HCC): Primary | ICD-10-CM

## 2021-04-21 PROCEDURE — 99214 OFFICE O/P EST MOD 30 MIN: CPT | Performed by: NURSE PRACTITIONER

## 2021-04-21 NOTE — PROGRESS NOTES
Chief Complaint  Diabetes    Subjective          Jovan Hardwick presents to [facility] ENDOCRINOLOGY  History of Present Illness       You have chosen to receive care through a telehealth visit.  Do you consent to use a video/audio connection for your medical care today? Yes            TELEHEALTH VIDEO VISIT     This a video visit due to [facility] current guidelines for social distancing due to the COVID 19 pandemic    70 year old male presents for follow up     Reason diabetes mellitus type 2     Timing constant     Quality near control     Duration diagnosed at age 50     Severity high     Macrovascular complications none    Microvascular complications -- neuropathy, CKD , proteinuria                    Context- routine lab work up         Diabetes Regimen:Insulin through insulin pump                         Lab Results   Component Value Date     HGBA1C 5.7 10/05/2020            Blood Glucose Readings     Checks 4 times daily       Now on medtronic insulin pump      Unable to download         States at goal    Denies any lows         Associated Signs/Symptoms  Hyperglycemic Symptoms:none  Hypoglycemic Episodes: Documented in the past      Aggravating , prednisone for SLE         Review of Systems - General ROS: positive for  - fatigue    Objective   Vital Signs:   There were no vitals taken for this visit.    Physical Exam  Neurological:      General: No focal deficit present.      Mental Status: He is alert.   Psychiatric:         Mood and Affect: Mood normal.         Thought Content: Thought content normal.         Judgment: Judgment normal.        Result Review :   The following data was reviewed by: KAHLIL Ovalles on 04/21/2021:  Common labs    Common Labsle 10/5/20 2/3/21   Hemoglobin A1C 5.7    PSA  17.00      Comments are available for some flowsheets but are not being displayed.                     Assessment and Plan    Diagnoses and all orders for this visit:    1. Type 2 diabetes  mellitus with chronic kidney disease, with long-term current use of insulin, unspecified CKD stage (CMS/[facility]) (Primary)    2. Mixed hyperlipidemia    3. Essential hypertension    4. Diabetic polyneuropathy associated with type 2 diabetes mellitus (CMS/[facility])           Glycemic Management           Lab Results   Component Value Date    HGBA1C 5.7 10/05/2020          Medtronic insulin pump            BASAL -     MN -2.0  8 am - 2.0            Carb ratio      4.5        Sensitivity         20        Target BG       120 to 140        Approve for [employer] Personal Use or dexcom         #1  Patient has diabetes mellitus, insulin-dependent.     #2 he performs blood glucose testing 4 times daily and blood glucose log was brought to office with variability from .     #3  hhe is requiring  Basal insulin  and Prandial Insulin 3 times daily for a total of 4 injections per day.     #4 Based on blood glucose readings we are making adjustments.      #5 I have personally seen patient within the past 6 months     #6 We plan on seeing him every 2-3 months for continuous adjustment of her diabetes regimen      #7 patient has hypoglycemia with unawareness.     #8 patient has day-to-day variation in her mealtime which confounds the degree of insulin dosing with multiple daily injections.     #9 patient has completed diabetes education program with us.     #10 he has demonstrated the ability to self monitor her glucose.         #11 Patient is motivated in improving  diabetes control         Previous regimen          Levemir 40 units and 45 units pm          Humalog  1 unit per every 5 grams of CHO      Lipid Management             hyperlipidemia          Taking simvastatin 80 mg at night     Taking TriCor 160 mg at night     Taking Zetia 10 mg daily           Blood Pressure Management:t      Hypertension      follows with nephrology       Taking metolazone 2.5 mg three times weekly      Taking  lasix 40 mg daily                      Microvascular Complication Monitoring      CKD stage III, nephrotic sx      Follows with nephrology        Neuropathy      Lyrica -- caused side effects      Gabapentin - caused side effects      On  norco for pain -- prescribed by primary         No cuts or sores on feet       last eye exam -- July . 2020 , no          Immunizations: Last pneumococcal immunization 2020           Preventive Care:patient is not smoking                              Weight Related:        Obesity , counseled nutrition, and physical activity                       Other Diabetes Related Aspects      Lab Results   Component Value Date    TSH 2.300 08/29/2018                Lupus      Taking prednisone 5 mg daily                     Follow Up   Return in about 3 months (around 7/21/2021) for Recheck.  Patient was given instructions and counseling regarding his condition or for health maintenance advice. Please see specific information pulled into the AVS if appropriate.         I provided advice regarding diabetes management, dietary changes, adjustments of medication, self titration of insulin, refilled medications, ordering labs, future appointments    Patient was advised to contact the office if there are unanswered questions, trouble with blood glucose management, or any concerns

## 2021-04-22 ENCOUNTER — DOCUMENTATION (OUTPATIENT)
Dept: ENDOCRINOLOGY | Facility: CLINIC | Age: 71
End: 2021-04-22

## 2021-05-04 ENCOUNTER — DOCUMENTATION (OUTPATIENT)
Dept: ENDOCRINOLOGY | Facility: CLINIC | Age: 71
End: 2021-05-04

## 2021-05-13 DIAGNOSIS — IMO0002 UNCONTROLLED TYPE 2 DIABETES MELLITUS WITH COMPLICATION, WITH LONG-TERM CURRENT USE OF INSULIN: ICD-10-CM

## 2021-06-01 ENCOUNTER — DOCUMENTATION (OUTPATIENT)
Dept: ENDOCRINOLOGY | Facility: CLINIC | Age: 71
End: 2021-06-01

## 2021-07-22 ENCOUNTER — TELEMEDICINE (OUTPATIENT)
Dept: ENDOCRINOLOGY | Facility: CLINIC | Age: 71
End: 2021-07-22

## 2021-07-22 DIAGNOSIS — Z79.4 TYPE 2 DIABETES MELLITUS WITH HYPERGLYCEMIA, WITH LONG-TERM CURRENT USE OF INSULIN (HCC): Primary | ICD-10-CM

## 2021-07-22 DIAGNOSIS — E11.65 TYPE 2 DIABETES MELLITUS WITH HYPERGLYCEMIA, WITH LONG-TERM CURRENT USE OF INSULIN (HCC): Primary | ICD-10-CM

## 2021-07-22 DIAGNOSIS — E78.2 MIXED HYPERLIPIDEMIA: ICD-10-CM

## 2021-07-22 DIAGNOSIS — I10 ESSENTIAL HYPERTENSION: ICD-10-CM

## 2021-07-22 DIAGNOSIS — E11.42 DIABETIC POLYNEUROPATHY ASSOCIATED WITH TYPE 2 DIABETES MELLITUS (HCC): ICD-10-CM

## 2021-07-22 LAB — HBA1C MFR BLD: 5.8 %

## 2021-07-22 PROCEDURE — 99214 OFFICE O/P EST MOD 30 MIN: CPT | Performed by: NURSE PRACTITIONER

## 2021-07-22 NOTE — PROGRESS NOTES
Chief Complaint  Diabetes    Subjective          Jovan Hardwick presents to [facility] ENDOCRINOLOGY  History of Present Illness     You have chosen to receive care through a telehealth visit.  Do you consent to use a video/audio connection for your medical care today? Yes          TELEHEALTH VIDEO VISIT     This a video visit due to [facility] current guidelines for social distancing due to the COVID 19 pandemic      70 year old male presents for follow up     Reason diabetes mellitus typ 2    Duration diagnosed at age 50     Timing constant     Quality great control           Severity high      Macrovascular complications none     Microvascular complications -- neuropathy, CKD , proteinuria                     Context- routine lab work up         Diabetes Regimen:Insulin through insulin pump            Lab Results   Component Value Date    HGBA1C 5.8 07/13/2021            Blood Glucose Readings      Checks 4 times daily           Patient checks blood glucose levels 4 times daily and has been for the last 90 days    Jo personal system he uses               Now on [employer] insulin pump      Unable to download        States at goal denies         Associated Signs/Symptoms  Hyperglycemic Symptoms:none  Hypoglycemic Episodes: Documented in the past      Aggravating , prednisone for SLE               Objective   Vital Signs:   There were no vitals taken for this visit.    Physical Exam  Neurological:      General: No focal deficit present.      Mental Status: He is alert.   Psychiatric:         Mood and Affect: Mood normal.         Thought Content: Thought content normal.         Judgment: Judgment normal.        Result Review :   The following data was reviewed by: KAHLIL Ovalles on 07/22/2021:  Most Recent A1C    HGBA1C Most Recent 7/13/21   Hemoglobin A1C 5.8                     Assessment and Plan    Diagnoses and all orders for this visit:    1. Type 2 diabetes mellitus with  hyperglycemia, with long-term current use of insulin (CMS/[facility]) (Primary)    2. Mixed hyperlipidemia    3. Essential hypertension    4. Diabetic polyneuropathy associated with type 2 diabetes mellitus (CMS/[facility])           Glycemic Management        Lab Results   Component Value Date    HGBA1C 5.8 07/13/2021             Medtronic insulin pump            BASAL -     MN -2.0  8 am - 2.0            Carb ratio      4.5        Sensitivity         20        Target BG       120 to 140        Using [employer] personal system but could not download           Jo  has allowed the patient to minimize hypoglycemia as alarms have predicted and avoided them    he also uses the arrows on the dexcom as follows:    If arrow is horizontal , he uses the predicted bolus    If the arrow is slanted up or down-- he increase or decrease by 0.5 units    If the arrow is vertical up or down - he increases or decreases by 1 unit        Previous regimen          Levemir 40 units and 45 units pm          Humalog  1 unit per every 5 grams of CHO      Lipid Management             hyperlipidemia           Taking simvastatin 80 mg at night     Taking TriCor 160 mg at night     Taking Zetia 10 mg daily    July 2021    LDL - 79           Blood Pressure Management:t      Hypertension      follows with nephrology       Taking metolazone 2.5 mg three times weekly      Taking  lasix 40 mg daily                     Microvascular Complication Monitoring      CKD stage III, nephrotic sx      Follows with nephrology        Neuropathy      Lyrica -- caused side effects      Gabapentin - caused side effects      On  norco for pain -- prescribed by primary         No cuts or sores on feet       last eye exam -- July . 2020 , no          Immunizations: Last pneumococcal immunization 2020           Preventive Care:patient is not smoking                              Weight Related:        Obesity , counseled nutrition, and physical activity                        Other  Diabetes Related Aspects            Lab Results   Component Value Date     TSH 2.300 08/29/2018 July 2021    TSH - 3.96        Lupus      Taking prednisone 5 mg daily                               Follow Up   No follow-ups on file.  Patient was given instructions and counseling regarding his condition or for health maintenance advice. Please see specific information pulled into the AVS if appropriate.         I provided advice regarding diabetes management, dietary changes, adjustments of medication, self titration of insulin, refilled medications, ordering labs, future appointments    Patient was advised to contact the office if there are unanswered questions, trouble with blood glucose management, or any concerns

## 2021-10-25 ENCOUNTER — OFFICE VISIT (OUTPATIENT)
Dept: ENDOCRINOLOGY | Facility: CLINIC | Age: 71
End: 2021-10-25

## 2021-10-25 VITALS
OXYGEN SATURATION: 97 % | HEIGHT: 73 IN | SYSTOLIC BLOOD PRESSURE: 136 MMHG | DIASTOLIC BLOOD PRESSURE: 62 MMHG | BODY MASS INDEX: 33.98 KG/M2 | WEIGHT: 256.4 LBS | HEART RATE: 52 BPM

## 2021-10-25 DIAGNOSIS — E78.2 MIXED HYPERLIPIDEMIA: ICD-10-CM

## 2021-10-25 DIAGNOSIS — Z79.4 TYPE 2 DIABETES MELLITUS WITH HYPOGLYCEMIA WITHOUT COMA, WITH LONG-TERM CURRENT USE OF INSULIN (HCC): Primary | ICD-10-CM

## 2021-10-25 DIAGNOSIS — I10 ESSENTIAL HYPERTENSION: ICD-10-CM

## 2021-10-25 DIAGNOSIS — E11.649 TYPE 2 DIABETES MELLITUS WITH HYPOGLYCEMIA WITHOUT COMA, WITH LONG-TERM CURRENT USE OF INSULIN (HCC): Primary | ICD-10-CM

## 2021-10-25 DIAGNOSIS — E11.42 DIABETIC POLYNEUROPATHY ASSOCIATED WITH TYPE 2 DIABETES MELLITUS (HCC): ICD-10-CM

## 2021-10-25 LAB
EXPIRATION DATE: ABNORMAL
HBA1C MFR BLD: 5.5 %
Lab: 844

## 2021-10-25 PROCEDURE — 83036 HEMOGLOBIN GLYCOSYLATED A1C: CPT | Performed by: NURSE PRACTITIONER

## 2021-10-25 PROCEDURE — 99214 OFFICE O/P EST MOD 30 MIN: CPT | Performed by: NURSE PRACTITIONER

## 2021-10-25 PROCEDURE — 3044F HG A1C LEVEL LT 7.0%: CPT | Performed by: NURSE PRACTITIONER

## 2021-10-25 PROCEDURE — 95251 CONT GLUC MNTR ANALYSIS I&R: CPT | Performed by: NURSE PRACTITIONER

## 2021-10-25 NOTE — PROGRESS NOTES
"Chief Complaint  Diabetes    Subjective          Jovan Hardwick presents to [facility] ENDOCRINOLOGY  History of Present Illness     In office visit       71 year old male presents for follow up     Reason diabetes mellitus type 2    Timing constant     Quality not controlled due to hypoglycemia     Lab Results   Component Value Date    HGBA1C 5.5 10/25/2021         Duration diagnosed at age 50       Severity high         He had been on radiation therapy and hormone therapy for prostate cancer            Macrovascular complications none     Microvascular complications -- neuropathy, CKD , proteinuria                     Context- routine lab work up         Diabetes Regimen:Insulin through insulin pump                  Lab Results   Component Value Date     HGBA1C 5.8 07/13/2021               Blood Glucose Readings      Checks 4 times daily               Patient checks blood glucose levels 4 times daily and has been for the last 90 days     [employer] personal system he uses         See below for download         Now on [employer] insulin pump           Associated Signs/Symptoms  Hyperglycemic Symptoms:none  Hypoglycemic Episodes: Documented in the past      Aggravating , prednisone for SLE                       Objective   Vital Signs:   /62   Pulse 52   Ht 185.4 cm (73\")   Wt 116 kg (256 lb 6.4 oz)   SpO2 97%   BMI 33.83 kg/m²     Physical Exam  Constitutional:       Appearance: Normal appearance.   Cardiovascular:      Rate and Rhythm: Regular rhythm.      Heart sounds: Normal heart sounds.   Pulmonary:      Breath sounds: Normal breath sounds.   Musculoskeletal:      Cervical back: Normal range of motion.   Neurological:      Mental Status: He is alert.        Result Review :   The following data was reviewed by: KAHLIL Ovalles on 10/25/2021:  Common labs    Common Labsle 7/13/21 10/19/21 10/25/21   Hemoglobin A1C 5.8  5.5   PSA  0.25       Comments are available for " some flowsheets but are not being displayed.                     Assessment and Plan    Diagnoses and all orders for this visit:    1. Type 2 diabetes mellitus with hypoglycemia without coma, with long-term current use of insulin (HCC) (Primary)  -     POC Glycosylated Hemoglobin (Hb A1C)    2. Essential hypertension    3. Mixed hyperlipidemia    4. Diabetic polyneuropathy associated with type 2 diabetes mellitus (HCC)           Glycemic Management    Diabetes mellitus type 2         Lab Results   Component Value Date    HGBA1C 5.5 10/25/2021          [employer] personal use    The date is printing incorrectly on his download but it is dated from October 12 to October 25, 2021    Average glucose was 97    Time in target is 95%    Low 5%    Very low 0%    He is having lows overnight and 1 missing meals    He is not bolusing so we know this is a basal issue    I will decrease his basal rates    He is to contact me if he still having hypoglycemia    Medtronic insulin pump            BASAL -     MN -2.0--- change to 1.9   8 am - 2.0 --change to 1.9                Carb ratio      4.5        Sensitivity         20        Target BG       120 to 140        Using [employer] personal system but could not download               [employer]  has allowed the patient to minimize hypoglycemia as alarms have predicted and avoided them     he also uses the arrows on the dexcom as follows:     If arrow is horizontal , he uses the predicted bolus     If the arrow is slanted up or down-- he increase or decrease by 0.5 units     If the arrow is vertical up or down - he increases or decreases by 1 unit         Previous regimen          Levemir 40 units and 45 units pm          Humalog  1 unit per every 5 grams of CHO      Lipid Management             hyperlipidemia           Taking simvastatin 80 mg at night     Taking TriCor 160 mg at night     Taking Zetia 10 mg daily     July 2021     LDL - 79           Blood Pressure  Management:t      Hypertension      follows with nephrology       Taking metolazone 2.5 mg three times weekly      Taking  lasix 40 mg daily                     Microvascular Complication Monitoring      CKD stage III, nephrotic sx      Follows with nephrology        Neuropathy      Lyrica -- caused side effects      Gabapentin - caused side effects      On  norco for pain -- prescribed by primary         No cuts or sores on feet       last eye exam -- July . 2021 , no DR         Immunizations: Last pneumococcal immunization 2020                               Weight Related:        Obesity      Patient's Body mass index is 33.83 kg/m². indicating that he is obese (BMI >30). Obesity-related health conditions include the following: diabetes mellitus. Obesity is unchanged. BMI is is above average; no BMI management plan is appropriate. We discussed portion control and increasing exercise..                      Other Diabetes Related Aspects                Lab Results   Component Value Date     TSH 2.300 08/29/2018 July 2021     TSH - 3.96        Lupus      Taking prednisone 5 mg daily                      Follow Up   Return in about 3 months (around 1/25/2022) for Recheck.  Patient was given instructions and counseling regarding his condition or for health maintenance advice. Please see specific information pulled into the AVS if appropriate.         This document has been electronically signed by KAHLIL Ovalles on October 25, 2021 15:20 CDT.

## 2022-01-26 ENCOUNTER — OFFICE VISIT (OUTPATIENT)
Dept: ENDOCRINOLOGY | Facility: CLINIC | Age: 72
End: 2022-01-26

## 2022-01-26 VITALS
HEART RATE: 84 BPM | WEIGHT: 260.8 LBS | SYSTOLIC BLOOD PRESSURE: 148 MMHG | HEIGHT: 73 IN | OXYGEN SATURATION: 98 % | DIASTOLIC BLOOD PRESSURE: 70 MMHG | BODY MASS INDEX: 34.57 KG/M2

## 2022-01-26 DIAGNOSIS — I10 ESSENTIAL HYPERTENSION: ICD-10-CM

## 2022-01-26 DIAGNOSIS — Z79.4 TYPE 2 DIABETES MELLITUS WITH HYPOGLYCEMIA WITHOUT COMA, WITH LONG-TERM CURRENT USE OF INSULIN: ICD-10-CM

## 2022-01-26 DIAGNOSIS — Z79.4 TYPE 2 DIABETES MELLITUS WITH HYPOGLYCEMIA WITHOUT COMA, WITH LONG-TERM CURRENT USE OF INSULIN: Primary | ICD-10-CM

## 2022-01-26 DIAGNOSIS — E11.649 TYPE 2 DIABETES MELLITUS WITH HYPOGLYCEMIA WITHOUT COMA, WITH LONG-TERM CURRENT USE OF INSULIN: Primary | ICD-10-CM

## 2022-01-26 DIAGNOSIS — E11.649 TYPE 2 DIABETES MELLITUS WITH HYPOGLYCEMIA WITHOUT COMA, WITH LONG-TERM CURRENT USE OF INSULIN: ICD-10-CM

## 2022-01-26 DIAGNOSIS — E78.2 MIXED HYPERLIPIDEMIA: Primary | ICD-10-CM

## 2022-01-26 PROCEDURE — 95251 CONT GLUC MNTR ANALYSIS I&R: CPT | Performed by: NURSE PRACTITIONER

## 2022-01-26 PROCEDURE — 99214 OFFICE O/P EST MOD 30 MIN: CPT | Performed by: NURSE PRACTITIONER

## 2022-01-26 NOTE — PROGRESS NOTES
"Chief Complaint  Diabetes    Subjective          Jovan Hardwick presents to [facility] ENDOCRINOLOGY  History of Present Illness       In office visit           71 year old male presents for follow up      Reason diabetes mellitus type 2     Timing constant      Quality not controlled due to hypoglycemia            Lab Results   Component Value Date     HGBA1C 5.5 10/25/2021            Duration diagnosed at age 50         Severity high            He had been on radiation therapy and hormone therapy for prostate cancer               Macrovascular complications none     Microvascular complications -- neuropathy, CKD , proteinuria                     Context- routine lab work up         Diabetes Regimen:Insulin through insulin pump                      Lab Results   Component Value Date     HGBA1C 5.8 07/13/2021               Blood Glucose Readings      Checks 4 times daily               Patient checks blood glucose levels 4 times daily and has been for the last 90 days     [employer] personal system he uses         See below for download         Now on medtronic insulin pump                Aggravating , prednisone for SLE                 Review of Systems - General ROS: negative          Objective   Vital Signs:   /70   Pulse 84   Ht 185.4 cm (73\")   Wt 118 kg (260 lb 12.8 oz)   SpO2 98%   BMI 34.41 kg/m²     Physical Exam  Constitutional:       Appearance: Normal appearance.   Cardiovascular:      Rate and Rhythm: Regular rhythm.      Heart sounds: Normal heart sounds.   Pulmonary:      Breath sounds: Normal breath sounds.   Musculoskeletal:      Cervical back: Normal range of motion.   Neurological:      General: No focal deficit present.      Mental Status: He is alert.   Psychiatric:         Mood and Affect: Mood normal.         Thought Content: Thought content normal.         Judgment: Judgment normal.        Result Review :   The following data was reviewed by: [facility] " KAHLIL Hall on 01/26/2022:  Common labs    Common Labsle 7/21/21 10/19/21 10/25/21   Glucose 132 (A)     BUN 29 (A)     Creatinine 1.75 (A)     Sodium 143     Potassium 4.0     Chloride 102     Calcium 9.1     Hemoglobin A1C   5.5   PSA  0.25    (A) Abnormal value       Comments are available for some flowsheets but are not being displayed.                     Assessment and Plan    Diagnoses and all orders for this visit:    1. Mixed hyperlipidemia (Primary)    2. Type 2 diabetes mellitus with hypoglycemia without coma, with long-term current use of insulin (HCC)    3. Essential hypertension         Glycemic Management     Diabetes mellitus type 2               Lab Results   Component Value Date     HGBA1C 5.5 10/25/2021            [employer] personal use       Dated from Jan. 13 to Jan. 26, 2022    Average bg 99    Time in target 94%     High 0 %     Very high 0 %     Low 6%     Very low 0 %         hypoglycemia overnight and when missing meals     Decrease basal rates              BASAL -     MN - 1.9 ----- decrease to 1. 8   8 am -1.9 ---decrease to 1.8                  Carb ratio      4.5        Sensitivity         20        Target BG       120 to 140        Using [employer] personal system but could not download               Jo  has allowed the patient to minimize hypoglycemia as alarms have predicted and avoided them     he also uses the arrows on the dexcom as follows:     If arrow is horizontal , he uses the predicted bolus     If the arrow is slanted up or down-- he increase or decrease by 0.5 units     If the arrow is vertical up or down - he increases or decreases by 1 unit                   Previous regimen          Levemir 40 units and 45 units pm          Humalog  1 unit per every 5 grams of CHO            Lipid Management             hyperlipidemia           Taking simvastatin 80 mg at night     Taking TriCor 160 mg at night     Taking Zetia 10 mg daily     July 2021     LDL - 79           Blood Pressure  Management:t      Hypertension      follows with nephrology       Taking metolazone 2.5 mg three times weekly      Taking  lasix 40 mg daily                     Microvascular Complication Monitoring      CKD stage III, nephrotic sx      Follows with nephrology        Neuropathy      Lyrica -- caused side effects      Gabapentin - caused side effects      On  norco for pain -- prescribed by primary         No cuts or sores on feet       last eye exam -- July . 2021 , no          Immunizations: Last pneumococcal immunization 2020                                Weight Related:        Obesity                              Other Diabetes Related Aspects                Lab Results   Component Value Date     TSH 2.300 08/29/2018 July 2021     TSH - 3.96        Lupus      Taking prednisone 5 mg daily                     Follow Up   Return in about 3 months (around 4/26/2022) for Recheck.  Patient was given instructions and counseling regarding his condition or for health maintenance advice. Please see specific information pulled into the AVS if appropriate.         This document has been electronically signed by KAHLIL Ovalles on January 26, 2022 15:45 CST.

## 2022-02-02 ENCOUNTER — DOCUMENTATION (OUTPATIENT)
Dept: ENDOCRINOLOGY | Facility: CLINIC | Age: 72
End: 2022-02-02

## 2022-02-14 ENCOUNTER — DOCUMENTATION (OUTPATIENT)
Dept: ENDOCRINOLOGY | Facility: CLINIC | Age: 72
End: 2022-02-14

## 2022-02-25 ENCOUNTER — DOCUMENTATION (OUTPATIENT)
Dept: ENDOCRINOLOGY | Facility: CLINIC | Age: 72
End: 2022-02-25

## 2022-03-16 ENCOUNTER — DOCUMENTATION (OUTPATIENT)
Dept: ENDOCRINOLOGY | Facility: CLINIC | Age: 72
End: 2022-03-16

## 2022-04-20 ENCOUNTER — TELEPHONE (OUTPATIENT)
Dept: ENDOCRINOLOGY | Facility: CLINIC | Age: 72
End: 2022-04-20

## 2022-04-20 NOTE — TELEPHONE ENCOUNTER
Pt would like a call back about his settings for his pump. Pt has pump training tomorrow. Please call pt to advise. Thank you

## 2022-05-03 ENCOUNTER — OFFICE VISIT (OUTPATIENT)
Dept: ENDOCRINOLOGY | Facility: CLINIC | Age: 72
End: 2022-05-03

## 2022-05-03 VITALS
DIASTOLIC BLOOD PRESSURE: 82 MMHG | SYSTOLIC BLOOD PRESSURE: 138 MMHG | OXYGEN SATURATION: 95 % | HEART RATE: 82 BPM | HEIGHT: 73 IN | BODY MASS INDEX: 35.37 KG/M2 | WEIGHT: 266.9 LBS

## 2022-05-03 DIAGNOSIS — E11.42 DIABETIC POLYNEUROPATHY ASSOCIATED WITH TYPE 2 DIABETES MELLITUS: ICD-10-CM

## 2022-05-03 DIAGNOSIS — Z79.4 TYPE 2 DIABETES MELLITUS WITH HYPOGLYCEMIA WITHOUT COMA, WITH LONG-TERM CURRENT USE OF INSULIN: Primary | ICD-10-CM

## 2022-05-03 DIAGNOSIS — E78.2 MIXED HYPERLIPIDEMIA: ICD-10-CM

## 2022-05-03 DIAGNOSIS — E11.649 TYPE 2 DIABETES MELLITUS WITH HYPOGLYCEMIA WITHOUT COMA, WITH LONG-TERM CURRENT USE OF INSULIN: Primary | ICD-10-CM

## 2022-05-03 DIAGNOSIS — F17.200 SMOKING: ICD-10-CM

## 2022-05-03 PROCEDURE — 95251 CONT GLUC MNTR ANALYSIS I&R: CPT | Performed by: NURSE PRACTITIONER

## 2022-05-03 PROCEDURE — 99214 OFFICE O/P EST MOD 30 MIN: CPT | Performed by: NURSE PRACTITIONER

## 2022-05-03 RX ORDER — LANCETS 30 GAUGE
EACH MISCELLANEOUS
Qty: 360 EACH | Refills: 3 | Status: SHIPPED | OUTPATIENT
Start: 2022-05-03 | End: 2022-05-06 | Stop reason: SDUPTHER

## 2022-05-03 NOTE — PROGRESS NOTES
"Chief Complaint  Diabetes    Subjective          Jovan Hardwick presents to [facility] ENDOCRINOLOGY  History of Present Illness      In office visit       71 year old male presents for follow up      Reason diabetes mellitus type 2    Diagnosed at age 50      Timing constant      Quality not controlled due to hypoglycemia             Severity high            He had been on radiation therapy and hormone therapy for prostate cancer               Macrovascular complications none     Microvascular complications -- neuropathy, CKD , proteinuria          Diabetes Regimen:Insulin through insulin pump       Blood Glucose Readings      Checks 4 times daily               Patient checks blood glucose levels 4 times daily and has been for the last 90 days       medtronic 770 G with sensor        average bg 110            Aggravating , prednisone for SLE                       Objective   Vital Signs:   /82   Pulse 82   Ht 185.4 cm (73\")   Wt 121 kg (266 lb 14.4 oz)   SpO2 95%   BMI 35.21 kg/m²     Physical Exam  Constitutional:       Appearance: Normal appearance.   Cardiovascular:      Rate and Rhythm: Regular rhythm.      Heart sounds: Normal heart sounds.   Pulmonary:      Breath sounds: Normal breath sounds.   Musculoskeletal:      Cervical back: Normal range of motion.   Neurological:      Mental Status: He is alert.        Result Review :   The following data was reviewed by: KAHLIL Ovalles on 05/03/2022:  Common labs    Common Labsle 7/21/21 10/19/21 10/25/21   Glucose 132 (A)     BUN 29 (A)     Creatinine 1.75 (A)     Sodium 143     Potassium 4.0     Chloride 102     Calcium 9.1     Hemoglobin A1C   5.5   PSA  0.25    (A) Abnormal value       Comments are available for some flowsheets but are not being displayed.                     Assessment and Plan    Diagnoses and all orders for this visit:    1. Type 2 diabetes mellitus with hypoglycemia without coma, with " long-term current use of insulin (HCC) (Primary)    2. Diabetic polyneuropathy associated with type 2 diabetes mellitus (HCC)    3. Mixed hyperlipidemia    4. Smoking    Other orders  -     glucose blood test strip; Test 4 daily,E11.649  Dispense: 360 each; Refill: 3  -     Lancets misc; Test 4 times daily , E11.649  Dispense: 360 each; Refill: 3         Glycemic Management     Diabetes mellitus type 2      Lab Results   Component Value Date    HGBA1C 5.5 10/25/2021            Medtronic 770 G     With sensor     Dated from April 20 to May 3, 2022     Average bg 110    Target 100%     High 0%     Low 0%     Patient is doing amazing in target 100% of the time , no change in dosage       No lows since changing but history of lows     Basal     MN - 1.45    Carb ratio    6.5     Correction     30                                      Medtronic sensor  has allowed the patient to minimize hypoglycemia as alarms have predicted and avoided them     he also uses the arrows on the sensor as follows:     If arrow is horizontal , he uses the predicted bolus     If the arrow is slanted up or down-- he increase or decrease by 0.5 units     If the arrow is vertical up or down - he increases or decreases by 1 unit                        Previous regimen          Levemir 40 units and 45 units pm          Humalog  1 unit per every 5 grams of CHO               Lipid Management             hyperlipidemia           Taking simvastatin 80 mg at night     Taking TriCor 160 mg at night     Taking Zetia 10 mg daily     July 2021     LDL - 79           Blood Pressure Management:t      Hypertension      follows with nephrology       Taking metolazone 2.5 mg three times weekly      Taking  lasix 40 mg daily                     Microvascular Complication Monitoring      CKD stage III, nephrotic sx      Follows with nephrology        Neuropathy      Lyrica -- caused side effects      Gabapentin - caused side effects      On  norco for pain --  prescribed by primary         No cuts or sores on feet       last eye exam -- July . 2021 , no DR         Immunizations: Last pneumococcal immunization 2020                                Weight Related:        Obesity            Class 2 Severe Obesity (BMI >=35 and <=39.9). Obesity-related health conditions include the following: diabetes mellitus. Obesity is unchanged. BMI is is above average; no BMI management plan is appropriate. We discussed portion control and increasing exercise.                      Other Diabetes Related Aspects                Lab Results   Component Value Date     TSH 2.300 08/29/2018 July 2021     TSH - 3.96        Lupus      Taking prednisone 5 mg daily                             Follow Up   Return in about 3 months (around 8/3/2022) for Recheck.  Patient was given instructions and counseling regarding his condition or for health maintenance advice. Please see specific information pulled into the AVS if appropriate.         This document has been electronically signed by KAHLIL Ovalles on May 3, 2022 15:44 CDT.

## 2022-05-06 ENCOUNTER — TELEPHONE (OUTPATIENT)
Dept: ENDOCRINOLOGY | Facility: CLINIC | Age: 72
End: 2022-05-06

## 2022-05-06 DIAGNOSIS — Z79.4 TYPE 2 DIABETES MELLITUS WITH HYPOGLYCEMIA WITHOUT COMA, WITH LONG-TERM CURRENT USE OF INSULIN: Primary | ICD-10-CM

## 2022-05-06 DIAGNOSIS — E11.649 TYPE 2 DIABETES MELLITUS WITH HYPOGLYCEMIA WITHOUT COMA, WITH LONG-TERM CURRENT USE OF INSULIN: Primary | ICD-10-CM

## 2022-05-06 RX ORDER — LANCETS 30 GAUGE
EACH MISCELLANEOUS
Qty: 360 EACH | Refills: 3 | Status: SHIPPED | OUTPATIENT
Start: 2022-05-06

## 2022-05-06 NOTE — TELEPHONE ENCOUNTER
[employer] told pt that his RX plan does not cover Accu check lancets OR test strips.     Pt has 770G pump, please call in other supplies or call and advise pt next steps.      Thanks

## 2022-08-11 ENCOUNTER — OFFICE VISIT (OUTPATIENT)
Dept: ENDOCRINOLOGY | Facility: CLINIC | Age: 72
End: 2022-08-11

## 2022-08-11 VITALS
HEIGHT: 73 IN | WEIGHT: 264.8 LBS | OXYGEN SATURATION: 94 % | SYSTOLIC BLOOD PRESSURE: 140 MMHG | DIASTOLIC BLOOD PRESSURE: 80 MMHG | HEART RATE: 87 BPM | BODY MASS INDEX: 35.09 KG/M2

## 2022-08-11 DIAGNOSIS — E78.2 MIXED HYPERLIPIDEMIA: Primary | ICD-10-CM

## 2022-08-11 DIAGNOSIS — Z79.4 TYPE 2 DIABETES MELLITUS WITH HYPOGLYCEMIA WITHOUT COMA, WITH LONG-TERM CURRENT USE OF INSULIN: ICD-10-CM

## 2022-08-11 DIAGNOSIS — I10 ESSENTIAL HYPERTENSION: ICD-10-CM

## 2022-08-11 DIAGNOSIS — E11.649 TYPE 2 DIABETES MELLITUS WITH HYPOGLYCEMIA WITHOUT COMA, WITH LONG-TERM CURRENT USE OF INSULIN: ICD-10-CM

## 2022-08-11 DIAGNOSIS — F17.200 SMOKING: ICD-10-CM

## 2022-08-11 LAB
EXPIRATION DATE: NORMAL
HBA1C MFR BLD: 5.9 %
Lab: 953

## 2022-08-11 PROCEDURE — 3044F HG A1C LEVEL LT 7.0%: CPT | Performed by: NURSE PRACTITIONER

## 2022-08-11 PROCEDURE — 83036 HEMOGLOBIN GLYCOSYLATED A1C: CPT | Performed by: NURSE PRACTITIONER

## 2022-08-11 PROCEDURE — 99214 OFFICE O/P EST MOD 30 MIN: CPT | Performed by: NURSE PRACTITIONER

## 2022-08-11 PROCEDURE — 95251 CONT GLUC MNTR ANALYSIS I&R: CPT | Performed by: NURSE PRACTITIONER

## 2022-08-11 NOTE — PROGRESS NOTES
"Chief Complaint  Diabetes    Subjective          Jovan Hardwick presents to [facility] ENDOCRINOLOGY  History of Present Illness        In office visit       71 year old male presents for follow up      Reason diabetes mellitus type 2    Diagnosed at age 50      Timing constant      Quality not controlled due to hypoglycemia             Severity high            He had been on radiation therapy and hormone therapy for prostate cancer               Macrovascular complications none     Microvascular complications -- neuropathy, CKD , proteinuria          Diabetes Regimen:Insulin through insulin pump       Blood Glucose Readings      Checks 4 times daily               Patient checks blood glucose levels 4 times daily and has been for the last 90 days       medtronic 770 G with sensor        average bg 105            Aggravating , prednisone for SLE          Review of Systems - General ROS: negative               Objective   Vital Signs:   /80   Pulse 87   Ht 185.4 cm (73\")   Wt 120 kg (264 lb 12.8 oz)   SpO2 94%   BMI 34.94 kg/m²     Physical Exam  Constitutional:       Appearance: Normal appearance.   Cardiovascular:      Rate and Rhythm: Regular rhythm.      Heart sounds: Normal heart sounds.   Pulmonary:      Breath sounds: Normal breath sounds.   Musculoskeletal:      Cervical back: Normal range of motion.   Neurological:      Mental Status: He is alert.        Result Review :   The following data was reviewed by: KAHLIL Ovalles on 05/03/2022:  Common labs    Common Labsle 7/8/22 7/25/22 8/11/22   Glucose 92     BUN 26     Creatinine 1.81 (A)     Sodium 139     Potassium 4.4     Chloride 104     Calcium 8.9     Hemoglobin A1C   5.9   PSA  <0.10    (A) Abnormal value       Comments are available for some flowsheets but are not being displayed.                       Assessment and Plan    Diagnoses and all orders for this visit:    1. Mixed hyperlipidemia " (Primary)    2. Type 2 diabetes mellitus with hypoglycemia without coma, with long-term current use of insulin (HCC)  -     POC Glycosylated Hemoglobin (Hb A1C)    3. Essential hypertension    4. Smoking         Glycemic Management     Diabetes mellitus type 2      Lab Results   Component Value Date    HGBA1C 5.9 08/11/2022         Medtronic 770 G     With sensor     Dated from July 29 to August 11, 2022     Average bg 105    Time in the target 99%     Low 1%     High 0 %       States the low was false     No change he is target 99% of the time             Basal     MN - 1.00    Carb ratio    6.5     Correction     30             Medtronic sensor  has allowed the patient to minimize hypoglycemia as alarms have predicted and avoided them     he also uses the arrows on the sensor as follows:     If arrow is horizontal , he uses the predicted bolus     If the arrow is slanted up or down-- he increase or decrease by 0.5 units     If the arrow is vertical up or down - he increases or decreases by 1 unit                        Previous regimen          Levemir 40 units and 45 units pm          Humalog  1 unit per every 5 grams of CHO               Lipid Management             hyperlipidemia           Taking simvastatin 80 mg at night     Taking TriCor 160 mg at night     Taking Zetia 10 mg daily     July 2021     LDL - 79           Blood Pressure Management:t      Hypertension      follows with nephrology       Taking metolazone 2.5 mg three times weekly      Taking  lasix 40 mg daily                     Microvascular Complication Monitoring      CKD stage III, nephrotic sx      Follows with nephrology        Neuropathy      Lyrica -- caused side effects      Gabapentin - caused side effects      On  norco for pain -- prescribed by primary         No cuts or sores on feet       last eye exam -- July . 2021 , no DR         Immunizations: Last pneumococcal immunization 2020                                Weight  Related:        Obesity    Body mass index is 34.94 kg/m².              Class 2 Severe Obesity (BMI >=35 and <=39.9). Obesity-related health conditions include the following: diabetes mellitus. Obesity is unchanged. BMI is is above average; no BMI management plan is appropriate. We discussed portion control and increasing exercise.                      Other Diabetes Related Aspects      Lab Results   Component Value Date    TSH 2.300 08/29/2018             Lupus      Taking prednisone 5 mg other every day              preventive care     Smoker    Jovan Hardwick  reports that he has been smoking. He has never used smokeless tobacco.. I have educated him on the risk of diseases from using tobacco products such as cancer and COPD.     I advised him to quit and he is not willing to quit.    I spent 3  minutes counseling the patient.                          Follow Up   Return in about 3 months (around 11/11/2022) for Recheck.  Patient was given instructions and counseling regarding his condition or for health maintenance advice. Please see specific information pulled into the AVS if appropriate.         This document has been electronically signed by KAHLIL Ovalles on August 11, 2022 17:17 CDT.

## 2022-11-22 ENCOUNTER — OFFICE VISIT (OUTPATIENT)
Dept: ENDOCRINOLOGY | Facility: CLINIC | Age: 72
End: 2022-11-22

## 2022-11-22 VITALS
HEIGHT: 73 IN | HEART RATE: 74 BPM | BODY MASS INDEX: 35.25 KG/M2 | SYSTOLIC BLOOD PRESSURE: 160 MMHG | OXYGEN SATURATION: 97 % | WEIGHT: 266 LBS | DIASTOLIC BLOOD PRESSURE: 62 MMHG

## 2022-11-22 DIAGNOSIS — Z79.4 TYPE 2 DIABETES MELLITUS WITH HYPOGLYCEMIA WITHOUT COMA, WITH LONG-TERM CURRENT USE OF INSULIN: Primary | ICD-10-CM

## 2022-11-22 DIAGNOSIS — E11.649 TYPE 2 DIABETES MELLITUS WITH HYPOGLYCEMIA WITHOUT COMA, WITH LONG-TERM CURRENT USE OF INSULIN: Primary | ICD-10-CM

## 2022-11-22 DIAGNOSIS — I10 ESSENTIAL HYPERTENSION: ICD-10-CM

## 2022-11-22 DIAGNOSIS — E78.2 MIXED HYPERLIPIDEMIA: ICD-10-CM

## 2022-11-22 DIAGNOSIS — E11.42 DIABETIC POLYNEUROPATHY ASSOCIATED WITH TYPE 2 DIABETES MELLITUS: ICD-10-CM

## 2022-11-22 PROCEDURE — 95251 CONT GLUC MNTR ANALYSIS I&R: CPT | Performed by: NURSE PRACTITIONER

## 2022-11-22 PROCEDURE — 99214 OFFICE O/P EST MOD 30 MIN: CPT | Performed by: NURSE PRACTITIONER

## 2022-11-22 NOTE — PROGRESS NOTES
"Chief Complaint  Diabetes    Subjective          Jovan Hardwick presents to [facility] ENDOCRINOLOGY  Diabetes          In office visit       72 year old male presents for follow up      Reason diabetes mellitus type 2    Diagnosed at age 50      Timing constant      Quality not controlled due to hypoglycemia             Severity high            He had been on radiation therapy and hormone therapy for prostate cancer               Macrovascular complications none     Microvascular complications -- neuropathy, CKD , proteinuria          Diabetes Regimen:Insulin through insulin pump       Blood Glucose Readings      Checks 4 times daily               Patient checks blood glucose levels 4 times daily and has been for the last 90 days       medtronic 770 G with sensor        average bg 103           Aggravating , prednisone for SLE          Review of Systems - General ROS: negative               Objective   Vital Signs:   /62   Pulse 74   Ht 185.4 cm (73\")   Wt 121 kg (266 lb)   SpO2 97%   BMI 35.09 kg/m²     Physical Exam  Constitutional:       Appearance: Normal appearance.   Cardiovascular:      Rate and Rhythm: Regular rhythm.      Heart sounds: Normal heart sounds.   Pulmonary:      Breath sounds: Normal breath sounds.   Musculoskeletal:      Cervical back: Normal range of motion.   Neurological:      Mental Status: He is alert.        Result Review :   The following data was reviewed by: KAHLIL Ovalles on 05/03/2022:  Common labs    Common Labs 7/8/22 7/25/22 8/11/22   Glucose 92     BUN 26     Creatinine 1.81 (A)     Sodium 139     Potassium 4.4     Chloride 104     Calcium 8.9     Hemoglobin A1C   5.9   PSA  <0.10    (A) Abnormal value       Comments are available for some flowsheets but are not being displayed.                       Assessment and Plan    Diagnoses and all orders for this visit:    1. Type 2 diabetes mellitus with hypoglycemia without coma, " with long-term current use of insulin (HCC) (Primary)    2. Diabetic polyneuropathy associated with type 2 diabetes mellitus (HCC)    3. Mixed hyperlipidemia    4. Essential hypertension         Glycemic Management     Diabetes mellitus type 2      Lab Results   Component Value Date    HGBA1C 5.9 08/11/2022         Medtronic 770 G     With sensor     Download and reviewed     Dated from Nov 9 to Nov. 22, 2022    Average 103    Time in target 99 %     Low 1%    No changes ---  He is in target 99% of the time             Basal     MN - 1.00    Carb ratio    6.5     Correction     30             Medtronic sensor  has allowed the patient to minimize hypoglycemia as alarms have predicted and avoided them     he also uses the arrows on the sensor as follows:     If arrow is horizontal , he uses the predicted bolus     If the arrow is slanted up or down-- he increase or decrease by 0.5 units     If the arrow is vertical up or down - he increases or decreases by 1 unit                        Previous regimen          Levemir 40 units and 45 units pm          Humalog  1 unit per every 5 grams of CHO               Lipid Management             hyperlipidemia           Taking simvastatin 80 mg at night     Taking TriCor 160 mg at night     Taking Zetia 10 mg daily     July 2021     LDL - 79           Blood Pressure Management:t      Hypertension      follows with nephrology       Taking metolazone 2.5 mg three times weekly      Taking  lasix 40 mg daily                     Microvascular Complication Monitoring      CKD stage III, nephrotic sx      Follows with nephrology        Neuropathy      Lyrica -- caused side effects      Gabapentin - caused side effects      On  norco for pain -- prescribed by primary         No cuts or sores on feet       last eye exam -- July . 2021 , no          Immunizations: Last pneumococcal immunization 2020                                Weight Related:        Obesity    Body mass index is  35.09 kg/m².              Class 2 Severe Obesity (BMI >=35 and <=39.9). Obesity-related health conditions include the following: diabetes mellitus. Obesity is unchanged. BMI is is above average; no BMI management plan is appropriate. We discussed portion control and increasing exercise.                      Other Diabetes Related Aspects      Lab Results   Component Value Date    TSH 2.300 08/29/2018             Lupus      Taking prednisone 5 mg other every day              preventive care     Smoker    Jovan Hardwick  reports that he has been smoking. He has never used smokeless tobacco.. I have educated him on the risk of diseases from using tobacco products such as cancer and COPD.     I advised him to quit and he is not willing to quit.    I spent 3  minutes counseling the patient.                          Follow Up   Return in about 3 months (around 2/22/2023) for Recheck.  Patient was given instructions and counseling regarding his condition or for health maintenance advice. Please see specific information pulled into the AVS if appropriate.         This document has been electronically signed by KAHLIL Ovalles on November 22, 2022 14:50 CST.

## 2023-02-08 ENCOUNTER — OFFICE VISIT (OUTPATIENT)
Dept: ENDOCRINOLOGY | Facility: CLINIC | Age: 73
End: 2023-02-08
Payer: MEDICARE

## 2023-02-08 VITALS
SYSTOLIC BLOOD PRESSURE: 160 MMHG | DIASTOLIC BLOOD PRESSURE: 62 MMHG | OXYGEN SATURATION: 98 % | WEIGHT: 262.8 LBS | BODY MASS INDEX: 34.83 KG/M2 | HEART RATE: 52 BPM | HEIGHT: 73 IN

## 2023-02-08 DIAGNOSIS — E78.2 MIXED HYPERLIPIDEMIA: Primary | ICD-10-CM

## 2023-02-08 DIAGNOSIS — I10 ESSENTIAL HYPERTENSION: ICD-10-CM

## 2023-02-08 DIAGNOSIS — Z79.4 TYPE 2 DIABETES MELLITUS WITH HYPOGLYCEMIA WITHOUT COMA, WITH LONG-TERM CURRENT USE OF INSULIN: ICD-10-CM

## 2023-02-08 DIAGNOSIS — E11.42 DIABETIC POLYNEUROPATHY ASSOCIATED WITH TYPE 2 DIABETES MELLITUS: ICD-10-CM

## 2023-02-08 DIAGNOSIS — E11.649 TYPE 2 DIABETES MELLITUS WITH HYPOGLYCEMIA WITHOUT COMA, WITH LONG-TERM CURRENT USE OF INSULIN: ICD-10-CM

## 2023-02-08 DIAGNOSIS — F17.200 SMOKING: ICD-10-CM

## 2023-02-08 LAB
EXPIRATION DATE: NORMAL
HBA1C MFR BLD: 5.5 %
Lab: 995

## 2023-02-08 PROCEDURE — 95251 CONT GLUC MNTR ANALYSIS I&R: CPT | Performed by: NURSE PRACTITIONER

## 2023-02-08 PROCEDURE — 83036 HEMOGLOBIN GLYCOSYLATED A1C: CPT | Performed by: NURSE PRACTITIONER

## 2023-02-08 PROCEDURE — 3044F HG A1C LEVEL LT 7.0%: CPT | Performed by: NURSE PRACTITIONER

## 2023-02-08 PROCEDURE — 99214 OFFICE O/P EST MOD 30 MIN: CPT | Performed by: NURSE PRACTITIONER

## 2023-02-08 NOTE — PROGRESS NOTES
"Chief Complaint  Diabetes    Subjective          Jovan Hardwick presents to [facility] ENDOCRINOLOGY  Diabetes          In office visit       72 year old male presents for follow up      Reason diabetes mellitus type 2    Diagnosed at age 50      Timing constant      Quality not controlled due to hypoglycemia      Severity high            He had been on radiation therapy and hormone therapy for prostate cancer               Macrovascular complications none     Microvascular complications -- neuropathy, CKD , proteinuria          Diabetes Regimen:Insulin through insulin pump       Blood Glucose Readings      Checks 4 times daily               Patient checks blood glucose levels 4 times daily and has been for the last 90 days       medtronic 770 G with sensor        average bg 100           Aggravating , prednisone for SLE          Review of Systems - General ROS: negative               Objective   Vital Signs:   /62   Pulse 52   Ht 185.4 cm (73\")   Wt 119 kg (262 lb 12.8 oz)   SpO2 98%   BMI 34.67 kg/m²     Physical Exam  Constitutional:       Appearance: Normal appearance.   Cardiovascular:      Rate and Rhythm: Regular rhythm.      Heart sounds: Normal heart sounds.   Pulmonary:      Breath sounds: Normal breath sounds.   Musculoskeletal:      Cervical back: Normal range of motion.   Neurological:      Mental Status: He is alert.        Result Review :   The following data was reviewed by: KAHLIL Ovalles on 05/03/2022:  Common labs    Common Labs 7/25/22 8/11/22 1/23/23   Hemoglobin A1C  5.9    PSA <0.10  <0.10      Comments are available for some flowsheets but are not being displayed.                       Assessment and Plan    Diagnoses and all orders for this visit:    1. Mixed hyperlipidemia (Primary)    2. Type 2 diabetes mellitus with hypoglycemia without coma, with long-term current use of insulin ([facility])  -     glucose blood test strip; Test 8  " daily,E11.649  Dispense: 240 each; Refill: 11  -     POC Glycosylated Hemoglobin (Hb A1C)    3. Essential hypertension    4. Diabetic polyneuropathy associated with type 2 diabetes mellitus (HCC)    5. Smoking         Glycemic Management     Diabetes mellitus type 2       Lab Results   Component Value Date    HGBA1C 5.5 02/08/2023         Medtronic 770 G     With sensor         Ambulatory Glucose Profile Report    Days Analyzed : 2 week period ending on 02/08/23      Continuous Glucose Monitory Device:  Guardian sensor     - 0% very high target range  - 0% high target range  - 100% in target range  - 0% below target range  - GMI 6.4 %  - Average glucose 131mg/dl    Interpretation : Diabetes Type 2        Patient has great control    He is 100% in range  He has had occassional low but none in the last 2 weeks     No changes today           Basal     MN - 1.00    Carb ratio    6.5     Correction     30             Medtronic sensor  has allowed the patient to minimize hypoglycemia as alarms have predicted and avoided them     he also uses the arrows on the sensor as follows:     If arrow is horizontal , he uses the predicted bolus     If the arrow is slanted up or down-- he increase or decrease by 0.5 units     If the arrow is vertical up or down - he increases or decreases by 1 unit                        Previous regimen          Levemir 40 units and 45 units pm          Humalog  1 unit per every 5 grams of CHO               Lipid Management             hyperlipidemia           Taking simvastatin 80 mg at night     Taking TriCor 160 mg at night     Taking Zetia 10 mg daily     July 2021     LDL - 79           Blood Pressure Management:t      Hypertension      follows with nephrology       Taking metolazone 2.5 mg three times weekly      Taking  lasix 40 mg daily                     Microvascular Complication Monitoring      CKD stage III, nephrotic sx      Follows with nephrology        Neuropathy      Lyrica --  caused side effects      Gabapentin - caused side effects      On  norco for pain -- prescribed by primary         No cuts or sores on feet       last eye exam -- July . 2021 , no                      Weight Related:        Obesity    Body mass index is 34.67 kg/m².                       Other Diabetes Related Aspects      Lab Results   Component Value Date    TSH 2.300 08/29/2018             Lupus      Taking prednisone 5 mg other every day              preventive care     Smoker    Jovan Hardwick  reports that he has been smoking. He has never used smokeless tobacco.. I have educated him on the risk of diseases from using tobacco products such as cancer and COPD.     I advised him to quit and he is not willing to quit.    I spent 3  minutes counseling the patient.                          Follow Up   No follow-ups on file.  Patient was given instructions and counseling regarding his condition or for health maintenance advice. Please see specific information pulled into the AVS if appropriate.         This document has been electronically signed by KAHLIL Ovalles on February 8, 2023 14:14 CST.

## 2023-05-17 ENCOUNTER — DOCUMENTATION (OUTPATIENT)
Dept: ENDOCRINOLOGY | Facility: CLINIC | Age: 73
End: 2023-05-17
Payer: MEDICARE

## 2023-05-24 DIAGNOSIS — E11.649 TYPE 2 DIABETES MELLITUS WITH HYPOGLYCEMIA WITHOUT COMA, WITH LONG-TERM CURRENT USE OF INSULIN: ICD-10-CM

## 2023-05-24 DIAGNOSIS — Z79.4 TYPE 2 DIABETES MELLITUS WITH HYPOGLYCEMIA WITHOUT COMA, WITH LONG-TERM CURRENT USE OF INSULIN: ICD-10-CM

## 2023-05-24 RX ORDER — BLOOD SUGAR DIAGNOSTIC
STRIP MISCELLANEOUS
Qty: 40 EACH | Refills: 0 | Status: SHIPPED | OUTPATIENT
Start: 2023-05-24 | End: 2023-05-25 | Stop reason: SDUPTHER

## 2023-05-25 ENCOUNTER — OFFICE VISIT (OUTPATIENT)
Dept: ENDOCRINOLOGY | Facility: CLINIC | Age: 73
End: 2023-05-25
Payer: MEDICARE

## 2023-05-25 VITALS
DIASTOLIC BLOOD PRESSURE: 80 MMHG | WEIGHT: 255.6 LBS | HEART RATE: 84 BPM | SYSTOLIC BLOOD PRESSURE: 140 MMHG | HEIGHT: 73 IN | BODY MASS INDEX: 33.88 KG/M2 | OXYGEN SATURATION: 95 %

## 2023-05-25 DIAGNOSIS — E78.2 MIXED HYPERLIPIDEMIA: ICD-10-CM

## 2023-05-25 DIAGNOSIS — E11.649 TYPE 2 DIABETES MELLITUS WITH HYPOGLYCEMIA WITHOUT COMA, WITH LONG-TERM CURRENT USE OF INSULIN: ICD-10-CM

## 2023-05-25 DIAGNOSIS — F17.200 SMOKING: ICD-10-CM

## 2023-05-25 DIAGNOSIS — Z79.4 TYPE 2 DIABETES MELLITUS WITH HYPOGLYCEMIA WITHOUT COMA, WITH LONG-TERM CURRENT USE OF INSULIN: ICD-10-CM

## 2023-05-25 DIAGNOSIS — I10 ESSENTIAL HYPERTENSION: ICD-10-CM

## 2023-05-25 DIAGNOSIS — E11.42 DIABETIC POLYNEUROPATHY ASSOCIATED WITH TYPE 2 DIABETES MELLITUS: Primary | ICD-10-CM

## 2023-05-25 LAB — HBA1C MFR BLD: 5.6 %

## 2023-05-25 NOTE — PROGRESS NOTES
"Chief Complaint  Diabetes    Subjective          Jovan Hardwick presents to [facility] ENDOCRINOLOGY  Diabetes          In office visit       Primary provider Dr. Toure    72-year-old male presents for follow-up    Diabetes mellitus type 2        Timing constant    Quality controlled    Severity is high      Complications neuropathy, CKD    Current diabetes regimen    Insulin through insulin pump    Has Medtronic 770    Current glucose monitoring    Checks 4 times daily      Medtronic Guardian sensor      See below     Review of Systems - General ROS: negative          Objective   Vital Signs:   /80   Pulse 84   Ht 185.4 cm (73\")   Wt 116 kg (255 lb 9.6 oz)   SpO2 95%   BMI 33.72 kg/m²     Physical Exam  Constitutional:       Appearance: Normal appearance.   Cardiovascular:      Rate and Rhythm: Regular rhythm.      Heart sounds: Normal heart sounds.   Pulmonary:      Breath sounds: Normal breath sounds.   Musculoskeletal:      Cervical back: Normal range of motion.   Neurological:      Mental Status: He is alert.        Result Review :   The following data was reviewed by: KAHLIL Ovalles on 05/03/2022:  Common labs        2/21/2023    14:39 3/1/2023    12:54 5/25/2023    00:00   Common Labs   Glucose  142         BUN  29         Creatinine  1.85         Sodium  139         Potassium  4.74         Chloride  104         Calcium  8.6         Albumin  3.2         Total Bilirubin  0.2         Alkaline Phosphatase  52         AST (SGOT)  17         ALT (SGPT)  12         WBC  6.6         Hemoglobin  11.1         Hematocrit  34.2         Platelets  149         Hemoglobin A1C   5.6        Uric Acid 6.3              This result is from an external source.                 Assessment and Plan    Diagnoses and all orders for this visit:    1. Diabetic polyneuropathy associated with type 2 diabetes mellitus (Primary)    2. Type 2 diabetes mellitus with hypoglycemia without " coma, with long-term current use of insulin  -     glucose blood (Accu-Chek Guide) test strip; Test 4 times daily  Dispense: 120 each; Refill: 11    3. Mixed hyperlipidemia    4. Essential hypertension    5. Smoking         Glycemic Management     Diabetes mellitus type 2       Lab Results   Component Value Date    HGBA1C 5.6 05/25/2023         Medtronic 770 G     With sensor         Ambulatory Glucose Profile Report    Days Analyzed : 2 week period ending on 05/25/23      Continuous Glucose Monitory Device: Medtronic guardian      - 0% very high target range  - 0% high target range             100% in target range  - 0 % below target range  - GMI 5.6 %  - Average glucose 100 mg/dl    Interpretation : Diabetes Type 2 Controlled               Basal     MN - 1.00    Carb ratio    6.5     Correction     30             Medtronic sensor  has allowed the patient to minimize hypoglycemia as alarms have predicted and avoided them     he also uses the arrows on the sensor as follows:     If arrow is horizontal , he uses the predicted bolus     If the arrow is slanted up or down-- he increase or decrease by 0.5 units     If the arrow is vertical up or down - he increases or decreases by 1 unit                        Previous regimen          Levemir 40 units and 45 units pm          Humalog  1 unit per every 5 grams of CHO               Lipid Management             hyperlipidemia           Taking simvastatin 80 mg at night     Taking TriCor 160 mg at night     Taking Zetia 10 mg daily     July 2021     LDL - 79           Blood Pressure Management:t      Hypertension      follows with nephrology       Taking metolazone 2.5 mg three times weekly      Taking  lasix 40 mg daily                     Microvascular Complication Monitoring      CKD stage III, nephrotic sx      Follows with nephrology        Neuropathy      Lyrica -- caused side effects      Gabapentin - caused side effects      On  norco for pain -- prescribed by  primary         No cuts or sores on feet       last eye exam -- July . 2021 , no DR               Other Diabetes Related Aspects      Lab Results   Component Value Date    TSH 2.300 08/29/2018             Lupus      Taking prednisone 5 mg other every day              preventive care     Smoker        Jovan Hardwick  reports that he has been smoking. He has never used smokeless tobacco.. I have educated him on the risk of diseases from using tobacco products such as cancer, COPD and heart disease.     I advised him to quit and he is not willing to quit.    I spent 3  minutes counseling the patient.                        Follow Up   Return in about 3 months (around 8/25/2023) for Recheck.  Patient was given instructions and counseling regarding his condition or for health maintenance advice. Please see specific information pulled into the AVS if appropriate.         This document has been electronically signed by KAHLIL Ovalles on May 25, 2023 14:26 CDT.

## 2023-06-01 DIAGNOSIS — Z79.4 TYPE 2 DIABETES MELLITUS WITH HYPOGLYCEMIA WITHOUT COMA, WITH LONG-TERM CURRENT USE OF INSULIN: ICD-10-CM

## 2023-06-01 DIAGNOSIS — E11.649 TYPE 2 DIABETES MELLITUS WITH HYPOGLYCEMIA WITHOUT COMA, WITH LONG-TERM CURRENT USE OF INSULIN: ICD-10-CM

## 2023-06-01 NOTE — TELEPHONE ENCOUNTER
MARK FROM [facility] LEFT VM REQUESTING NEW RX FOR TEST STRIPS TO BE SENT IN FOR CORRECT QUANTITY FOR COMPLETE BOX.    NEW RX SENT

## 2023-06-07 DIAGNOSIS — E11.649 TYPE 2 DIABETES MELLITUS WITH HYPOGLYCEMIA WITHOUT COMA, WITH LONG-TERM CURRENT USE OF INSULIN: ICD-10-CM

## 2023-06-07 DIAGNOSIS — Z79.4 TYPE 2 DIABETES MELLITUS WITH HYPOGLYCEMIA WITHOUT COMA, WITH LONG-TERM CURRENT USE OF INSULIN: ICD-10-CM

## 2023-06-07 RX ORDER — BLOOD SUGAR DIAGNOSTIC
STRIP MISCELLANEOUS
Qty: 540 EACH | Refills: 3 | Status: SHIPPED | OUTPATIENT
Start: 2023-06-07 | End: 2023-06-07 | Stop reason: SDUPTHER

## 2023-06-07 RX ORDER — BLOOD SUGAR DIAGNOSTIC
STRIP MISCELLANEOUS
Qty: 540 EACH | Refills: 3 | Status: SHIPPED | OUTPATIENT
Start: 2023-06-07

## 2023-09-05 ENCOUNTER — DOCUMENTATION (OUTPATIENT)
Dept: ENDOCRINOLOGY | Facility: CLINIC | Age: 73
End: 2023-09-05
Payer: MEDICARE

## 2023-09-05 NOTE — PROGRESS NOTES
[employer] SENT UPGRADE REQUEST FOR 780G AND GUARDIAN SENSOR 4. CMN FAXED TO [employer] @ [phone]

## 2023-09-07 ENCOUNTER — OFFICE VISIT (OUTPATIENT)
Dept: ENDOCRINOLOGY | Facility: CLINIC | Age: 73
End: 2023-09-07
Payer: MEDICARE

## 2023-09-07 VITALS
HEIGHT: 73 IN | BODY MASS INDEX: 33.48 KG/M2 | HEART RATE: 83 BPM | OXYGEN SATURATION: 95 % | DIASTOLIC BLOOD PRESSURE: 74 MMHG | SYSTOLIC BLOOD PRESSURE: 140 MMHG | WEIGHT: 252.6 LBS

## 2023-09-07 DIAGNOSIS — E78.2 MIXED HYPERLIPIDEMIA: ICD-10-CM

## 2023-09-07 DIAGNOSIS — E11.42 DIABETIC POLYNEUROPATHY ASSOCIATED WITH TYPE 2 DIABETES MELLITUS: Primary | ICD-10-CM

## 2023-09-07 DIAGNOSIS — I10 ESSENTIAL HYPERTENSION: ICD-10-CM

## 2023-09-07 DIAGNOSIS — N18.32 STAGE 3B CHRONIC KIDNEY DISEASE: ICD-10-CM

## 2023-09-07 RX ORDER — PENICILLIN V POTASSIUM 500 MG/1
500 TABLET ORAL 4 TIMES DAILY
COMMUNITY

## 2023-09-07 RX ORDER — FLUOXETINE 10 MG/1
10 CAPSULE ORAL DAILY
COMMUNITY

## 2023-09-07 RX ORDER — SULFAMETHOXAZOLE AND TRIMETHOPRIM 800; 160 MG/1; MG/1
1 TABLET ORAL 2 TIMES DAILY
COMMUNITY

## 2023-09-07 RX ORDER — HYDROXYCHLOROQUINE SULFATE 200 MG/1
200 TABLET, FILM COATED ORAL DAILY
COMMUNITY

## 2023-09-07 RX ORDER — ROPINIROLE 1 MG/1
1 TABLET, FILM COATED ORAL NIGHTLY
COMMUNITY

## 2023-09-07 RX ORDER — NYSTATIN 100000 [USP'U]/G
POWDER TOPICAL 4 TIMES DAILY
COMMUNITY

## 2023-09-07 RX ORDER — GLUCAGON 3 MG/1
1 POWDER NASAL AS NEEDED
Qty: 2 EACH | Refills: 11 | Status: SHIPPED | OUTPATIENT
Start: 2023-09-07

## 2023-09-13 ENCOUNTER — TELEPHONE (OUTPATIENT)
Dept: ENDOCRINOLOGY | Facility: CLINIC | Age: 73
End: 2023-09-13
Payer: MEDICARE

## 2023-09-13 NOTE — TELEPHONE ENCOUNTER
Latia Hardwick called and was wanting to know the status of the upgrade on the sensor.  [employer] is saying that they need a prescription sent to them for the upgrade.  [employer] says that they have sent [facility] a form to fill out but have not received that back as of yet. This has been going on for about 2 weeks. [employer] says that they have faxed the form to [facility].    Please call and advise    Pt call [phone]

## 2023-09-14 ENCOUNTER — DOCUMENTATION (OUTPATIENT)
Dept: ENDOCRINOLOGY | Facility: CLINIC | Age: 73
End: 2023-09-14
Payer: MEDICARE

## 2023-09-21 ENCOUNTER — APPOINTMENT (OUTPATIENT)
Dept: GENERAL RADIOLOGY | Facility: HOSPITAL | Age: 73
DRG: 243 | End: 2023-09-21
Payer: MEDICARE

## 2023-09-21 ENCOUNTER — HOSPITAL ENCOUNTER (INPATIENT)
Facility: HOSPITAL | Age: 73
LOS: 4 days | Discharge: HOME | DRG: 243 | End: 2023-09-25
Attending: EMERGENCY MEDICINE
Payer: MEDICARE

## 2023-09-21 ENCOUNTER — APPOINTMENT (OUTPATIENT)
Dept: CARDIOLOGY | Facility: HOSPITAL | Age: 73
DRG: 243 | End: 2023-09-21
Payer: MEDICARE

## 2023-09-21 ENCOUNTER — PREP FOR SURGERY (OUTPATIENT)
Dept: OTHER | Facility: HOSPITAL | Age: 73
End: 2023-09-21
Payer: MEDICARE

## 2023-09-21 DIAGNOSIS — Z74.09 IMPAIRED FUNCTIONAL MOBILITY, BALANCE, GAIT, AND ENDURANCE: ICD-10-CM

## 2023-09-21 DIAGNOSIS — Z78.9 IMPAIRED MOBILITY AND ACTIVITIES OF DAILY LIVING: ICD-10-CM

## 2023-09-21 DIAGNOSIS — I44.2 CHB (COMPLETE HEART BLOCK): ICD-10-CM

## 2023-09-21 DIAGNOSIS — K92.0 COFFEE GROUND EMESIS: ICD-10-CM

## 2023-09-21 DIAGNOSIS — I44.2 CHB (COMPLETE HEART BLOCK): Primary | ICD-10-CM

## 2023-09-21 DIAGNOSIS — I44.2 THIRD DEGREE HEART BLOCK: Primary | ICD-10-CM

## 2023-09-21 DIAGNOSIS — Z74.09 IMPAIRED MOBILITY AND ACTIVITIES OF DAILY LIVING: ICD-10-CM

## 2023-09-21 PROBLEM — I10 HYPERTENSION: Status: ACTIVE | Noted: 2023-09-21

## 2023-09-21 PROBLEM — D53.9 MACROCYTIC ANEMIA: Status: ACTIVE | Noted: 2023-09-21

## 2023-09-21 PROBLEM — R77.8 ELEVATED TROPONIN: Status: ACTIVE | Noted: 2023-09-21

## 2023-09-21 PROBLEM — R79.89 ELEVATED TROPONIN: Status: ACTIVE | Noted: 2023-09-21

## 2023-09-21 LAB
ALBUMIN SERPL-MCNC: 3.1 G/DL (ref 3.5–5.2)
ALBUMIN/GLOB SERPL: 1.3 G/DL
ALP SERPL-CCNC: 50 U/L (ref 39–117)
ALT SERPL W P-5'-P-CCNC: 12 U/L (ref 1–41)
ANION GAP SERPL CALCULATED.3IONS-SCNC: 13 MMOL/L (ref 5–15)
ANION GAP SERPL CALCULATED.3IONS-SCNC: 9 MMOL/L (ref 5–15)
AST SERPL-CCNC: 15 U/L (ref 1–40)
BASOPHILS # BLD AUTO: 0.03 10*3/MM3 (ref 0–0.2)
BASOPHILS NFR BLD AUTO: 0.4 % (ref 0–1.5)
BH CV ECHO MEAS - ACS: 1.83 CM
BH CV ECHO MEAS - AO MAX PG: 31 MMHG
BH CV ECHO MEAS - AO MEAN PG: 16.2 MMHG
BH CV ECHO MEAS - AO ROOT DIAM: 2.7 CM
BH CV ECHO MEAS - AO V2 MAX: 278.3 CM/SEC
BH CV ECHO MEAS - AO V2 VTI: 48.7 CM
BH CV ECHO MEAS - AVA(I,D): 2.7 CM2
BH CV ECHO MEAS - EDV(CUBED): 130 ML
BH CV ECHO MEAS - EDV(MOD-SP2): 210 ML
BH CV ECHO MEAS - EDV(MOD-SP4): 226 ML
BH CV ECHO MEAS - EF(MOD-BP): 66.5 %
BH CV ECHO MEAS - EF(MOD-SP2): 65 %
BH CV ECHO MEAS - EF(MOD-SP4): 66.9 %
BH CV ECHO MEAS - ESV(CUBED): 59.9 ML
BH CV ECHO MEAS - ESV(MOD-SP2): 73.6 ML
BH CV ECHO MEAS - ESV(MOD-SP4): 74.9 ML
BH CV ECHO MEAS - FS: 22.7 %
BH CV ECHO MEAS - IVS/LVPW: 0.77 CM
BH CV ECHO MEAS - IVSD: 1.05 CM
BH CV ECHO MEAS - LA DIMENSION: 3.5 CM
BH CV ECHO MEAS - LV DIASTOLIC VOL/BSA (35-75): 95.9 CM2
BH CV ECHO MEAS - LV MASS(C)D: 240.7 GRAMS
BH CV ECHO MEAS - LV MAX PG: 9.9 MMHG
BH CV ECHO MEAS - LV MEAN PG: 5.3 MMHG
BH CV ECHO MEAS - LV SYSTOLIC VOL/BSA (12-30): 31.8 CM2
BH CV ECHO MEAS - LV V1 MAX: 157.2 CM/SEC
BH CV ECHO MEAS - LV V1 VTI: 35.5 CM
BH CV ECHO MEAS - LVIDD: 5.1 CM
BH CV ECHO MEAS - LVIDS: 3.9 CM
BH CV ECHO MEAS - LVOT AREA: 3.7 CM2
BH CV ECHO MEAS - LVOT DIAM: 2.17 CM
BH CV ECHO MEAS - LVPWD: 1.36 CM
BH CV ECHO MEAS - MV A MAX VEL: 138.9 CM/SEC
BH CV ECHO MEAS - MV DEC SLOPE: 790.8 CM/SEC2
BH CV ECHO MEAS - MV E MAX VEL: 125 CM/SEC
BH CV ECHO MEAS - MV E/A: 0.9
BH CV ECHO MEAS - MV MAX PG: 8.6 MMHG
BH CV ECHO MEAS - MV MEAN PG: 2.7 MMHG
BH CV ECHO MEAS - MV P1/2T: 53.8 MSEC
BH CV ECHO MEAS - MV V2 VTI: 49.1 CM
BH CV ECHO MEAS - MVA(P1/2T): 4.1 CM2
BH CV ECHO MEAS - MVA(VTI): 2.7 CM2
BH CV ECHO MEAS - PA V2 MAX: 173.4 CM/SEC
BH CV ECHO MEAS - RAP SYSTOLE: 5 MMHG
BH CV ECHO MEAS - RVDD: 4 CM
BH CV ECHO MEAS - RVSP: 27.8 MMHG
BH CV ECHO MEAS - SI(MOD-SP2): 57.9 ML/M2
BH CV ECHO MEAS - SI(MOD-SP4): 64.1 ML/M2
BH CV ECHO MEAS - SV(LVOT): 131.5 ML
BH CV ECHO MEAS - SV(MOD-SP2): 136.4 ML
BH CV ECHO MEAS - SV(MOD-SP4): 151.1 ML
BH CV ECHO MEAS - TAPSE (>1.6): 2.33 CM
BH CV ECHO MEAS - TR MAX PG: 22.8 MMHG
BH CV ECHO MEAS - TR MAX VEL: 238.9 CM/SEC
BILIRUB SERPL-MCNC: 0.2 MG/DL (ref 0–1.2)
BUN SERPL-MCNC: 34 MG/DL (ref 8–23)
BUN SERPL-MCNC: 35 MG/DL (ref 8–23)
BUN/CREAT SERPL: 17.3 (ref 7–25)
BUN/CREAT SERPL: 17.7 (ref 7–25)
CALCIUM SPEC-SCNC: 8.8 MG/DL (ref 8.6–10.5)
CALCIUM SPEC-SCNC: 9.4 MG/DL (ref 8.6–10.5)
CHLORIDE SERPL-SCNC: 102 MMOL/L (ref 98–107)
CHLORIDE SERPL-SCNC: 105 MMOL/L (ref 98–107)
CO2 SERPL-SCNC: 24 MMOL/L (ref 22–29)
CO2 SERPL-SCNC: 27 MMOL/L (ref 22–29)
CREAT SERPL-MCNC: 1.92 MG/DL (ref 0.76–1.27)
CREAT SERPL-MCNC: 2.02 MG/DL (ref 0.76–1.27)
DEPRECATED RDW RBC AUTO: 50.7 FL (ref 37–54)
DEPRECATED RDW RBC AUTO: 54.4 FL (ref 37–54)
EGFRCR SERPLBLD CKD-EPI 2021: 34.4 ML/MIN/1.73
EGFRCR SERPLBLD CKD-EPI 2021: 36.6 ML/MIN/1.73
EOSINOPHIL # BLD AUTO: 0.05 10*3/MM3 (ref 0–0.4)
EOSINOPHIL NFR BLD AUTO: 0.6 % (ref 0.3–6.2)
ERYTHROCYTE [DISTWIDTH] IN BLOOD BY AUTOMATED COUNT: 14.1 % (ref 12.3–15.4)
ERYTHROCYTE [DISTWIDTH] IN BLOOD BY AUTOMATED COUNT: 14.2 % (ref 12.3–15.4)
FOLATE SERPL-MCNC: >20 NG/ML (ref 4.78–24.2)
GASTROCULT GAST QL: POSITIVE
GEN 5 2HR TROPONIN T REFLEX: 54 NG/L
GLOBULIN UR ELPH-MCNC: 2.4 GM/DL
GLUCOSE BLDC GLUCOMTR-MCNC: 109 MG/DL (ref 70–130)
GLUCOSE BLDC GLUCOMTR-MCNC: 137 MG/DL (ref 70–130)
GLUCOSE BLDC GLUCOMTR-MCNC: 139 MG/DL (ref 70–130)
GLUCOSE SERPL-MCNC: 191 MG/DL (ref 65–99)
GLUCOSE SERPL-MCNC: 94 MG/DL (ref 65–99)
HCT VFR BLD AUTO: 33.9 % (ref 37.5–51)
HCT VFR BLD AUTO: 34.6 % (ref 37.5–51)
HGB BLD-MCNC: 11.1 G/DL (ref 13–17.7)
HGB BLD-MCNC: 11.9 G/DL (ref 13–17.7)
HOLD SPECIMEN: NORMAL
IMM GRANULOCYTES # BLD AUTO: 0.09 10*3/MM3 (ref 0–0.05)
IMM GRANULOCYTES NFR BLD AUTO: 1.1 % (ref 0–0.5)
INR PPP: 1.16 (ref 0.8–1.2)
LEFT ATRIUM VOLUME INDEX: 30.5 ML/M2
LYMPHOCYTES # BLD AUTO: 1.12 10*3/MM3 (ref 0.7–3.1)
LYMPHOCYTES NFR BLD AUTO: 13.3 % (ref 19.6–45.3)
MAGNESIUM SERPL-MCNC: 2.1 MG/DL (ref 1.6–2.4)
MCH RBC QN AUTO: 33.8 PG (ref 26.6–33)
MCH RBC QN AUTO: 36.2 PG (ref 26.6–33)
MCHC RBC AUTO-ENTMCNC: 32.1 G/DL (ref 31.5–35.7)
MCHC RBC AUTO-ENTMCNC: 35.1 G/DL (ref 31.5–35.7)
MCV RBC AUTO: 103 FL (ref 79–97)
MCV RBC AUTO: 105.5 FL (ref 79–97)
MONOCYTES # BLD AUTO: 0.98 10*3/MM3 (ref 0.1–0.9)
MONOCYTES NFR BLD AUTO: 11.6 % (ref 5–12)
NEUTROPHILS NFR BLD AUTO: 6.15 10*3/MM3 (ref 1.7–7)
NEUTROPHILS NFR BLD AUTO: 73 % (ref 42.7–76)
NRBC BLD AUTO-RTO: 0 /100 WBC (ref 0–0.2)
NT-PROBNP SERPL-MCNC: 1048 PG/ML (ref 0–900)
PLATELET # BLD AUTO: 115 10*3/MM3 (ref 140–450)
PLATELET # BLD AUTO: 124 10*3/MM3 (ref 140–450)
PMV BLD AUTO: 10.5 FL (ref 6–12)
PMV BLD AUTO: 8.8 FL (ref 6–12)
POTASSIUM SERPL-SCNC: 4.3 MMOL/L (ref 3.5–5.2)
POTASSIUM SERPL-SCNC: 4.6 MMOL/L (ref 3.5–5.2)
PROT SERPL-MCNC: 5.5 G/DL (ref 6–8.5)
PROTHROMBIN TIME: 14.7 SECONDS (ref 11.1–15.3)
RBC # BLD AUTO: 3.28 10*6/MM3 (ref 4.14–5.8)
RBC # BLD AUTO: 3.29 10*6/MM3 (ref 4.14–5.8)
SODIUM SERPL-SCNC: 139 MMOL/L (ref 136–145)
SODIUM SERPL-SCNC: 141 MMOL/L (ref 136–145)
TROPONIN T DELTA: 8 NG/L
TROPONIN T SERPL HS-MCNC: 46 NG/L
TSH SERPL DL<=0.05 MIU/L-ACNC: 2.29 UIU/ML (ref 0.27–4.2)
VIT B12 BLD-MCNC: 479 PG/ML (ref 211–946)
WBC NRBC COR # BLD: 11.88 10*3/MM3 (ref 3.4–10.8)
WBC NRBC COR # BLD: 8.42 10*3/MM3 (ref 3.4–10.8)
WHOLE BLOOD HOLD COAG: NORMAL

## 2023-09-21 PROCEDURE — 93005 ELECTROCARDIOGRAM TRACING: CPT

## 2023-09-21 PROCEDURE — C1894 INTRO/SHEATH, NON-LASER: HCPCS | Performed by: INTERNAL MEDICINE

## 2023-09-21 PROCEDURE — 33210 INSERT ELECTRD/PM CATH SNGL: CPT | Performed by: INTERNAL MEDICINE

## 2023-09-21 PROCEDURE — 74018 RADEX ABDOMEN 1 VIEW: CPT

## 2023-09-21 PROCEDURE — 83735 ASSAY OF MAGNESIUM: CPT | Performed by: EMERGENCY MEDICINE

## 2023-09-21 PROCEDURE — 93308 TTE F-UP OR LMTD: CPT | Performed by: INTERNAL MEDICINE

## 2023-09-21 PROCEDURE — 02HV33Z INSERTION OF INFUSION DEVICE INTO SUPERIOR VENA CAVA, PERCUTANEOUS APPROACH: ICD-10-PCS | Performed by: INTERNAL MEDICINE

## 2023-09-21 PROCEDURE — 93321 DOPPLER ECHO F-UP/LMTD STD: CPT

## 2023-09-21 PROCEDURE — 82746 ASSAY OF FOLIC ACID SERUM: CPT

## 2023-09-21 PROCEDURE — 71045 X-RAY EXAM CHEST 1 VIEW: CPT

## 2023-09-21 PROCEDURE — 84443 ASSAY THYROID STIM HORMONE: CPT | Performed by: EMERGENCY MEDICINE

## 2023-09-21 PROCEDURE — 83880 ASSAY OF NATRIURETIC PEPTIDE: CPT | Performed by: EMERGENCY MEDICINE

## 2023-09-21 PROCEDURE — 93005 ELECTROCARDIOGRAM TRACING: CPT | Performed by: EMERGENCY MEDICINE

## 2023-09-21 PROCEDURE — 82271 OCCULT BLOOD OTHER SOURCES: CPT | Performed by: INTERNAL MEDICINE

## 2023-09-21 PROCEDURE — 93325 DOPPLER ECHO COLOR FLOW MAPG: CPT | Performed by: INTERNAL MEDICINE

## 2023-09-21 PROCEDURE — 25510000001 PERFLUTREN (DEFINITY) 8.476 MG IN SODIUM CHLORIDE (PF) 0.9 % 10 ML INJECTION: Performed by: INTERNAL MEDICINE

## 2023-09-21 PROCEDURE — 93325 DOPPLER ECHO COLOR FLOW MAPG: CPT

## 2023-09-21 PROCEDURE — 80053 COMPREHEN METABOLIC PANEL: CPT | Performed by: EMERGENCY MEDICINE

## 2023-09-21 PROCEDURE — 25010000002 MORPHINE PER 10 MG: Performed by: INTERNAL MEDICINE

## 2023-09-21 PROCEDURE — 25010000002 HEPARIN (PORCINE) PER 1000 UNITS: Performed by: INTERNAL MEDICINE

## 2023-09-21 PROCEDURE — 93010 ELECTROCARDIOGRAM REPORT: CPT | Performed by: INTERNAL MEDICINE

## 2023-09-21 PROCEDURE — 5A1223Z PERFORMANCE OF CARDIAC PACING, CONTINUOUS: ICD-10-PCS | Performed by: INTERNAL MEDICINE

## 2023-09-21 PROCEDURE — 99223 1ST HOSP IP/OBS HIGH 75: CPT | Performed by: INTERNAL MEDICINE

## 2023-09-21 PROCEDURE — 99291 CRITICAL CARE FIRST HOUR: CPT

## 2023-09-21 PROCEDURE — 25010000002 LORAZEPAM PER 2 MG

## 2023-09-21 PROCEDURE — 25010000002 DOPAMINE PER 40 MG

## 2023-09-21 PROCEDURE — 85610 PROTHROMBIN TIME: CPT | Performed by: INTERNAL MEDICINE

## 2023-09-21 PROCEDURE — 82948 REAGENT STRIP/BLOOD GLUCOSE: CPT

## 2023-09-21 PROCEDURE — 25010000002 HALOPERIDOL LACTATE PER 5 MG: Performed by: INTERNAL MEDICINE

## 2023-09-21 PROCEDURE — 82607 VITAMIN B-12: CPT

## 2023-09-21 PROCEDURE — 93321 DOPPLER ECHO F-UP/LMTD STD: CPT | Performed by: INTERNAL MEDICINE

## 2023-09-21 PROCEDURE — B548ZZA ULTRASONOGRAPHY OF SUPERIOR VENA CAVA, GUIDANCE: ICD-10-PCS | Performed by: INTERNAL MEDICINE

## 2023-09-21 PROCEDURE — 25010000002 ATROPINE SULFATE: Performed by: EMERGENCY MEDICINE

## 2023-09-21 PROCEDURE — 85027 COMPLETE CBC AUTOMATED: CPT | Performed by: INTERNAL MEDICINE

## 2023-09-21 PROCEDURE — 63710000001 PREDNISONE PER 5 MG

## 2023-09-21 PROCEDURE — 93308 TTE F-UP OR LMTD: CPT

## 2023-09-21 PROCEDURE — 25010000002 ONDANSETRON PER 1 MG

## 2023-09-21 PROCEDURE — 99222 1ST HOSP IP/OBS MODERATE 55: CPT | Performed by: INTERNAL MEDICINE

## 2023-09-21 PROCEDURE — 25010000002 FENTANYL CITRATE (PF) 50 MCG/ML SOLUTION

## 2023-09-21 PROCEDURE — 84484 ASSAY OF TROPONIN QUANT: CPT | Performed by: EMERGENCY MEDICINE

## 2023-09-21 PROCEDURE — 25010000002 PROCHLORPERAZINE 10 MG/2ML SOLUTION: Performed by: EMERGENCY MEDICINE

## 2023-09-21 PROCEDURE — 85025 COMPLETE CBC W/AUTO DIFF WBC: CPT | Performed by: EMERGENCY MEDICINE

## 2023-09-21 RX ORDER — SODIUM CHLORIDE 0.9 % (FLUSH) 0.9 %
3 SYRINGE (ML) INJECTION EVERY 12 HOURS SCHEDULED
Status: DISCONTINUED | OUTPATIENT
Start: 2023-09-22 | End: 2023-09-22 | Stop reason: HOSPADM

## 2023-09-21 RX ORDER — FLUOXETINE 10 MG/1
10 CAPSULE ORAL DAILY
Status: DISCONTINUED | OUTPATIENT
Start: 2023-09-21 | End: 2023-09-25 | Stop reason: HOSPADM

## 2023-09-21 RX ORDER — INSULIN ASPART 100 [IU]/ML
0-14 INJECTION, SOLUTION INTRAVENOUS; SUBCUTANEOUS
Status: DISCONTINUED | OUTPATIENT
Start: 2023-09-21 | End: 2023-09-25 | Stop reason: HOSPADM

## 2023-09-21 RX ORDER — HALOPERIDOL 5 MG/ML
2 INJECTION INTRAMUSCULAR ONCE
Status: COMPLETED | OUTPATIENT
Start: 2023-09-21 | End: 2023-09-21

## 2023-09-21 RX ORDER — LIDOCAINE HYDROCHLORIDE 20 MG/ML
INJECTION, SOLUTION INFILTRATION; PERINEURAL
Status: DISCONTINUED | OUTPATIENT
Start: 2023-09-21 | End: 2023-09-21 | Stop reason: HOSPADM

## 2023-09-21 RX ORDER — ROPINIROLE 1 MG/1
1 TABLET, FILM COATED ORAL NIGHTLY
Status: DISCONTINUED | OUTPATIENT
Start: 2023-09-21 | End: 2023-09-25 | Stop reason: HOSPADM

## 2023-09-21 RX ORDER — BUMETANIDE 1 MG/1
2 TABLET ORAL DAILY
Status: DISCONTINUED | OUTPATIENT
Start: 2023-09-21 | End: 2023-09-25 | Stop reason: HOSPADM

## 2023-09-21 RX ORDER — SODIUM CHLORIDE 0.9 % (FLUSH) 0.9 %
10 SYRINGE (ML) INJECTION EVERY 12 HOURS SCHEDULED
Status: DISCONTINUED | OUTPATIENT
Start: 2023-09-21 | End: 2023-09-25 | Stop reason: HOSPADM

## 2023-09-21 RX ORDER — MORPHINE SULFATE 2 MG/ML
2 INJECTION, SOLUTION INTRAMUSCULAR; INTRAVENOUS EVERY 4 HOURS PRN
Status: DISCONTINUED | OUTPATIENT
Start: 2023-09-21 | End: 2023-09-25 | Stop reason: HOSPADM

## 2023-09-21 RX ORDER — HALOPERIDOL 5 MG/ML
3 INJECTION INTRAMUSCULAR ONCE
Status: DISCONTINUED | OUTPATIENT
Start: 2023-09-21 | End: 2023-09-25 | Stop reason: HOSPADM

## 2023-09-21 RX ORDER — HYDROXYCHLOROQUINE SULFATE 200 MG/1
200 TABLET, FILM COATED ORAL DAILY
Status: DISCONTINUED | OUTPATIENT
Start: 2023-09-21 | End: 2023-09-25 | Stop reason: HOSPADM

## 2023-09-21 RX ORDER — ACETAMINOPHEN 650 MG/1
650 SUPPOSITORY RECTAL EVERY 4 HOURS PRN
Status: DISCONTINUED | OUTPATIENT
Start: 2023-09-21 | End: 2023-09-25 | Stop reason: HOSPADM

## 2023-09-21 RX ORDER — PREDNISONE 5 MG/1
5 TABLET ORAL DAILY
Status: DISCONTINUED | OUTPATIENT
Start: 2023-09-21 | End: 2023-09-25 | Stop reason: HOSPADM

## 2023-09-21 RX ORDER — LOSARTAN POTASSIUM 50 MG/1
100 TABLET ORAL DAILY
Status: DISCONTINUED | OUTPATIENT
Start: 2023-09-21 | End: 2023-09-25 | Stop reason: HOSPADM

## 2023-09-21 RX ORDER — ONDANSETRON 2 MG/ML
4 INJECTION INTRAMUSCULAR; INTRAVENOUS EVERY 6 HOURS PRN
Status: DISCONTINUED | OUTPATIENT
Start: 2023-09-21 | End: 2023-09-22 | Stop reason: SDUPTHER

## 2023-09-21 RX ORDER — BUPIVACAINE HCL/0.9 % NACL/PF 0.1 %
2000 PLASTIC BAG, INJECTION (ML) EPIDURAL ONCE
Status: COMPLETED | OUTPATIENT
Start: 2023-09-22 | End: 2023-09-22

## 2023-09-21 RX ORDER — HYDRALAZINE HYDROCHLORIDE 20 MG/ML
20 INJECTION INTRAMUSCULAR; INTRAVENOUS EVERY 6 HOURS PRN
Status: DISCONTINUED | OUTPATIENT
Start: 2023-09-21 | End: 2023-09-25 | Stop reason: HOSPADM

## 2023-09-21 RX ORDER — SODIUM CHLORIDE 0.9 % (FLUSH) 0.9 %
10 SYRINGE (ML) INJECTION AS NEEDED
Status: DISCONTINUED | OUTPATIENT
Start: 2023-09-21 | End: 2023-09-25 | Stop reason: HOSPADM

## 2023-09-21 RX ORDER — ONDANSETRON 4 MG/1
4 TABLET, FILM COATED ORAL EVERY 6 HOURS PRN
Status: DISCONTINUED | OUTPATIENT
Start: 2023-09-21 | End: 2023-09-25 | Stop reason: HOSPADM

## 2023-09-21 RX ORDER — SODIUM CHLORIDE 9 MG/ML
40 INJECTION, SOLUTION INTRAVENOUS AS NEEDED
Status: DISCONTINUED | OUTPATIENT
Start: 2023-09-22 | End: 2023-09-22 | Stop reason: HOSPADM

## 2023-09-21 RX ORDER — ACETAMINOPHEN 325 MG/1
650 TABLET ORAL EVERY 4 HOURS PRN
Status: DISCONTINUED | OUTPATIENT
Start: 2023-09-21 | End: 2023-09-25 | Stop reason: HOSPADM

## 2023-09-21 RX ORDER — DOPAMINE HYDROCHLORIDE 160 MG/100ML
INJECTION, SOLUTION INTRAVENOUS
Status: COMPLETED
Start: 2023-09-21 | End: 2023-09-21

## 2023-09-21 RX ORDER — GLUCAGON 1 MG/ML
1 KIT INJECTION
Status: DISCONTINUED | OUTPATIENT
Start: 2023-09-21 | End: 2023-09-25 | Stop reason: HOSPADM

## 2023-09-21 RX ORDER — LORAZEPAM 0.5 MG/1
0.5 TABLET ORAL EVERY 8 HOURS PRN
Status: DISCONTINUED | OUTPATIENT
Start: 2023-09-21 | End: 2023-09-21

## 2023-09-21 RX ORDER — CHOLECALCIFEROL (VITAMIN D3) 125 MCG
5 CAPSULE ORAL NIGHTLY PRN
Status: DISCONTINUED | OUTPATIENT
Start: 2023-09-21 | End: 2023-09-25 | Stop reason: HOSPADM

## 2023-09-21 RX ORDER — SODIUM CHLORIDE 0.9 % (FLUSH) 0.9 %
10 SYRINGE (ML) INJECTION AS NEEDED
Status: CANCELLED | OUTPATIENT
Start: 2023-09-22

## 2023-09-21 RX ORDER — BUPIVACAINE HCL/0.9 % NACL/PF 0.1 %
2000 PLASTIC BAG, INJECTION (ML) EPIDURAL ONCE
Status: CANCELLED | OUTPATIENT
Start: 2023-09-22 | End: 2023-09-21

## 2023-09-21 RX ORDER — DEXTROSE MONOHYDRATE 25 G/50ML
25 INJECTION, SOLUTION INTRAVENOUS
Status: DISCONTINUED | OUTPATIENT
Start: 2023-09-21 | End: 2023-09-25 | Stop reason: HOSPADM

## 2023-09-21 RX ORDER — FENTANYL CITRATE 50 UG/ML
50 INJECTION, SOLUTION INTRAMUSCULAR; INTRAVENOUS ONCE
Status: COMPLETED | OUTPATIENT
Start: 2023-09-21 | End: 2023-09-21

## 2023-09-21 RX ORDER — DOPAMINE HYDROCHLORIDE 160 MG/100ML
2-20 INJECTION, SOLUTION INTRAVENOUS
Status: DISCONTINUED | OUTPATIENT
Start: 2023-09-21 | End: 2023-09-22

## 2023-09-21 RX ORDER — SODIUM CHLORIDE 0.9 % (FLUSH) 0.9 %
3 SYRINGE (ML) INJECTION EVERY 12 HOURS SCHEDULED
Status: CANCELLED | OUTPATIENT
Start: 2023-09-22

## 2023-09-21 RX ORDER — SODIUM CHLORIDE 9 MG/ML
40 INJECTION, SOLUTION INTRAVENOUS AS NEEDED
Status: DISCONTINUED | OUTPATIENT
Start: 2023-09-21 | End: 2023-09-25 | Stop reason: HOSPADM

## 2023-09-21 RX ORDER — MELATONIN
1000 DAILY
Status: DISCONTINUED | OUTPATIENT
Start: 2023-09-21 | End: 2023-09-25 | Stop reason: HOSPADM

## 2023-09-21 RX ORDER — AMLODIPINE BESYLATE 5 MG/1
5 TABLET ORAL
Status: DISCONTINUED | OUTPATIENT
Start: 2023-09-21 | End: 2023-09-25 | Stop reason: HOSPADM

## 2023-09-21 RX ORDER — ACETAMINOPHEN 160 MG/5ML
650 SOLUTION ORAL EVERY 4 HOURS PRN
Status: DISCONTINUED | OUTPATIENT
Start: 2023-09-21 | End: 2023-09-25 | Stop reason: HOSPADM

## 2023-09-21 RX ORDER — TAMSULOSIN HYDROCHLORIDE 0.4 MG/1
0.8 CAPSULE ORAL NIGHTLY
Status: DISCONTINUED | OUTPATIENT
Start: 2023-09-21 | End: 2023-09-25 | Stop reason: HOSPADM

## 2023-09-21 RX ORDER — SODIUM CHLORIDE 0.9 % (FLUSH) 0.9 %
10 SYRINGE (ML) INJECTION AS NEEDED
Status: DISCONTINUED | OUTPATIENT
Start: 2023-09-22 | End: 2023-09-22 | Stop reason: HOSPADM

## 2023-09-21 RX ORDER — HALOPERIDOL 5 MG/ML
3 INJECTION INTRAMUSCULAR ONCE
Status: COMPLETED | OUTPATIENT
Start: 2023-09-21 | End: 2023-09-21

## 2023-09-21 RX ORDER — NICOTINE POLACRILEX 4 MG
15 LOZENGE BUCCAL
Status: DISCONTINUED | OUTPATIENT
Start: 2023-09-21 | End: 2023-09-25 | Stop reason: HOSPADM

## 2023-09-21 RX ORDER — LORAZEPAM 2 MG/ML
0.5 INJECTION INTRAMUSCULAR
Status: DISCONTINUED | OUTPATIENT
Start: 2023-09-21 | End: 2023-09-21

## 2023-09-21 RX ORDER — MIDAZOLAM HYDROCHLORIDE 1 MG/ML
INJECTION INTRAMUSCULAR; INTRAVENOUS
Status: DISCONTINUED
Start: 2023-09-21 | End: 2023-09-21 | Stop reason: WASHOUT

## 2023-09-21 RX ORDER — SODIUM CHLORIDE 9 MG/ML
40 INJECTION, SOLUTION INTRAVENOUS AS NEEDED
Status: CANCELLED | OUTPATIENT
Start: 2023-09-22

## 2023-09-21 RX ORDER — FENTANYL CITRATE 50 UG/ML
INJECTION, SOLUTION INTRAMUSCULAR; INTRAVENOUS
Status: COMPLETED
Start: 2023-09-21 | End: 2023-09-21

## 2023-09-21 RX ORDER — PROCHLORPERAZINE EDISYLATE 5 MG/ML
10 INJECTION INTRAMUSCULAR; INTRAVENOUS ONCE
Status: COMPLETED | OUTPATIENT
Start: 2023-09-21 | End: 2023-09-21

## 2023-09-21 RX ORDER — HYDROCODONE BITARTRATE AND ACETAMINOPHEN 5; 325 MG/1; MG/1
1 TABLET ORAL EVERY 6 HOURS PRN
Status: DISCONTINUED | OUTPATIENT
Start: 2023-09-21 | End: 2023-09-25 | Stop reason: HOSPADM

## 2023-09-21 RX ADMIN — LOSARTAN POTASSIUM 100 MG: 50 TABLET, FILM COATED ORAL at 15:30

## 2023-09-21 RX ADMIN — AMLODIPINE BESYLATE 5 MG: 5 TABLET ORAL at 15:28

## 2023-09-21 RX ADMIN — ATROPINE SULFATE 1 MG: 0.1 INJECTION PARENTERAL at 03:23

## 2023-09-21 RX ADMIN — PROCHLORPERAZINE EDISYLATE 10 MG: 5 INJECTION INTRAMUSCULAR; INTRAVENOUS at 04:28

## 2023-09-21 RX ADMIN — ONDANSETRON 4 MG: 2 INJECTION INTRAMUSCULAR; INTRAVENOUS at 11:40

## 2023-09-21 RX ADMIN — HALOPERIDOL LACTATE 2 MG: 5 INJECTION, SOLUTION INTRAMUSCULAR at 09:26

## 2023-09-21 RX ADMIN — SODIUM CHLORIDE 8 MG/HR: 900 INJECTION INTRAVENOUS at 23:09

## 2023-09-21 RX ADMIN — PREDNISONE 5 MG: 5 TABLET ORAL at 15:30

## 2023-09-21 RX ADMIN — MORPHINE SULFATE 2 MG: 2 INJECTION, SOLUTION INTRAMUSCULAR; INTRAVENOUS at 23:27

## 2023-09-21 RX ADMIN — Medication 10 ML: at 08:51

## 2023-09-21 RX ADMIN — DOPAMINE HYDROCHLORIDE 2 MCG/KG/MIN: 160 INJECTION, SOLUTION INTRAVENOUS at 04:01

## 2023-09-21 RX ADMIN — PERFLUTREN 4 ML: 6.52 INJECTION, SUSPENSION INTRAVENOUS at 14:44

## 2023-09-21 RX ADMIN — MORPHINE SULFATE 2 MG: 2 INJECTION, SOLUTION INTRAMUSCULAR; INTRAVENOUS at 19:35

## 2023-09-21 RX ADMIN — BUMETANIDE 2 MG: 1 TABLET ORAL at 15:28

## 2023-09-21 RX ADMIN — LORAZEPAM 0.5 MG: 2 INJECTION INTRAMUSCULAR; INTRAVENOUS at 07:57

## 2023-09-21 RX ADMIN — HYDROXYCHLOROQUINE SULFATE 200 MG: 200 TABLET, FILM COATED ORAL at 15:29

## 2023-09-21 RX ADMIN — SODIUM CHLORIDE 8 MG/HR: 900 INJECTION INTRAVENOUS at 19:22

## 2023-09-21 RX ADMIN — FLUOXETINE 10 MG: 10 CAPSULE ORAL at 08:50

## 2023-09-21 RX ADMIN — HALOPERIDOL LACTATE 3 MG: 5 INJECTION, SOLUTION INTRAMUSCULAR at 13:05

## 2023-09-21 RX ADMIN — FENTANYL CITRATE 50 MCG: 50 INJECTION, SOLUTION INTRAMUSCULAR; INTRAVENOUS at 03:46

## 2023-09-21 RX ADMIN — RIVAROXABAN 15 MG: 15 TABLET, FILM COATED ORAL at 15:30

## 2023-09-21 RX ADMIN — Medication 1000 UNITS: at 15:29

## 2023-09-21 NOTE — H&P
[facility] Admission      Date of Admission: 9/21/2023      Primary Care Physician: Maisha Toure MD    Chief Complaint: Bradycardia    HPI: This gentleman presents today with bradycardia that was identified by family members who are nurses.  Patient has been feeling lightheaded and fatigued as well as restless in bed at night, which prompted the evaluation by his family members for a possible cause.  Patient has previously had an ablation for atrial fibrillation and has multiple comorbidities that could be an underlying cause.  Patient denies any other systemic symptoms on review.    In the emergency department, Dr. Mueller, used atropine to no avail.  She tried to externally pace the patient but got no capture and there was a medical malfunction of the pacing machine.  As result, she proceeded to start the patient on dopamine, which was modestly successful, however, ultimately cardiology were consulted and Dr. Doan has placed a temporary pacemaker.    Concurrent Medical History:  has a past medical history of Diabetic neuropathy, Elevated blood pressure, Hypercholesterolemia, Lupus erythematosus, Tobacco user, Type 2 diabetes mellitus with hyperglycemia, and Type 2 diabetes mellitus, uncontrolled.    Past Surgical History:  has a past surgical history that includes Cardiac catheterization (03/26/2014).    Family History: family history includes Diabetes in an other family member.     Social History:  reports that he has been smoking. He has never used smokeless tobacco. He reports that he does not drink alcohol.    Allergies: No Known Allergies    Medications:   Prior to Admission medications    Medication Sig Start Date End Date Taking? Authorizing Provider   bumetanide (BUMEX) 2 MG tablet Take 1 tablet by mouth Daily. Pt takes 1-3 tablets daily 4/24/17  Yes Provider, MD Brittany   FLUoxetine (PROzac) 10 MG capsule Take 1 capsule by mouth Daily.    Yes Brittany Rios MD   HumaLOG 100 UNIT/ML injection INJECT 150 UNITS DAILY VIA INSULIN PUMP (NEED TO BE SEEN) 5/14/21  Yes [facility] Hall APRN   HYDROcodone-acetaminophen (NORCO)  MG per tablet Take 1 tablet by mouth Every 8 (Eight) Hours As Needed. 11/10/16  Yes Brittany Rios MD   hydroxychloroquine (PLAQUENIL) 200 MG tablet Take 1 tablet by mouth Daily.   Yes Brittany Rios MD   losartan (COZAAR) 25 MG tablet Take 4 tablets by mouth Daily. 3/6/18  Yes Brittany Rios MD   metoprolol tartrate (LOPRESSOR) 25 MG tablet Take 1 tablet by mouth Daily. 9/16/17  Yes Brittany Rios MD   potassium chloride (K-DUR) 10 MEQ CR tablet Take 2 tablets by mouth Daily. 12/28/16  Yes Brittany Rios MD   predniSONE (DELTASONE) 10 MG tablet Take 0.5 tablets by mouth Daily. 3/15/17  Yes Brittany Rios MD   rOPINIRole (REQUIP) 1 MG tablet Take 1 tablet by mouth Every Night. Take 1 hour before bedtime.   Yes Brittany Rios MD   tamsulosin (FLOMAX) 0.4 MG capsule 24 hr capsule Take 2 capsules by mouth Every Night.   Yes Brittany Rios MD   vitamin D (ERGOCALCIFEROL) 1.25 MG (58082 UT) capsule capsule Take 1 capsule by mouth Every 7 (Seven) Days. 9/9/20  Yes [facility] Hall APRN   XARELTO 15 MG tablet Take 1 tablet by mouth Daily. 5/17/17  Yes Brittany Rios MD   ZETIA 10 MG tablet Take 1 tablet by mouth Daily. 12/28/16  Yes Brittany Rios MD   zolpidem (AMBIEN) 10 MG tablet Take 1 tablet by mouth At Night As Needed. 12/20/16  Yes Brittany Rios MD   amiodarone (PACERONE) 200 MG tablet Take 1 tablet by mouth 2 (Two) Times a Day. 5/17/17   Brittany Rios MD   cetirizine (ZyrTEC) 10 MG tablet Take 1 tablet by mouth Daily.    Brittany Rios MD   clindamycin (CLEOCIN) 300 MG capsule Take 1 capsule by mouth 2 (Two) Times a Day.  Patient not taking: Reported on 9/7/2023 6/2/17   Provider, Historical, MD   dapagliflozin  Propanediol 10 MG tablet Take  by mouth.    Brittany Rios MD   Glucagon (Baqsimi One Pack) 3 MG/DOSE powder 1 each into the nostril(s) as directed by provider As Needed (Hypoglycemia). Apply intranasal if hypoglycemia 9/7/23   [facility] Hall APRN   glucose blood (Accu-Chek Guide) test strip Test 6 times daily. DX CODE: E11.65 6/7/23   [facility] Hall APRN   glucose monitor monitoring kit INES CONTOUR NEXT METER.  USE TO TEST 4 TIMES A DAY.  DX-E11.8  Patient not taking: Reported on 9/7/2023 7/24/19   [facility] Hall APRN   Lancets misc Test 4 times daily , E11.649 5/6/22   [facility] Hlal APRN   Multiple Vitamins-Minerals (DAILY MULTIVITAMIN PO) Take 1 capsule by mouth daily.    Brittany Rios MD   nystatin (MYCOSTATIN) 546343 UNIT/GM powder Apply  topically to the appropriate area as directed 4 (Four) Times a Day.    Brittany Rios MD   Omega-3 Fatty Acids (FISH OIL) 1000 MG capsule capsule Take 1 capsule by mouth 2 (Two) Times a Day.    Brittany Rios MD   penicillin v potassium (VEETID) 500 MG tablet Take 1 tablet by mouth 4 (Four) Times a Day.    Brittany Rios MD   sulfamethoxazole-trimethoprim (BACTRIM DS,SEPTRA DS) 800-160 MG per tablet Take 1 tablet by mouth 2 (Two) Times a Day.    Brittany Rios MD       Review of Systems:  Review of Systems   Constitutional:  Positive for fatigue. Negative for chills, diaphoresis and fever.        Restless   HENT:  Negative for congestion, ear pain, rhinorrhea, sinus pressure, sneezing and sore throat.    Respiratory:  Negative for cough, shortness of breath, wheezing and stridor.    Cardiovascular:  Negative for chest pain.   Gastrointestinal:  Negative for abdominal distention, abdominal pain, constipation, diarrhea, nausea and vomiting.   Genitourinary:  Negative for difficulty urinating, dysuria, frequency, hematuria and urgency.   Musculoskeletal:  Negative for arthralgias, joint swelling and  myalgias.   Skin:  Negative for color change, rash and wound.   Neurological:  Positive for light-headedness. Negative for syncope and headaches.   Psychiatric/Behavioral:  Negative for agitation, confusion and hallucinations.     Otherwise complete ROS is negative except as mentioned above.    Physical Exam:   Temp:  [97.8 °F (36.6 °C)] 97.8 °F (36.6 °C)  Heart Rate:  [26-62] 60  Resp:  [16-20] 16  BP: (118-190)/(56-97) 190/58  Physical Exam  Vitals and nursing note reviewed.   Constitutional:       General: He is awake. He is not in acute distress.     Appearance: He is obese. He is ill-appearing. He is not toxic-appearing or diaphoretic.   HENT:      Head: Normocephalic and atraumatic.      Nose: Nose normal. No congestion or rhinorrhea.      Mouth/Throat:      Pharynx: Oropharynx is clear. No oropharyngeal exudate or posterior oropharyngeal erythema.   Eyes:      General:         Right eye: No discharge.         Left eye: No discharge.      Extraocular Movements: Extraocular movements intact.      Conjunctiva/sclera: Conjunctivae normal.      Pupils: Pupils are equal, round, and reactive to light.   Cardiovascular:      Rate and Rhythm: Normal rate and regular rhythm.      Pulses: Normal pulses.      Heart sounds: Normal heart sounds. Murmur heard.     No friction rub. No gallop.      Comments: Patient is currently being paced with an external pacemaker  Pulmonary:      Effort: Pulmonary effort is normal. No respiratory distress.      Breath sounds: Normal breath sounds. No stridor. No wheezing, rhonchi or rales.   Abdominal:      General: There is no distension.      Palpations: Abdomen is soft. There is no mass.      Hernia: No hernia is present.   Musculoskeletal:         General: No swelling.      Right lower leg: No edema.      Left lower leg: No edema.   Skin:     Capillary Refill: Capillary refill takes 2 to 3 seconds.      Coloration: Skin is not jaundiced.      Findings: No bruising, erythema or rash.    Neurological:      General: No focal deficit present.      Mental Status: He is alert and oriented to person, place, and time. Mental status is at baseline.      Cranial Nerves: No cranial nerve deficit.   Psychiatric:         Behavior: Behavior normal. Behavior is cooperative.         Thought Content: Thought content normal.         Judgment: Judgment normal.         Results Reviewed:  I have personally reviewed current lab, radiology, and data and agree with results.  Lab Results (last 24 hours)       Procedure Component Value Units Date/Time    Extra Tubes [[id number]] Collected: 09/21/23 0325    Specimen: Blood, Venous Line Updated: 09/21/23 0430    Narrative:      The following orders were created for panel order Extra Tubes.  Procedure                               Abnormality         Status                     ---------                               -----------         ------                     Gold Top - SST[[id number]]                                   Final result               Light Blue Top[[id number]]                                   Final result                 Please view results for these tests on the individual orders.    Gold Top - SST [[id number]] Collected: 09/21/23 0325    Specimen: Blood Updated: 09/21/23 0430     Extra Tube Hold for add-ons.     Comment: Auto resulted.       Light Blue Top [[id number]] Collected: 09/21/23 0325    Specimen: Blood Updated: 09/21/23 0430     Extra Tube Hold for add-ons.     Comment: Auto resulted       High Sensitivity Troponin T [[id number]]  (Abnormal) Collected: 09/21/23 0325    Specimen: Blood Updated: 09/21/23 0359     HS Troponin T 46 ng/L     Narrative:      High Sensitive Troponin T Reference Range:  <10.0 ng/L- Negative Female for AMI  <15.0 ng/L- Negative Male for AMI  >=10 - Abnormal Female indicating possible myocardial injury.  >=15 - Abnormal Male indicating possible myocardial injury.   Clinicians would have to utilize clinical acumen, EKG, Troponin,  and serial changes to determine if it is an Acute Myocardial Infarction or myocardial injury due to an underlying chronic condition.         TSH [[id number]]  (Normal) Collected: 09/21/23 0325    Specimen: Blood Updated: 09/21/23 0359     TSH 2.290 uIU/mL     BNP [[id number]]  (Abnormal) Collected: 09/21/23 0325    Specimen: Blood Updated: 09/21/23 0359     proBNP 1,048.0 pg/mL     Narrative:      Among patients with dyspnea, NT-proBNP is highly sensitive for the detection of acute congestive heart failure. In addition NT-proBNP of <300 pg/ml effectively rules out acute congestive heart failure with 99% negative predictive value.      Comprehensive Metabolic Panel [[id number]]  (Abnormal) Collected: 09/21/23 0325    Specimen: Blood Updated: 09/21/23 0353     Glucose 94 mg/dL      BUN 34 mg/dL      Creatinine 1.92 mg/dL      Sodium 141 mmol/L      Potassium 4.3 mmol/L      Chloride 105 mmol/L      CO2 27.0 mmol/L      Calcium 8.8 mg/dL      Total Protein 5.5 g/dL      Albumin 3.1 g/dL      ALT (SGPT) 12 U/L      AST (SGOT) 15 U/L      Alkaline Phosphatase 50 U/L      Total Bilirubin 0.2 mg/dL      Globulin 2.4 gm/dL      A/G Ratio 1.3 g/dL      BUN/Creatinine Ratio 17.7     Anion Gap 9.0 mmol/L      eGFR 36.6 mL/min/1.73     Narrative:      GFR Normal >60  Chronic Kidney Disease <60  Kidney Failure <15    The GFR formula is only valid for adults with stable renal function between ages 18 and 70.    Magnesium [[id number]]  (Normal) Collected: 09/21/23 0325    Specimen: Blood Updated: 09/21/23 0353     Magnesium 2.1 mg/dL     CBC & Differential [[id number]]  (Abnormal) Collected: 09/21/23 0325    Specimen: Blood Updated: 09/21/23 0345    Narrative:      The following orders were created for panel order CBC & Differential.  Procedure                               Abnormality         Status                     ---------                               -----------         ------                     CBC Auto  Differential[[id number]]        Abnormal            Final result               Scan Slide[[id number]]                                                                    Please view results for these tests on the individual orders.    CBC Auto Differential [[id number]]  (Abnormal) Collected: 09/21/23 0325    Specimen: Blood Updated: 09/21/23 0345     WBC 8.42 10*3/mm3      RBC 3.28 10*6/mm3      Hemoglobin 11.1 g/dL      Hematocrit 34.6 %      .5 fL      MCH 33.8 pg      MCHC 32.1 g/dL      RDW 14.1 %      RDW-SD 54.4 fl      MPV 8.8 fL      Platelets 124 10*3/mm3      Neutrophil % 73.0 %      Lymphocyte % 13.3 %      Monocyte % 11.6 %      Eosinophil % 0.6 %      Basophil % 0.4 %      Immature Grans % 1.1 %      Neutrophils, Absolute 6.15 10*3/mm3      Lymphocytes, Absolute 1.12 10*3/mm3      Monocytes, Absolute 0.98 10*3/mm3      Eosinophils, Absolute 0.05 10*3/mm3      Basophils, Absolute 0.03 10*3/mm3      Immature Grans, Absolute 0.09 10*3/mm3      nRBC 0.0 /100 WBC           Imaging Results (Last 24 Hours)       Procedure Component Value Units Date/Time    XR Chest 1 View [[id number]] Collected: 09/21/23 0339     Updated: 09/21/23 0342    Narrative:      XR CHEST 1 VIEW    HISTORY: bradycardia    COMPARISON: None.    FINDINGS:  Frontal view of the chest was obtained.    The lungs are clear.There are low lung volumes.  The cardiac silhouette is  enlarged. There is no significant pleural effusion or pneumothorax.    The osseous structures and surrounding soft tissues demonstrate no acute  abnormality.    Upper abdomen is unremarkable.        Impression:      1. No acute cardiopulmonary disease.                  Assessment:    Active Hospital Problems    Diagnosis     **Third degree heart block     Macrocytic anemia     Elevated troponin     Hypertension     Stage 3b chronic kidney disease          Medical Decision Making  Number and Complexity of problems: 5 - High  Differential Diagnosis: As  above    Conditions and Status:        Condition is at treatment goal.     [facility] Data  External documents reviewed: Chart review  My EKG interpretation: There is significant bradycardia with complete heart block   My plain film interpretation: Chest x-ray reveals no acute cardiopulmonary pathology  Tests considered but not ordered: Nil     Decision rules/scores evaluated (example [id number], Wells, etc): -     Discussed with: The patient     Treatment Plan  Symptomatic bradycardia  The patient has a temporary pacemaker that is capturing and maintaining a heart rate of 60 bpm.  Orders have been placed for as needed atropine.  Cardiology will continue to consult, as needed.  I will arrange an echocardiogram in the a.m. to ensure there is no structural component to the patient's heart failure.  I have left the dopamine drip on the patient's chart, although he should not needed at this time, in case the temporary pacemaker malfunctions.  For any agitation whilst the patient is being temporarily paced I have ordered low-dose IV Ativan.  Hypertension  Remarkably, the patient was hypertensive prior to the placement of the temporary pacemaker, even though he was frequently running heart rates of only 26 and 27 at times.  I will ensure that his home antihypertensives are reordered and we will have to optimize these to blood pressure goals.  Elevated troponin  We will continue to trend serial troponins and perform ECGs to ensure the patient does not have a cardiac component to his third-degree heart block/bradycardia.  At this time I have not initiated any treatment protocols as the patient denies any cardiac symptoms.  Microcytic anemia  I will arrange for the patient to have folate and vitamin B-12 levels  Stage IIIb chronic kidney disease  We will trend the patient's creatinine during admission and ensure that his creatinine remained stable.      Care Planning  Shared decision making: With the patient  Code status and  discussions: Full    Disposition  Social Determinants of Health that impact treatment or disposition: Nil  I expect the patient to be discharged to home in 2-5 days.       Jacinto Vargas MD    Electronically signed by Jacinto Vargas MD, 09/21/23, 6:37 AM CDT.

## 2023-09-21 NOTE — ED NOTES
Verbal order per Dr. Mueller, Read back and verified, increase dopamine by 5 mcg/kg/min at this time.

## 2023-09-21 NOTE — CONSULTS
Cardiology at [facility]  Cardiovascular Consultation Note      Jovan Hardwick  [id number]  1950    DATE OF ADMISSION: 9/21/2023  DATE OF CONSULTATION:  9/21/2023    Maisha Toure MD  Treatment Team:   Attending Provider: Jacinto Vargas MD  Consulting Physician: Poornima Doan MD  Consulting Physician: Jacinto Vargas MD  Surgeon: Poornima Doan MD  Consulting Physician: Elba Brown MD  Consulting Physician: Feliberto Corbett MD  Nurse Practitioner: Priscilla Cha APRN  Admitting Provider: Jacinto Vargas MD    Chief Complaint   Patient presents with    Slow Heart Rate    Dizziness       Chief complaint/ Reason for Consultation symptomatic complete heart block requiring transvenous temporary pacemaker      History of Present Illness  78 years old gentleman with concurrent medical problems of hypertension, diabetes, hyperlipidemia, history of lupus s/p EP study and A-fib ablation on Xarelto who presented to our facility for evaluations of slow heart rate.  The patient was bit restless and unable to sleep.  Family checked the heart rate was in the 20s and subsequently called the medical service and patient brought to the ER.  Patient was given IV fluid and given the symptomatic complete heart block underwent transvenous temporary pacemaker by Dr. Doan prior to that external pacemaker.  Patient currently pacing at rate of 60 bpm.  The patient was bit confused and a little bit agitated he received some IV sedation.  His grandson was present in the room who is a nurse by profession working the ER.  Information obtained from the nurse Dr. Doan and the family.  No chest pain was reported pain orthopnea PND.  On metoprolol for atrial fibrillation but currently in sinus rhythm and amiodarone    Echo 9/21/2023  Left ventricular systolic function is normal. Left ventricular ejection fraction appears to be 51 - 55%.    Left ventricular wall thickness is  consistent with mild concentric hypertrophy.    Left ventricular diastolic function was indeterminate.    The left atrial cavity is mildly dilated.    Estimated right ventricular systolic pressure from tricuspid regurgitation is normal (<35 mmHg).    The aortic valve is abnormal in structure. There is moderate calcification of the aortic valve. No significant aortic valve regurgitation is present. Mild aortic valve stenosis is present. Mild restriction to aortic valve opening.    Past Medical History:   Diagnosis Date    Diabetic neuropathy     Elevated blood pressure     Hypercholesterolemia     Lupus erythematosus     Tobacco user     Type 2 diabetes mellitus with hyperglycemia     Type 2 diabetes mellitus, uncontrolled        Past Surgical History:   Procedure Laterality Date    CARDIAC CATHETERIZATION  03/26/2014    No epicardial coronary artery disease. Normal LV systolic function. EF 55%. No wall motion abnormalities. Systemic hypertension.       No Known Allergies    No current facility-administered medications on file prior to encounter.     Current Outpatient Medications on File Prior to Encounter   Medication Sig Dispense Refill    bumetanide (BUMEX) 2 MG tablet Take 1 tablet by mouth Daily. Pt takes 1-3 tablets daily      FLUoxetine (PROzac) 10 MG capsule Take 1 capsule by mouth Daily.      HumaLOG 100 UNIT/ML injection INJECT 150 UNITS DAILY VIA INSULIN PUMP (NEED TO BE SEEN) 150 mL 2    HYDROcodone-acetaminophen (NORCO)  MG per tablet Take 1 tablet by mouth Every 8 (Eight) Hours As Needed.      hydroxychloroquine (PLAQUENIL) 200 MG tablet Take 1 tablet by mouth Daily.      losartan (COZAAR) 25 MG tablet Take 4 tablets by mouth Daily.  3    metoprolol tartrate (LOPRESSOR) 25 MG tablet Take 1 tablet by mouth Daily.      potassium chloride (K-DUR) 10 MEQ CR tablet Take 2 tablets by mouth Daily.      predniSONE (DELTASONE) 10 MG tablet Take 0.5 tablets by mouth Daily.      rOPINIRole (REQUIP) 1 MG  tablet Take 1 tablet by mouth Every Night. Take 1 hour before bedtime.      tamsulosin (FLOMAX) 0.4 MG capsule 24 hr capsule Take 2 capsules by mouth Every Night.      vitamin D (ERGOCALCIFEROL) 1.25 MG (73157 UT) capsule capsule Take 1 capsule by mouth Every 7 (Seven) Days. 4 capsule 5    XARELTO 15 MG tablet Take 1 tablet by mouth Daily.      ZETIA 10 MG tablet Take 1 tablet by mouth Daily.      zolpidem (AMBIEN) 10 MG tablet Take 1 tablet by mouth At Night As Needed.      amiodarone (PACERONE) 200 MG tablet Take 1 tablet by mouth 2 (Two) Times a Day.      cetirizine (ZyrTEC) 10 MG tablet Take 1 tablet by mouth Daily.      clindamycin (CLEOCIN) 300 MG capsule Take 1 capsule by mouth 2 (Two) Times a Day. (Patient not taking: Reported on 9/7/2023)      dapagliflozin Propanediol 10 MG tablet Take  by mouth.      Glucagon (Baqsimi One Pack) 3 MG/DOSE powder 1 each into the nostril(s) as directed by provider As Needed (Hypoglycemia). Apply intranasal if hypoglycemia 2 each 11    glucose blood (Accu-Chek Guide) test strip Test 6 times daily. DX CODE: E11.65 540 each 3    glucose monitor monitoring kit INES CONTOUR NEXT METER.  USE TO TEST 4 TIMES A DAY.  DX-E11.8 (Patient not taking: Reported on 9/7/2023) 1 each 11    Lancets misc Test 4 times daily , E11.649 360 each 3    Multiple Vitamins-Minerals (DAILY MULTIVITAMIN PO) Take 1 capsule by mouth daily.      nystatin (MYCOSTATIN) 288077 UNIT/GM powder Apply  topically to the appropriate area as directed 4 (Four) Times a Day.      Omega-3 Fatty Acids (FISH OIL) 1000 MG capsule capsule Take 1 capsule by mouth 2 (Two) Times a Day.      penicillin v potassium (VEETID) 500 MG tablet Take 1 tablet by mouth 4 (Four) Times a Day.      sulfamethoxazole-trimethoprim (BACTRIM DS,SEPTRA DS) 800-160 MG per tablet Take 1 tablet by mouth 2 (Two) Times a Day.         Social History     Socioeconomic History    Marital status:    Tobacco Use    Smoking status: Every Day      "Packs/day: 0.50     Years: 15.00     Pack years: 7.50     Types: Cigarettes    Smokeless tobacco: Never    Tobacco comments:     encouraged smoking cessation    Vaping Use    Vaping Use: Never used   Substance and Sexual Activity    Alcohol use: No    Drug use: Never           Review of Systems:   Constitutional:  no change in exercise tolerance.  Positive change in physical active  HENT: Denies any hearing loss, epistaxis  Eyes: No blurred  Respiratory:  Denies dyspnea with exertion, no cough or wheezing  Cardiovascular: See H&P  Gastrointestinal:  Denies change in bowel habits, dyspepsia, ulcer disease  Endocrine: Negative for cold intolerance, heat intolerance, polydipsia, polyphagia  Genitourinary: Negative.  Musculoskeletal: Denies any history of arthritic symptoms or back problems.   Skin:  Denies rashes or skin lesions.   Allergic/Immunologic: Negative.  Negative for environmental allergies  Neurological: Mildly confused and agitated  Hematological: No history of easy bruisability  Psychiatric/Behavioral: Denies any history of depression         Objective:     Vitals:    09/21/23 0929 09/21/23 1001 09/21/23 1109 09/21/23 1528   BP: 138/73 153/58 157/72 (!) 182/74   BP Location:       Patient Position:       Pulse: 62 62 62    Resp:       Temp:       TempSrc:       SpO2: 94% 95% 93%    Weight:       Height:         Body mass index is 32.72 kg/m².  Flowsheet Rows      Flowsheet Row First Filed Value   Admission Height 185.4 cm (73\") Documented at 09/21/2023 0322   Admission Weight 112 kg (248 lb) Documented at 09/21/2023 0322            Intake/Output Summary (Last 24 hours) at 9/21/2023 1624  Last data filed at 9/21/2023 1300  Gross per 24 hour   Intake --   Output 800 ml   Net -800 ml         Physical Exam:   Constitutional: Cooperative, alert and oriented, well-developed, well-nourished, in no acute distress.   Head: Normocephalic, conjunctivae are pink, thyroid is nonpalpable, no jugular vein " distention  Cardiovascular regular rate/rhythm, no S3, no pericardial rub  Pulmonary/Chest: Chest positive bilateral, good air entry, no rales, no wheezing  Abdominal: Abdomen soft, bowel sounds normoactive  Musculoskeletal: No deformities, positive peripheral pulses  Neurological: No gross motor or sensory deficits noted, affect appropriate.   Skin: Warm and dry to the touch, no apparent skin lesions or masses noted.   Psychiatric: Normal mood and affect. Behavior is normal.    Radiology Review    XR Chest 1 View   Final Result      XR Abdomen KUB   Final Result   1. Distended stomach.  There is air seen throughout large and small bowel loops.   Partial gastric outlet obstruction or gastroparesis not excluded.   2.  Several calcification seen in the lower left pelvis.  These may be   phleboliths.  I cannot clear the distal left ureter.               XR Chest AP   Final Result      XR Chest 1 View   Final Result   1. No acute cardiopulmonary disease.                Lab Results:  Lab Results (last 24 hours)       Procedure Component Value Units Date/Time    POC Glucose Once [[id number]]  (Normal) Collected: 09/21/23 1218    Specimen: Blood Updated: 09/21/23 1235     Glucose 109 mg/dL      Comment: : [id number] CAIO CHELSEAMeter ID: [id number]       POC Glucose Once [[id number]]  (Abnormal) Collected: 09/21/23 0857    Specimen: Blood Updated: 09/21/23 0917     Glucose 139 mg/dL      Comment: Result Not ConfirmedOperator: [id number] CAIO CHELSEAMeter ID: [id number]       High Sensitivity Troponin T 2Hr [[id number]]  (Abnormal) Collected: 09/21/23 0643    Specimen: Blood Updated: 09/21/23 0726     HS Troponin T 54 ng/L      Troponin T Delta 8 ng/L     Narrative:      High Sensitive Troponin T Reference Range:  <10.0 ng/L- Negative Female for AMI  <15.0 ng/L- Negative Male for AMI  >=10 - Abnormal Female indicating possible myocardial injury.  >=15 - Abnormal Male indicating possible myocardial injury.    Clinicians would have to utilize clinical acumen, EKG, Troponin, and serial changes to determine if it is an Acute Myocardial Infarction or myocardial injury due to an underlying chronic condition.         Vitamin B12 [[id number]] Collected: 09/21/23 0325    Specimen: Blood Updated: 09/21/23 0650    Folate [[id number]] Collected: 09/21/23 0325    Specimen: Blood Updated: 09/21/23 0650    Extra Tubes [[id number]] Collected: 09/21/23 0325    Specimen: Blood Updated: 09/21/23 0430    Narrative:      The following orders were created for panel order Extra Tubes.  Procedure                               Abnormality         Status                     ---------                               -----------         ------                     Gold Top - SST[[id number]]                                   Final result               Light Blue Top[[id number]]                                   Final result                 Please view results for these tests on the individual orders.    Gold Top - SST [[id number]] Collected: 09/21/23 0325    Specimen: Blood Updated: 09/21/23 0430     Extra Tube Hold for add-ons.     Comment: Auto resulted.       Light Blue Top [[id number]] Collected: 09/21/23 0325    Specimen: Blood Updated: 09/21/23 0430     Extra Tube Hold for add-ons.     Comment: Auto resulted       High Sensitivity Troponin T [[id number]]  (Abnormal) Collected: 09/21/23 0325    Specimen: Blood Updated: 09/21/23 0359     HS Troponin T 46 ng/L     Narrative:      High Sensitive Troponin T Reference Range:  <10.0 ng/L- Negative Female for AMI  <15.0 ng/L- Negative Male for AMI  >=10 - Abnormal Female indicating possible myocardial injury.  >=15 - Abnormal Male indicating possible myocardial injury.   Clinicians would have to utilize clinical acumen, EKG, Troponin, and serial changes to determine if it is an Acute Myocardial Infarction or myocardial injury due to an underlying chronic condition.         TSH [[id number]]  (Normal) Collected:  09/21/23 0325    Specimen: Blood Updated: 09/21/23 0359     TSH 2.290 uIU/mL     BNP [[id number]]  (Abnormal) Collected: 09/21/23 0325    Specimen: Blood Updated: 09/21/23 0359     proBNP 1,048.0 pg/mL     Narrative:      Among patients with dyspnea, NT-proBNP is highly sensitive for the detection of acute congestive heart failure. In addition NT-proBNP of <300 pg/ml effectively rules out acute congestive heart failure with 99% negative predictive value.      Comprehensive Metabolic Panel [[id number]]  (Abnormal) Collected: 09/21/23 0325    Specimen: Blood Updated: 09/21/23 0353     Glucose 94 mg/dL      BUN 34 mg/dL      Creatinine 1.92 mg/dL      Sodium 141 mmol/L      Potassium 4.3 mmol/L      Chloride 105 mmol/L      CO2 27.0 mmol/L      Calcium 8.8 mg/dL      Total Protein 5.5 g/dL      Albumin 3.1 g/dL      ALT (SGPT) 12 U/L      AST (SGOT) 15 U/L      Alkaline Phosphatase 50 U/L      Total Bilirubin 0.2 mg/dL      Globulin 2.4 gm/dL      A/G Ratio 1.3 g/dL      BUN/Creatinine Ratio 17.7     Anion Gap 9.0 mmol/L      eGFR 36.6 mL/min/1.73     Narrative:      GFR Normal >60  Chronic Kidney Disease <60  Kidney Failure <15    The GFR formula is only valid for adults with stable renal function between ages 18 and 70.    Magnesium [[id number]]  (Normal) Collected: 09/21/23 0325    Specimen: Blood Updated: 09/21/23 0353     Magnesium 2.1 mg/dL     CBC & Differential [[id number]]  (Abnormal) Collected: 09/21/23 0325    Specimen: Blood Updated: 09/21/23 0345    Narrative:      The following orders were created for panel order CBC & Differential.  Procedure                               Abnormality         Status                     ---------                               -----------         ------                     CBC Auto Differential[[id number]]        Abnormal            Final result               Scan Slide[[id number]]                                                                    Please view results for these  tests on the individual orders.    CBC Auto Differential [[id number]]  (Abnormal) Collected: 09/21/23 0325    Specimen: Blood Updated: 09/21/23 0345     WBC 8.42 10*3/mm3      RBC 3.28 10*6/mm3      Hemoglobin 11.1 g/dL      Hematocrit 34.6 %      .5 fL      MCH 33.8 pg      MCHC 32.1 g/dL      RDW 14.1 %      RDW-SD 54.4 fl      MPV 8.8 fL      Platelets 124 10*3/mm3      Neutrophil % 73.0 %      Lymphocyte % 13.3 %      Monocyte % 11.6 %      Eosinophil % 0.6 %      Basophil % 0.4 %      Immature Grans % 1.1 %      Neutrophils, Absolute 6.15 10*3/mm3      Lymphocytes, Absolute 1.12 10*3/mm3      Monocytes, Absolute 0.98 10*3/mm3      Eosinophils, Absolute 0.05 10*3/mm3      Basophils, Absolute 0.03 10*3/mm3      Immature Grans, Absolute 0.09 10*3/mm3      nRBC 0.0 /100 WBC             I personally viewed and interpreted the patient's EKG/Telemetry data       Assessment/Plan:   Symptomatic complete heart block    72 years old patient with history of paroxysmal atrial fibrillation s/p EP study and A-fib ablation  Patient was on beta-blocker and amiodarone and family noted significant bradyarrhythmia slow heart rate brought to the ER noted to be in complete heart block requiring external pacemaker and subsequent emergent transvenous temporary pacemaker by Dr. Doan.  Currently he is a pacing at rate of 60 bpm minute.  Patient with agitated.  Xarelto and beta-blockers were placed on hold.  Procedure and risk regarding dual-chamber pacemaker implantation discussed with the patient the family.  Risk included but not limited to infection, bleeding, stroke, pneumothorax, hematoma, lead dislodgment, cardiac perforation.  Given the patient clinical condition will schedule with anesthesia tomorrow is scheduled at noon time.  Risk range less than 1 to 7%.    #2 hypertension  .  To hold antihypertensive medication    #3 diabetes management as per family dog    4 paroxysmal atrial fibrillation currently sinus rhythm with  a prior history of EP study and A-fib ablations.  He has elevated [id number]-UQTq score at 3 resume the anticoagulation after the procedure  Preventive  Class I obesity with BMI of 30-32.9    Risk factor lifestyle modification discussed             This document has been electronically signed by Elba Brown MD on September 21, 2023 16:32 CDT      Thank you for allowing me to participate in the care of Jovan Hardwick. Feel free to contact me directly with any further questions or concerns.    Elba Brown MD  09/21/23  16:24 CDT.      Part of this note may be an electronic transcription/translation of spoken language to printed text using the Dragon Dictation system.

## 2023-09-21 NOTE — CONSULTS
[facility]  HISTORY AND PHYSICAL  Jovan Hardwick  72 y.o. male    Chief complaint -  Dizziness.    History of Present Illness:    This is a 72-year-old gentleman with prior history of diabetes, hypertension, hyperlipidemia, lupus, atrial fibrillation status post ablation, on Xarelto for anticoagulation who presented with significant bradycardia at home.  Patient was restless and could not go to sleep.  Family checked his heart rate was in the 20s, MS was called, he was hypertensive so was brought to the ER.  Patient was given IV fluids started on external pacemaker without significant capture.  Subsequently patient started on dopamine with some improvement in his heart rate.  On my visit he was lying comfortably in bed, appears mildly restless but denying any chest pain.  Patient is on metoprolol at home for A-fib.          No Known Allergies      Past Medical History:   Diagnosis Date    Diabetic neuropathy     Elevated blood pressure     Hypercholesterolemia     Lupus erythematosus     Tobacco user     Type 2 diabetes mellitus with hyperglycemia     Type 2 diabetes mellitus, uncontrolled          Past Surgical History:   Procedure Laterality Date    CARDIAC CATHETERIZATION  03/26/2014    No epicardial coronary artery disease. Normal LV systolic function. EF 55%. No wall motion abnormalities. Systemic hypertension.         Family History   Problem Relation Age of Onset    Diabetes Other          Social History     Socioeconomic History    Marital status:    Tobacco Use    Smoking status: Every Day    Smokeless tobacco: Never    Tobacco comments:     encouraged smoking cessation    Substance and Sexual Activity    Alcohol use: No         Prior to Admission medications    Medication Sig Start Date End Date Taking? Authorizing Provider   bumetanide (BUMEX) 2 MG tablet Take 1 tablet by mouth Daily. Pt takes 1-3 tablets daily 4/24/17  Yes Provider, MD Brittany   FLUoxetine (PROzac)  10 MG capsule Take 1 capsule by mouth Daily.   Yes Brittany Rios MD   HumaLOG 100 UNIT/ML injection INJECT 150 UNITS DAILY VIA INSULIN PUMP (NEED TO BE SEEN) 5/14/21  Yes Casa Hall APRN   HYDROcodone-acetaminophen (NORCO)  MG per tablet Take 1 tablet by mouth Every 8 (Eight) Hours As Needed. 11/10/16  Yes Brittany Rios MD   hydroxychloroquine (PLAQUENIL) 200 MG tablet Take 1 tablet by mouth Daily.   Yes Brittany Rios MD   losartan (COZAAR) 25 MG tablet Take 4 tablets by mouth Daily. 3/6/18  Yes Brittany Rios MD   metoprolol tartrate (LOPRESSOR) 25 MG tablet Take 1 tablet by mouth Daily. 9/16/17  Yes Brittany Rios MD   potassium chloride (K-DUR) 10 MEQ CR tablet Take 2 tablets by mouth Daily. 12/28/16  Yes Brittany Rios MD   predniSONE (DELTASONE) 10 MG tablet Take 0.5 tablets by mouth Daily. 3/15/17  Yes Brittany Rios MD   rOPINIRole (REQUIP) 1 MG tablet Take 1 tablet by mouth Every Night. Take 1 hour before bedtime.   Yes Brittany Rios MD   tamsulosin (FLOMAX) 0.4 MG capsule 24 hr capsule Take 2 capsules by mouth Every Night.   Yes Brittany Rios MD   vitamin D (ERGOCALCIFEROL) 1.25 MG (21158 UT) capsule capsule Take 1 capsule by mouth Every 7 (Seven) Days. 9/9/20  Yes Casa Hall APRN   XARELTO 15 MG tablet Take 1 tablet by mouth Daily. 5/17/17  Yes Brittany Rios MD   ZETIA 10 MG tablet Take 1 tablet by mouth Daily. 12/28/16  Yes Brittany Rios MD   zolpidem (AMBIEN) 10 MG tablet Take 1 tablet by mouth At Night As Needed. 12/20/16  Yes Brittany Rios MD   amiodarone (PACERONE) 200 MG tablet Take 1 tablet by mouth 2 (Two) Times a Day. 5/17/17   Brittany Rios MD   cetirizine (ZyrTEC) 10 MG tablet Take 1 tablet by mouth Daily.    Brittany Rios MD   clindamycin (CLEOCIN) 300 MG capsule Take 1 capsule by mouth 2 (Two) Times a Day.  Patient not taking: Reported on 9/7/2023 6/2/17    Brittany Rios MD   dapagliflozin Propanediol 10 MG tablet Take  by mouth.    Brittany Rios MD   Glucagon (Baqsimi One Pack) 3 MG/DOSE powder 1 each into the nostril(s) as directed by provider As Needed (Hypoglycemia). Apply intranasal if hypoglycemia 9/7/23   Casa Hall APRN   glucose blood (Accu-Chek Guide) test strip Test 6 times daily. DX CODE: E11.65 6/7/23   Casa Hall APRN   glucose monitor monitoring kit INES CONTOUR NEXT METER.  USE TO TEST 4 TIMES A DAY.  DX-E11.8  Patient not taking: Reported on 9/7/2023 7/24/19   Casa Hall APRN   Lancets misc Test 4 times daily , E11.649 5/6/22   Casa Hall APRN   Multiple Vitamins-Minerals (DAILY MULTIVITAMIN PO) Take 1 capsule by mouth daily.    Brittany Rios MD   nystatin (MYCOSTATIN) 625965 UNIT/GM powder Apply  topically to the appropriate area as directed 4 (Four) Times a Day.    Brittany Rios MD   Omega-3 Fatty Acids (FISH OIL) 1000 MG capsule capsule Take 1 capsule by mouth 2 (Two) Times a Day.    Brittany Rios MD   penicillin v potassium (VEETID) 500 MG tablet Take 1 tablet by mouth 4 (Four) Times a Day.    Brittany Rios MD   sulfamethoxazole-trimethoprim (BACTRIM DS,SEPTRA DS) 800-160 MG per tablet Take 1 tablet by mouth 2 (Two) Times a Day.    Brittany Rios MD         Review of Systems:     Constitution: Denies any fatigue, fever or chills.  HENT: Denies any headache, hearing impairment.  Eyes: Denies any blurring of vision, or photophobia.  Cardiovascular:  As per history of present illness.   Respiratory system: Denies any COPD, shortness of breath.  Endocrine:  No history of hyperlipidemia, diabetes.  Musculoskeletal:  No history of arthritis with musculoskeletal problems.  Gastrointestinal: No nausea, vomiting, or melena.  Genitourinary: No dysuria or hematuria.  Neurological: No history of seizure disorder, stroke, or memory  "problems.    Psychiatric/Behavioral: No history of depression, bipolar disorder or schizophrenia .    Hematological: No history of easy bruising.    ROS          OBJECTIVE:    /60 (BP Location: Right arm)   Pulse (!) 31   Temp 97.8 °F (36.6 °C) (Oral)   Resp 18   Ht 185.4 cm (73\")   Wt 112 kg (248 lb)   SpO2 96%   BMI 32.72 kg/m²       Physical Exam:   Constitutional: Patient is oriented to person, place, and time.   Skin: Warm and dry.  Well developed and nourished in no acute distress .  Head: Normocephalic and atraumatic.   Eyes: Pupils are equal, round, and reactive to light.   Neck: Neck supple. No bruit in the carotids, no elevation of JVD.  Cardiovascular: S1 plus S2, bradycardic, systolic murmur audible.  Pulmonary/Chest: Air entry is equal on both sides.  No wheezing or crackles.  Abdominal: Soft.  No hepatosplenomegaly, bowel sounds are present.  Musculoskeletal: No kyphoscoliosis.  Neurological: Alert and oriented to person, place, and time.  Cranial nerves are intact. No motor or sensory deficit.  Extremities: No edema, no radial femoral delay.  Psychiatric: Normal mood and affect. Behavior is normal.      Lab Results (last 24 hours)       Procedure Component Value Units Date/Time    CBC & Differential [[id number]]  (Abnormal) Collected: 09/21/23 0325    Specimen: Blood Updated: 09/21/23 0345    Narrative:      The following orders were created for panel order CBC & Differential.  Procedure                               Abnormality         Status                     ---------                               -----------         ------                     CBC Auto Differential[[id number]]        Abnormal            Final result               Scan Slide[[id number]]                                                                    Please view results for these tests on the individual orders.    Comprehensive Metabolic Panel [[id number]]  (Abnormal) Collected: 09/21/23 0325    Specimen: Blood Updated: " 09/21/23 0353     Glucose 94 mg/dL      BUN 34 mg/dL      Creatinine 1.92 mg/dL      Sodium 141 mmol/L      Potassium 4.3 mmol/L      Chloride 105 mmol/L      CO2 27.0 mmol/L      Calcium 8.8 mg/dL      Total Protein 5.5 g/dL      Albumin 3.1 g/dL      ALT (SGPT) 12 U/L      AST (SGOT) 15 U/L      Alkaline Phosphatase 50 U/L      Total Bilirubin 0.2 mg/dL      Globulin 2.4 gm/dL      A/G Ratio 1.3 g/dL      BUN/Creatinine Ratio 17.7     Anion Gap 9.0 mmol/L      eGFR 36.6 mL/min/1.73     Narrative:      GFR Normal >60  Chronic Kidney Disease <60  Kidney Failure <15    The GFR formula is only valid for adults with stable renal function between ages 18 and 70.    Magnesium [[id number]]  (Normal) Collected: 09/21/23 0325    Specimen: Blood Updated: 09/21/23 0353     Magnesium 2.1 mg/dL     TSH [[id number]]  (Normal) Collected: 09/21/23 0325    Specimen: Blood Updated: 09/21/23 0359     TSH 2.290 uIU/mL     BNP [[id number]]  (Abnormal) Collected: 09/21/23 0325    Specimen: Blood Updated: 09/21/23 0359     proBNP 1,048.0 pg/mL     Narrative:      Among patients with dyspnea, NT-proBNP is highly sensitive for the detection of acute congestive heart failure. In addition NT-proBNP of <300 pg/ml effectively rules out acute congestive heart failure with 99% negative predictive value.      High Sensitivity Troponin T [[id number]]  (Abnormal) Collected: 09/21/23 0325    Specimen: Blood Updated: 09/21/23 0359     HS Troponin T 46 ng/L     Narrative:      High Sensitive Troponin T Reference Range:  <10.0 ng/L- Negative Female for AMI  <15.0 ng/L- Negative Male for AMI  >=10 - Abnormal Female indicating possible myocardial injury.  >=15 - Abnormal Male indicating possible myocardial injury.   Clinicians would have to utilize clinical acumen, EKG, Troponin, and serial changes to determine if it is an Acute Myocardial Infarction or myocardial injury due to an underlying chronic condition.         CBC Auto Differential [[id number]]   (Abnormal) Collected: 09/21/23 0325    Specimen: Blood Updated: 09/21/23 0345     WBC 8.42 10*3/mm3      RBC 3.28 10*6/mm3      Hemoglobin 11.1 g/dL      Hematocrit 34.6 %      .5 fL      MCH 33.8 pg      MCHC 32.1 g/dL      RDW 14.1 %      RDW-SD 54.4 fl      MPV 8.8 fL      Platelets 124 10*3/mm3      Neutrophil % 73.0 %      Lymphocyte % 13.3 %      Monocyte % 11.6 %      Eosinophil % 0.6 %      Basophil % 0.4 %      Immature Grans % 1.1 %      Neutrophils, Absolute 6.15 10*3/mm3      Lymphocytes, Absolute 1.12 10*3/mm3      Monocytes, Absolute 0.98 10*3/mm3      Eosinophils, Absolute 0.05 10*3/mm3      Basophils, Absolute 0.03 10*3/mm3      Immature Grans, Absolute 0.09 10*3/mm3      nRBC 0.0 /100 WBC                 The ASCVD Risk score (Miya CARDENAS, et al., 2019) failed to calculate for the following reasons:    Cannot find a previous HDL lab    Cannot find a previous total cholesterol lab          A/P:    1.  High degree AV block:  EKG consistent with third-degree AV block with A-V dissociation.  Prior history of A-fib ablation and on metoprolol at home.  Hemodynamically unstable on presentation.  Blood pressure improved with IV fluids ,, however remained persistently bradycardic and appeared restless.  He is also has JOSE probably from poor perfusion.  We will hold beta-blocker.  We will check TSH  We will get an echocardiogram  We will plan on temporary pacemaker via right IJ.  Risks of vessel injury, carotid injury, pneumothorax and injury to the RV were discussed with the patient and family.  Increased risk of bleeding since he has been on Xarelto however benefits outweigh risk.  Plan on permanent pacemaker in the morning.    2.  Atrial fibrillation: Prior history of A-fib.  [id number] score is elevated at 3.  On anticoagulation with Xarelto.  Currently in sinus rhythm with third-degree AV block.  We will hold Xarelto  We will hold AV franny blocking agents.    3.  Hypertension: Holding  antihypertensives  4.  Diabetes: Per medicine team.    Thank you for the consult we will continue to follow    BMI is >= 30 and <35. (Class 1 Obesity). The following options were offered after discussion;: exercise counseling/recommendations      Jovan Hardwick  reports that he has been smoking. He has never used smokeless tobacco..  Poornima Doan MD  9/21/2023  04:29 CDT      Part of this note may be an electronic transcription/translation of spoken language to printed text using the Dragon Dictation System.

## 2023-09-21 NOTE — BRIEF OP NOTE
Patient underwent successful placement of temporary venous pacemaker via right IJ approach without any complications.    TVP was sutured in place at 35 cm.  Excess full capture obtained, left at VVI at the back rate of 60, output of 5 with sensitivity 1.5    No complications    We will get an x-ray  Plan on permanent pacemaker in the morning  N.p.o.  Admit to CCU

## 2023-09-21 NOTE — ED NOTES
Attempted to pace pt multiple times, unable to pace pt.     At bedside:  ANTONINO Mloina RN Dr. Williams Britney RN

## 2023-09-21 NOTE — ED PROVIDER NOTES
Subjective   History of Present Illness  72-year-old male presents the emergency department via EMS from home after onset of feeling lightheaded and fatigued.  Medical family found him to be significantly bradycardic.  No history of pacemaker.  History of hypertension and diabetes as well as chronic kidney disease.  He also has history of hyperlipidemia and lupus.  On Xarelto for previous A-fib status post ablation.  Denies chest pain or shortness of breath.  No fever, chills, urinary symptoms, cough or congestion.     Family history, surgical history, social history, current medications and allergies are reviewed with the patient and triage documentation and vitals are reviewed.     History provided by:  Patient   used: No      Review of Systems   Constitutional:  Positive for fatigue. Negative for chills, diaphoresis and fever.   HENT:  Negative for congestion and sore throat.    Eyes:  Negative for photophobia and visual disturbance.   Respiratory:  Negative for cough, shortness of breath and wheezing.    Cardiovascular:  Negative for chest pain, palpitations and leg swelling.   Gastrointestinal:  Positive for nausea. Negative for abdominal pain and vomiting.   Endocrine: Negative for polydipsia, polyphagia and polyuria.   Genitourinary:  Negative for dysuria, frequency and urgency.   Musculoskeletal:  Negative for arthralgias, back pain, myalgias and neck pain.   Skin:  Negative for rash and wound.   Allergic/Immunologic: Negative.    Neurological:  Positive for light-headedness. Negative for weakness, numbness and headaches.   Hematological: Negative.    Psychiatric/Behavioral: Negative.       Past Medical History:   Diagnosis Date    Diabetic neuropathy     Elevated blood pressure     Hypercholesterolemia     Lupus erythematosus     Tobacco user     Type 2 diabetes mellitus with hyperglycemia     Type 2 diabetes mellitus, uncontrolled        No Known Allergies    Past Surgical History:    Procedure Laterality Date    CARDIAC CATHETERIZATION  03/26/2014    No epicardial coronary artery disease. Normal LV systolic function. EF 55%. No wall motion abnormalities. Systemic hypertension.       Family History   Problem Relation Age of Onset    Diabetes Other        Social History     Socioeconomic History    Marital status:    Tobacco Use    Smoking status: Every Day    Smokeless tobacco: Never    Tobacco comments:     encouraged smoking cessation    Substance and Sexual Activity    Alcohol use: No           Objective   Physical Exam  Vitals and nursing note reviewed.   Constitutional:       Appearance: He is obese. He is ill-appearing. He is not diaphoretic.   HENT:      Head: Normocephalic.      Mouth/Throat:      Mouth: Mucous membranes are moist.   Eyes:      General: No scleral icterus.     Conjunctiva/sclera: Conjunctivae normal.      Pupils: Pupils are equal, round, and reactive to light.   Cardiovascular:      Rate and Rhythm: Regular rhythm. Bradycardia present.      Pulses: Normal pulses.      Heart sounds: No murmur heard.  Pulmonary:      Effort: Pulmonary effort is normal. No respiratory distress.      Breath sounds: Normal breath sounds. No wheezing or rhonchi.   Abdominal:      General: Bowel sounds are normal.      Palpations: Abdomen is soft.      Tenderness: There is no abdominal tenderness. There is no guarding or rebound.   Musculoskeletal:         General: Normal range of motion.      Cervical back: Normal range of motion and neck supple.      Right lower leg: No edema.      Left lower leg: No edema.   Skin:     Capillary Refill: Capillary refill takes 2 to 3 seconds.      Coloration: Skin is pale.   Neurological:      General: No focal deficit present.      Mental Status: He is alert and oriented to person, place, and time.      Cranial Nerves: No cranial nerve deficit.      Motor: No weakness.   Psychiatric:         Mood and Affect: Mood normal.         Behavior: Behavior  normal.       ECG 12 Lead      Date/Time: 9/21/2023 3:14 AM  Performed by: Jatinder Mueller DO  Authorized by: Jatinder Mueller DO   Interpreted by physician  Comments: EKG September 21, 2023 at 0 314 reveals third-degree heart block at a rate of 28 bpm.  No obvious ST changes.  QTc 380.  Right bundle branch block.             ED Course      Labs Reviewed   COMPREHENSIVE METABOLIC PANEL - Abnormal; Notable for the following components:       Result Value    BUN 34 (*)     Creatinine 1.92 (*)     Total Protein 5.5 (*)     Albumin 3.1 (*)     eGFR 36.6 (*)     All other components within normal limits    Narrative:     GFR Normal >60  Chronic Kidney Disease <60  Kidney Failure <15    The GFR formula is only valid for adults with stable renal function between ages 18 and 70.   BNP (IN-HOUSE) - Abnormal; Notable for the following components:    proBNP 1,048.0 (*)     All other components within normal limits    Narrative:     Among patients with dyspnea, NT-proBNP is highly sensitive for the detection of acute congestive heart failure. In addition NT-proBNP of <300 pg/ml effectively rules out acute congestive heart failure with 99% negative predictive value.     TROPONIN - Abnormal; Notable for the following components:    HS Troponin T 46 (*)     All other components within normal limits    Narrative:     High Sensitive Troponin T Reference Range:  <10.0 ng/L- Negative Female for AMI  <15.0 ng/L- Negative Male for AMI  >=10 - Abnormal Female indicating possible myocardial injury.  >=15 - Abnormal Male indicating possible myocardial injury.   Clinicians would have to utilize clinical acumen, EKG, Troponin, and serial changes to determine if it is an Acute Myocardial Infarction or myocardial injury due to an underlying chronic condition.        CBC WITH AUTO DIFFERENTIAL - Abnormal; Notable for the following components:    RBC 3.28 (*)     Hemoglobin 11.1 (*)     Hematocrit 34.6 (*)     .5 (*)     MCH 33.8  (*)     RDW-SD 54.4 (*)     Platelets 124 (*)     Lymphocyte % 13.3 (*)     Immature Grans % 1.1 (*)     Monocytes, Absolute 0.98 (*)     Immature Grans, Absolute 0.09 (*)     All other components within normal limits   MAGNESIUM - Normal   TSH - Normal   HIGH SENSITIVITIY TROPONIN T 2HR   CBC AND DIFFERENTIAL    Narrative:     The following orders were created for panel order CBC & Differential.  Procedure                               Abnormality         Status                     ---------                               -----------         ------                     CBC Auto Differential[[id number]]        Abnormal            Final result               Scan Slide[[id number]]                                                                    Please view results for these tests on the individual orders.   EXTRA TUBES    Narrative:     The following orders were created for panel order Extra Tubes.  Procedure                               Abnormality         Status                     ---------                               -----------         ------                     Gold Top - SST[[id number]]                                   Final result               Light Blue Top[[id number]]                                   Final result                 Please view results for these tests on the individual orders.   GOLD TOP - SST   LIGHT BLUE TOP     XR Chest 1 View    Result Date: 9/21/2023  Narrative: XR CHEST 1 VIEW HISTORY: bradycardia COMPARISON: None. FINDINGS: Frontal view of the chest was obtained. The lungs are clear.There are low lung volumes.  The cardiac silhouette is enlarged. There is no significant pleural effusion or pneumothorax. The osseous structures and surrounding soft tissues demonstrate no acute abnormality. Upper abdomen is unremarkable.     Impression: 1. No acute cardiopulmonary disease.     EP/CRM Study    Result Date: 9/21/2023  Narrative: Placement of temporary venous pacemaker. : Poornima  MD Franki., FACC, FACP, [facility] RPVI INDICATION: Third-degree heart block Procedure:  After risks, benefits, and alternatives of the above-mentioned procedure were explained to the patient  detail, informed consent was obtained both verbally and in writing. The patient was taken to Cardiac Catheterization Suite where the RUE was prepped and draped in the usual sterile fashion. The right internal jugular region was prepped and draped in the usual sterile fashion. 2% lidocaine solution was used to infiltrate the skin overlying the right internal jugular vein. Once adequate anesthesia has been obtained, a thin-walled #18 gauge Argon needle was used to cannulate the right internal jugular vein with ultrasound guidance. A steel guidewire was then inserted through the needle into the vessel without resistance. Small nick was then made in the skin and the needle was removed. An 6 Sami venous sheath was then advanced over the guidewire into the vascular lumen without resistance. The guidewire and dilator were then removed. The sheath was then flushed.  A balloontipped temporary venous pacemaker wire was advanced into the RV successful capture. Patient tolerated the procedure well.  Surgical sheath in place. TVP was sutured in place at 35 cm. Complications: None Blood loss: Less than 5 mL Specimen: None Assessment: Access with placement of TVP via right IJ Recommendations: Chest x-ray Admit to CCU Permanent pacemaker in the morning        Medical Decision Making  Amount and/or Complexity of Data Reviewed  Labs: ordered.  Radiology: ordered.  ECG/medicine tests: ordered.    Risk  Prescription drug management.    0355 EKG to     0357 Recommends starting Dopamine    0359 Decision to activate cath team for emergent pacer, per Dr. Doan, contacted HS and .     0405 Spoke with Dr. Vargas, hospitalist for medical management/admission    Patient was found to be in third-degree heart block.  Atropine did not help.   Electrical complications with trying to pace and we could not capture and the ECG tracings Failing.  Spoke with Dr. Doan  who recommended dopamine and to call in the cath team for urgent pacemaker.  Patient and family are advised of need for admission and intervention and they acknowledge understanding and agreeable.    Final diagnoses:   Third degree heart block        ED Disposition  ED Disposition       ED Disposition   Decision to Admit    Condition   --    Comment   Level of Care: Critical Care [6]   Diagnosis: Third degree heart block [608827]   Admitting Physician: MARLENA ARNOLD [[id number]]   Certification: I Certify That Inpatient Hospital Services Are Medically Necessary For Greater Than 2 Midnights                 No follow-up provider specified.       Medication List      No changes were made to your prescriptions during this visit.            Jatinder Mueller,   09/21/23 0600

## 2023-09-21 NOTE — Clinical Note
A 4 fr sheath was  inserted using micropuncture technique into the right subclavian vein. Sheath insertion not delayed.

## 2023-09-21 NOTE — Clinical Note
Family updated. fall in bathroom, unwitnessed, on asa, probable left hip dislocation. hx of left hip repair in november. trauma alert initiated.

## 2023-09-21 NOTE — PROGRESS NOTES
[facility] Medicine Services  INPATIENT PROGRESS NOTE    Length of Stay: 0  Date of Admission: 9/21/2023  Primary Care Physician: Maisha Toure MD    Subjective   (S) Admitted for symptomatic bradycardia in the setting of previous a fib with ablation; a transvenous pacemaker was placed by cardiologist  Today he has been complaining of nausea, and later on vomiting    Review of Systems   All other systems reviewed and are negative.     All pertinent negatives and positives are as above. All other systems have been reviewed and are negative unless otherwise stated.     Prior to Admission medications    Medication Sig Start Date End Date Taking? Authorizing Provider   bumetanide (BUMEX) 2 MG tablet Take 1 tablet by mouth Daily. Pt takes 1-3 tablets daily 4/24/17  Yes Brittany Rios MD   FLUoxetine (PROzac) 10 MG capsule Take 1 capsule by mouth Daily.   Yes Brittany Rios MD   HumaLOG 100 UNIT/ML injection INJECT 150 UNITS DAILY VIA INSULIN PUMP (NEED TO BE SEEN) 5/14/21  Yes Casa Hall APRN   HYDROcodone-acetaminophen (NORCO)  MG per tablet Take 1 tablet by mouth Every 8 (Eight) Hours As Needed. 11/10/16  Yes Brittany Rios MD   hydroxychloroquine (PLAQUENIL) 200 MG tablet Take 1 tablet by mouth Daily.   Yes Brittany Rios MD   losartan (COZAAR) 25 MG tablet Take 4 tablets by mouth Daily. 3/6/18  Yes Brittany Rios MD   metoprolol tartrate (LOPRESSOR) 25 MG tablet Take 1 tablet by mouth Daily. 9/16/17  Yes Brittany Rios MD   potassium chloride (K-DUR) 10 MEQ CR tablet Take 2 tablets by mouth Daily. 12/28/16  Yes Brittany Rios MD   predniSONE (DELTASONE) 10 MG tablet Take 0.5 tablets by mouth Daily. 3/15/17  Yes Brittany Rios MD   rOPINIRole (REQUIP) 1 MG tablet Take 1 tablet by mouth Every Night. Take 1 hour before bedtime.   Yes Brittany Rios MD   tamsulosin (FLOMAX) 0.4 MG capsule  24 hr capsule Take 2 capsules by mouth Every Night.   Yes Brittany Rios MD   vitamin D (ERGOCALCIFEROL) 1.25 MG (28749 UT) capsule capsule Take 1 capsule by mouth Every 7 (Seven) Days. 9/9/20  Yes Casa Hall APRN   XARELTO 15 MG tablet Take 1 tablet by mouth Daily. 5/17/17  Yes Brittany Rios MD   ZETIA 10 MG tablet Take 1 tablet by mouth Daily. 12/28/16  Yes Brittany Rios MD   zolpidem (AMBIEN) 10 MG tablet Take 1 tablet by mouth At Night As Needed. 12/20/16  Yes Brittany Rios MD   amiodarone (PACERONE) 200 MG tablet Take 1 tablet by mouth 2 (Two) Times a Day. 5/17/17   Brittany Rios MD   cetirizine (ZyrTEC) 10 MG tablet Take 1 tablet by mouth Daily.    Brittany Rios MD   clindamycin (CLEOCIN) 300 MG capsule Take 1 capsule by mouth 2 (Two) Times a Day.  Patient not taking: Reported on 9/7/2023 6/2/17   Brittany Rios MD   dapagliflozin Propanediol 10 MG tablet Take  by mouth.    Brittany Rios MD   Glucagon (Baqsimi One Pack) 3 MG/DOSE powder 1 each into the nostril(s) as directed by provider As Needed (Hypoglycemia). Apply intranasal if hypoglycemia 9/7/23   Casa Hall APRN   glucose blood (Accu-Chek Guide) test strip Test 6 times daily. DX CODE: E11.65 6/7/23   Casa Hall APRN   glucose monitor monitoring kit INES CONTOUR NEXT METER.  USE TO TEST 4 TIMES A DAY.  DX-E11.8  Patient not taking: Reported on 9/7/2023 7/24/19   Casa Hall APRN   Lancets misc Test 4 times daily , E11.649 5/6/22   Casa Hall APRN   Multiple Vitamins-Minerals (DAILY MULTIVITAMIN PO) Take 1 capsule by mouth daily.    Brittany Rios MD   nystatin (MYCOSTATIN) 010152 UNIT/GM powder Apply  topically to the appropriate area as directed 4 (Four) Times a Day.    Brittany Rios MD   Omega-3 Fatty Acids (FISH OIL) 1000 MG capsule capsule Take 1 capsule by mouth 2 (Two) Times a Day.    Provider, MD Brittany    penicillin v potassium (VEETID) 500 MG tablet Take 1 tablet by mouth 4 (Four) Times a Day.    Provider, MD Brittany   sulfamethoxazole-trimethoprim (BACTRIM DS,SEPTRA DS) 800-160 MG per tablet Take 1 tablet by mouth 2 (Two) Times a Day.    Provider, MD Brittany       amLODIPine, 5 mg, Oral, Q24H  bumetanide, 2 mg, Oral, Daily  Canagliflozin, 300 mg, Oral, Daily  cholecalciferol, 1,000 Units, Oral, Daily  FLUoxetine, 10 mg, Oral, Daily  haloperidol lactate, 3 mg, Intramuscular, Once  hydroxychloroquine, 200 mg, Oral, Daily  Insulin Aspart, 0-14 Units, Subcutaneous, TID AC  losartan, 100 mg, Oral, Daily  predniSONE, 5 mg, Oral, Daily  rOPINIRole, 1 mg, Oral, Nightly  sodium chloride, 10 mL, Intravenous, Q12H  tamsulosin, 0.8 mg, Oral, Nightly      DOPamine, 2-20 mcg/kg/min, Last Rate: Stopped (09/21/23 0800)        Objective    Temp:  [97.8 °F (36.6 °C)] 97.8 °F (36.6 °C)  Heart Rate:  [26-70] 62  Resp:  [16-20] 16  BP: (118-204)/() 182/74    Physical Exam  Vitals reviewed.   HENT:      Head: Normocephalic.      Mouth/Throat:      Mouth: Mucous membranes are moist.   Eyes:      Extraocular Movements: Extraocular movements intact.   Cardiovascular:      Heart sounds: Normal heart sounds.      Comments: Ventricular pacing   Pulmonary:      Breath sounds: Normal breath sounds.   Abdominal:      General: There is distension.      Comments: Decrease bowel sounds   Musculoskeletal:         General: Normal range of motion.      Cervical back: Normal range of motion and neck supple.   Skin:     General: Skin is warm.   Neurological:      General: No focal deficit present.      Mental Status: He is alert and oriented to person, place, and time.   Psychiatric:      Comments: Anxious        Results Review:  I have reviewed the labs, radiology results, and diagnostic studies.    Laboratory Data:   Results from last 7 days   Lab Units 09/21/23  0325   SODIUM mmol/L 141   POTASSIUM mmol/L 4.3   CHLORIDE mmol/L 105    CO2 mmol/L 27.0   BUN mg/dL 34*   CREATININE mg/dL 1.92*   GLUCOSE mg/dL 94   CALCIUM mg/dL 8.8   BILIRUBIN mg/dL 0.2   ALK PHOS U/L 50   ALT (SGPT) U/L 12   AST (SGOT) U/L 15   ANION GAP mmol/L 9.0     Estimated Creatinine Clearance: 45.6 mL/min (A) (by C-G formula based on SCr of 1.92 mg/dL (H)).  Results from last 7 days   Lab Units 09/21/23  0325   MAGNESIUM mg/dL 2.1         Results from last 7 days   Lab Units 09/21/23  0325   WBC 10*3/mm3 8.42   HEMOGLOBIN g/dL 11.1*   HEMATOCRIT % 34.6*   PLATELETS 10*3/mm3 124*           Culture Data:   No results found for: BLOODCX  No results found for: URINECX  No results found for: RESPCX  No results found for: WOUNDCX  No results found for: STOOLCX  No components found for: BODYFLD    Radiology Data:   Imaging Results (Last 24 Hours)       Procedure Component Value Units Date/Time    XR Abdomen KUB [[id number]] Collected: 09/21/23 1310     Updated: 09/21/23 1314    Narrative:      XR ABDOMEN KUB    HISTORY: vomiting    COMPARISON:    FINDINGS:    Distended stomach.  There is air seen throughout large and small bowel loops as  well..    No definite findings of free intraperitoneal air.    The osseous structures demonstrate no acute abnormality.    Calcifications in the left hemipelvis.  I cannot clear the distal left ureter.    Stool burden: moderate      Impression:      1. Distended stomach.  There is air seen throughout large and small bowel loops.  Partial gastric outlet obstruction or gastroparesis not excluded.  2.  Several calcification seen in the lower left pelvis.  These may be  phleboliths.  I cannot clear the distal left ureter.          XR Chest 1 View [[id number]] Collected: 09/21/23 1230     Updated: 09/21/23 1235    Narrative:      Single frontal view of chest, compared to the study from 9/21/2023, shows  partial retraction of the right subclavian catheter with its tip now projecting  into the central left brachiocephalic vein across the midline.    A  second catheter introduced through the right IJ approach appears unchanged.    Other findings are also unchanged.    XR Chest AP [[id number]] Collected: 09/21/23 0758     Updated: 09/21/23 0820    Narrative:      INDICATION:  TVP placement.    COMPARISON:  9/21/2023, 0319 hours    FINDINGS:  Small bore catheter is seen over the right upper chest with its tip projected  over the anticipated location of the proximal SVC.  This apparently represents a  PICC line.  Clinical correlation needed.    There is a TVP entering from the right jugular.  Tip is within the anticipated  location in the right ventricle.  There is no pneumothorax.  Lungs remain clear.    XR Chest 1 View [[id number]] Collected: 09/21/23 0339     Updated: 09/21/23 0342    Narrative:      XR CHEST 1 VIEW    HISTORY: bradycardia    COMPARISON: None.    FINDINGS:  Frontal view of the chest was obtained.    The lungs are clear.There are low lung volumes.  The cardiac silhouette is  enlarged. There is no significant pleural effusion or pneumothorax.    The osseous structures and surrounding soft tissues demonstrate no acute  abnormality.    Upper abdomen is unremarkable.        Impression:      1. No acute cardiopulmonary disease.                I have reviewed the patient's current medications.     Assessment/Plan     Third degree heart block     S/p transvenous pacemaker     Appreciated cardiologist assistance    Ileus      Due to above     Discussed with Dr Dobbs and  he agreed with NGT     Hold CT abdomen for now and evaluate improvement with NGT in place     Coffee ground emesis     Discussed with Dr Healy to evaluate patient     Protonix drip ordered     Altered mental status     Likely due to above     Gave 2 IM doses of haldol and improved     Chronic medical problems     Essential hypertension     CKD stage III: it looks at his baseline      Medical Decision Making  Number and Complexity of problems: two major complexes  Differential Diagnosis:  SBO    Conditions and Status:        Condition is improving.     [facility] Data  External documents reviewed: previous medical records  My EKG interpretation: third degree block   My plain film interpretation: n/a  Tests considered but not ordered: none     Discussed with: RN and patient's grandson      Treatment Plan  See above    Care Planning  Shared decision making: his daughter   Code status and discussions: full code    Disposition  Social Determinants of Health that impact treatment or disposition: none  I expect the patient to be discharged: in progress      32 minutes of critical care provided. This time does not include other billable procedures. Time does include preparation of documents, medical consultations, review of old records, and direct bedside care updating family members; reviewing abdominal and chest x ray; discussing with family members the results of this test. Discussed with specialist Dr Healy for coffee ground emesis and addressing it with meds  I confirmed that the patient's Advance Care Plan is present, code status is documented, or surrogate decision maker is listed in the patient's medical record.       I confirmed that the patient's Advance Care Plan is present, code status is documented, or surrogate decision maker is listed in the patient's medical record.     Feliberto Corbett MD

## 2023-09-22 ENCOUNTER — APPOINTMENT (OUTPATIENT)
Dept: GENERAL RADIOLOGY | Facility: HOSPITAL | Age: 73
DRG: 243 | End: 2023-09-22
Payer: MEDICARE

## 2023-09-22 ENCOUNTER — ANESTHESIA EVENT (OUTPATIENT)
Dept: GASTROENTEROLOGY | Facility: HOSPITAL | Age: 73
DRG: 243 | End: 2023-09-22
Payer: MEDICARE

## 2023-09-22 ENCOUNTER — ANESTHESIA (OUTPATIENT)
Dept: CARDIOLOGY | Facility: HOSPITAL | Age: 73
DRG: 243 | End: 2023-09-22
Payer: MEDICARE

## 2023-09-22 ENCOUNTER — ANESTHESIA EVENT (OUTPATIENT)
Dept: CARDIOLOGY | Facility: HOSPITAL | Age: 73
DRG: 243 | End: 2023-09-22
Payer: MEDICARE

## 2023-09-22 ENCOUNTER — ANESTHESIA (OUTPATIENT)
Dept: GASTROENTEROLOGY | Facility: HOSPITAL | Age: 73
DRG: 243 | End: 2023-09-22
Payer: MEDICARE

## 2023-09-22 PROBLEM — K92.0 COFFEE GROUND EMESIS: Status: ACTIVE | Noted: 2023-09-21

## 2023-09-22 LAB
GLUCOSE BLDC GLUCOMTR-MCNC: 87 MG/DL (ref 70–130)
GLUCOSE BLDC GLUCOMTR-MCNC: 92 MG/DL (ref 70–130)
GLUCOSE BLDC GLUCOMTR-MCNC: 98 MG/DL (ref 70–130)

## 2023-09-22 PROCEDURE — 25010000002 CEFAZOLIN PER 500 MG: Performed by: INTERNAL MEDICINE

## 2023-09-22 PROCEDURE — C1898 LEAD, PMKR, OTHER THAN TRANS: HCPCS | Performed by: INTERNAL MEDICINE

## 2023-09-22 PROCEDURE — 33208 INSRT HEART PM ATRIAL & VENT: CPT | Performed by: INTERNAL MEDICINE

## 2023-09-22 PROCEDURE — 74018 RADEX ABDOMEN 1 VIEW: CPT

## 2023-09-22 PROCEDURE — 94761 N-INVAS EAR/PLS OXIMETRY MLT: CPT

## 2023-09-22 PROCEDURE — C1785 PMKR, DUAL, RATE-RESP: HCPCS | Performed by: INTERNAL MEDICINE

## 2023-09-22 PROCEDURE — 25010000002 HEPARIN (PORCINE) PER 1000 UNITS: Performed by: INTERNAL MEDICINE

## 2023-09-22 PROCEDURE — 94799 UNLISTED PULMONARY SVC/PX: CPT

## 2023-09-22 PROCEDURE — 25510000001 IOPAMIDOL PER 1 ML: Performed by: INTERNAL MEDICINE

## 2023-09-22 PROCEDURE — 99222 1ST HOSP IP/OBS MODERATE 55: CPT | Performed by: INTERNAL MEDICINE

## 2023-09-22 PROCEDURE — C1894 INTRO/SHEATH, NON-LASER: HCPCS | Performed by: INTERNAL MEDICINE

## 2023-09-22 PROCEDURE — 0DB98ZX EXCISION OF DUODENUM, VIA NATURAL OR ARTIFICIAL OPENING ENDOSCOPIC, DIAGNOSTIC: ICD-10-PCS | Performed by: INTERNAL MEDICINE

## 2023-09-22 PROCEDURE — 0DB78ZX EXCISION OF STOMACH, PYLORUS, VIA NATURAL OR ARTIFICIAL OPENING ENDOSCOPIC, DIAGNOSTIC: ICD-10-PCS | Performed by: INTERNAL MEDICINE

## 2023-09-22 PROCEDURE — 25010000002 PROPOFOL 10 MG/ML EMULSION: Performed by: NURSE ANESTHETIST, CERTIFIED REGISTERED

## 2023-09-22 PROCEDURE — 88312 SPECIAL STAINS GROUP 1: CPT

## 2023-09-22 PROCEDURE — 25010000002 MORPHINE PER 10 MG: Performed by: INTERNAL MEDICINE

## 2023-09-22 PROCEDURE — 82948 REAGENT STRIP/BLOOD GLUCOSE: CPT

## 2023-09-22 PROCEDURE — 02HK3JZ INSERTION OF PACEMAKER LEAD INTO RIGHT VENTRICLE, PERCUTANEOUS APPROACH: ICD-10-PCS | Performed by: INTERNAL MEDICINE

## 2023-09-22 PROCEDURE — 02PA3MZ REMOVAL OF CARDIAC LEAD FROM HEART, PERCUTANEOUS APPROACH: ICD-10-PCS | Performed by: INTERNAL MEDICINE

## 2023-09-22 PROCEDURE — 0JH606Z INSERTION OF PACEMAKER, DUAL CHAMBER INTO CHEST SUBCUTANEOUS TISSUE AND FASCIA, OPEN APPROACH: ICD-10-PCS | Performed by: INTERNAL MEDICINE

## 2023-09-22 PROCEDURE — 02H63JZ INSERTION OF PACEMAKER LEAD INTO RIGHT ATRIUM, PERCUTANEOUS APPROACH: ICD-10-PCS | Performed by: INTERNAL MEDICINE

## 2023-09-22 PROCEDURE — 25010000002 GENTAMICIN PER 80 MG

## 2023-09-22 PROCEDURE — 94664 DEMO&/EVAL PT USE INHALER: CPT

## 2023-09-22 PROCEDURE — 43239 EGD BIOPSY SINGLE/MULTIPLE: CPT | Performed by: INTERNAL MEDICINE

## 2023-09-22 PROCEDURE — 25010000002 PROPOFOL 10 MG/ML EMULSION

## 2023-09-22 PROCEDURE — 71045 X-RAY EXAM CHEST 1 VIEW: CPT

## 2023-09-22 PROCEDURE — 25010000002 MIDAZOLAM PER 1 MG

## 2023-09-22 PROCEDURE — 94640 AIRWAY INHALATION TREATMENT: CPT

## 2023-09-22 PROCEDURE — 25010000002 FENTANYL CITRATE (PF) 100 MCG/2ML SOLUTION

## 2023-09-22 PROCEDURE — 88305 TISSUE EXAM BY PATHOLOGIST: CPT

## 2023-09-22 PROCEDURE — 0DB58ZX EXCISION OF ESOPHAGUS, VIA NATURAL OR ARTIFICIAL OPENING ENDOSCOPIC, DIAGNOSTIC: ICD-10-PCS | Performed by: INTERNAL MEDICINE

## 2023-09-22 DEVICE — LD PM TENDRIL STS 6F52CM 2088TC52: Type: IMPLANTABLE DEVICE | Status: FUNCTIONAL

## 2023-09-22 DEVICE — GEN PM ASSURITY MRI DR RF PM2272: Type: IMPLANTABLE DEVICE | Status: FUNCTIONAL

## 2023-09-22 DEVICE — LD PM TENDRIL STS 6F46CM 2088TC46: Type: IMPLANTABLE DEVICE | Status: FUNCTIONAL

## 2023-09-22 RX ORDER — LIDOCAINE HYDROCHLORIDE 20 MG/ML
INJECTION, SOLUTION INFILTRATION; PERINEURAL
Status: DISCONTINUED | OUTPATIENT
Start: 2023-09-22 | End: 2023-09-22 | Stop reason: HOSPADM

## 2023-09-22 RX ORDER — ONDANSETRON 2 MG/ML
4 INJECTION INTRAMUSCULAR; INTRAVENOUS ONCE AS NEEDED
Status: DISCONTINUED | OUTPATIENT
Start: 2023-09-22 | End: 2023-09-22 | Stop reason: SDUPTHER

## 2023-09-22 RX ORDER — SODIUM CHLORIDE 9 MG/ML
INJECTION, SOLUTION INTRAVENOUS CONTINUOUS PRN
Status: DISCONTINUED | OUTPATIENT
Start: 2023-09-22 | End: 2023-09-22 | Stop reason: SURG

## 2023-09-22 RX ORDER — GENTAMICIN SULFATE 40 MG/ML
INJECTION, SOLUTION INTRAMUSCULAR; INTRAVENOUS AS NEEDED
Status: DISCONTINUED | OUTPATIENT
Start: 2023-09-22 | End: 2023-09-22 | Stop reason: SURG

## 2023-09-22 RX ORDER — IPRATROPIUM BROMIDE AND ALBUTEROL SULFATE 2.5; .5 MG/3ML; MG/3ML
3 SOLUTION RESPIRATORY (INHALATION) ONCE
Status: COMPLETED | OUTPATIENT
Start: 2023-09-22 | End: 2023-09-22

## 2023-09-22 RX ORDER — PROPOFOL 10 MG/ML
VIAL (ML) INTRAVENOUS AS NEEDED
Status: DISCONTINUED | OUTPATIENT
Start: 2023-09-22 | End: 2023-09-22 | Stop reason: SURG

## 2023-09-22 RX ORDER — NITROGLYCERIN 0.4 MG/1
0.4 TABLET SUBLINGUAL
Status: DISCONTINUED | OUTPATIENT
Start: 2023-09-22 | End: 2023-09-25 | Stop reason: HOSPADM

## 2023-09-22 RX ORDER — SODIUM CHLORIDE 0.9 % (FLUSH) 0.9 %
10 SYRINGE (ML) INJECTION EVERY 12 HOURS SCHEDULED
Status: DISCONTINUED | OUTPATIENT
Start: 2023-09-22 | End: 2023-09-25 | Stop reason: HOSPADM

## 2023-09-22 RX ORDER — SODIUM CHLORIDE 0.9 % (FLUSH) 0.9 %
10 SYRINGE (ML) INJECTION AS NEEDED
Status: DISCONTINUED | OUTPATIENT
Start: 2023-09-22 | End: 2023-09-25 | Stop reason: HOSPADM

## 2023-09-22 RX ORDER — DEXTROSE AND SODIUM CHLORIDE 5; .45 G/100ML; G/100ML
30 INJECTION, SOLUTION INTRAVENOUS CONTINUOUS PRN
Status: DISCONTINUED | OUTPATIENT
Start: 2023-09-22 | End: 2023-09-25 | Stop reason: HOSPADM

## 2023-09-22 RX ORDER — ONDANSETRON 2 MG/ML
4 INJECTION INTRAMUSCULAR; INTRAVENOUS EVERY 6 HOURS PRN
Status: DISCONTINUED | OUTPATIENT
Start: 2023-09-22 | End: 2023-09-25 | Stop reason: HOSPADM

## 2023-09-22 RX ORDER — SODIUM CHLORIDE 9 MG/ML
40 INJECTION, SOLUTION INTRAVENOUS AS NEEDED
Status: DISCONTINUED | OUTPATIENT
Start: 2023-09-22 | End: 2023-09-25 | Stop reason: HOSPADM

## 2023-09-22 RX ORDER — MIDAZOLAM HYDROCHLORIDE 1 MG/ML
INJECTION INTRAMUSCULAR; INTRAVENOUS AS NEEDED
Status: DISCONTINUED | OUTPATIENT
Start: 2023-09-22 | End: 2023-09-22 | Stop reason: SURG

## 2023-09-22 RX ORDER — FENTANYL CITRATE 50 UG/ML
INJECTION, SOLUTION INTRAMUSCULAR; INTRAVENOUS AS NEEDED
Status: DISCONTINUED | OUTPATIENT
Start: 2023-09-22 | End: 2023-09-22 | Stop reason: SURG

## 2023-09-22 RX ADMIN — SODIUM CHLORIDE 8 MG/HR: 900 INJECTION INTRAVENOUS at 23:44

## 2023-09-22 RX ADMIN — PROPOFOL 20 MG: 10 INJECTION, EMULSION INTRAVENOUS at 15:04

## 2023-09-22 RX ADMIN — MORPHINE SULFATE 2 MG: 2 INJECTION, SOLUTION INTRAMUSCULAR; INTRAVENOUS at 23:44

## 2023-09-22 RX ADMIN — SODIUM CHLORIDE: 9 INJECTION, SOLUTION INTRAVENOUS at 13:45

## 2023-09-22 RX ADMIN — TAMSULOSIN HYDROCHLORIDE 0.8 MG: 0.4 CAPSULE ORAL at 20:40

## 2023-09-22 RX ADMIN — MORPHINE SULFATE 2 MG: 2 INJECTION, SOLUTION INTRAMUSCULAR; INTRAVENOUS at 18:05

## 2023-09-22 RX ADMIN — FENTANYL CITRATE 25 MCG: 50 INJECTION, SOLUTION INTRAMUSCULAR; INTRAVENOUS at 14:36

## 2023-09-22 RX ADMIN — GENTAMICIN SULFATE 80 MG: 40 INJECTION, SOLUTION INTRAMUSCULAR; INTRAVENOUS at 14:34

## 2023-09-22 RX ADMIN — MORPHINE SULFATE 2 MG: 2 INJECTION, SOLUTION INTRAMUSCULAR; INTRAVENOUS at 04:04

## 2023-09-22 RX ADMIN — PROPOFOL 70 MG: 10 INJECTION, EMULSION INTRAVENOUS at 16:57

## 2023-09-22 RX ADMIN — IPRATROPIUM BROMIDE AND ALBUTEROL SULFATE 3 ML: .5; 3 SOLUTION RESPIRATORY (INHALATION) at 13:28

## 2023-09-22 RX ADMIN — SODIUM CHLORIDE 8 MG/HR: 900 INJECTION INTRAVENOUS at 03:55

## 2023-09-22 RX ADMIN — MIDAZOLAM HYDROCHLORIDE 1 MG: 1 INJECTION, SOLUTION INTRAMUSCULAR; INTRAVENOUS at 14:21

## 2023-09-22 RX ADMIN — FENTANYL CITRATE 25 MCG: 50 INJECTION, SOLUTION INTRAMUSCULAR; INTRAVENOUS at 14:46

## 2023-09-22 RX ADMIN — Medication 2000 MG: at 14:21

## 2023-09-22 RX ADMIN — PROPOFOL 30 MG: 10 INJECTION, EMULSION INTRAVENOUS at 17:01

## 2023-09-22 RX ADMIN — ROPINIROLE 1 MG: 1 TABLET, FILM COATED ORAL at 20:40

## 2023-09-22 RX ADMIN — MORPHINE SULFATE 2 MG: 2 INJECTION, SOLUTION INTRAMUSCULAR; INTRAVENOUS at 09:10

## 2023-09-22 RX ADMIN — Medication 10 ML: at 09:53

## 2023-09-22 RX ADMIN — FENTANYL CITRATE 50 MCG: 50 INJECTION, SOLUTION INTRAMUSCULAR; INTRAVENOUS at 14:30

## 2023-09-22 NOTE — CONSULTS
SUBJECTIVE:   9/22/2023    Name: Jovan Hardwick  DOD: 1950    REASON FOR CONSULT: Coffee-ground emesis    Chief Complaint:     Chief Complaint   Patient presents with    Slow Heart Rate    Dizziness       Subjective     Patient is 72 y.o. male with past medical history of diabetic neuropathy, hypertension, hypercholesterolemia, lupus erythematosus, tobacco usage, diabetes mellitus, coronary artery disease, atrial fibrillation status post ablation presents with fatigue, lightheadedness and dizziness.  He was noted to have bradycardia and had temporary pacemaker placement.  Patient subsequently had epigastric pain associated with nausea and vomited coffee-ground emesis.  NG tube placement revealed 600 cc of coffee-ground fluid hemoglobin is 11.9.  He denied melena or rectal bleeding.  Also denied NSAID usage.  Has pacemaker placement scheduled today.  His last colonoscopy by Dr. Elizabeth was 4 to 5 years ago.   ROS/HISTORY/ CURRENT MEDICATIONS/OBJECTIVE/VS/PE:   Review of Systems:   Review of Systems   Constitutional:  Negative for chills, fatigue, fever and unexpected weight change.   HENT:  Negative for congestion, ear discharge, hearing loss, nosebleeds and sore throat.    Eyes:  Negative for pain, discharge and redness.   Respiratory:  Negative for cough, chest tightness, shortness of breath and wheezing.    Cardiovascular:  Negative for chest pain and palpitations.   Gastrointestinal:  Positive for nausea and vomiting. Negative for abdominal distention, abdominal pain, blood in stool, constipation and diarrhea.   Endocrine: Negative for cold intolerance, polydipsia, polyphagia and polyuria.   Genitourinary:  Negative for dysuria, flank pain, frequency, hematuria and urgency.   Musculoskeletal:  Negative for arthralgias, back pain, joint swelling and myalgias.   Skin:  Negative for color change, pallor and rash.   Neurological:  Positive for dizziness. Negative for tremors, seizures, syncope, weakness and  headaches.   Hematological:  Negative for adenopathy. Does not bruise/bleed easily.   Psychiatric/Behavioral:  Negative for behavioral problems, confusion, dysphoric mood, hallucinations and suicidal ideas. The patient is not nervous/anxious.      History:     Past Medical History:   Diagnosis Date    Diabetic neuropathy     Elevated blood pressure     Hypercholesterolemia     Lupus erythematosus     Tobacco user     Type 2 diabetes mellitus with hyperglycemia     Type 2 diabetes mellitus, uncontrolled      Past Surgical History:   Procedure Laterality Date    CARDIAC CATHETERIZATION  03/26/2014    No epicardial coronary artery disease. Normal LV systolic function. EF 55%. No wall motion abnormalities. Systemic hypertension.     Family History   Problem Relation Age of Onset    Diabetes Other      Social History     Tobacco Use    Smoking status: Every Day     Packs/day: 0.50     Years: 15.00     Pack years: 7.50     Types: Cigarettes    Smokeless tobacco: Never    Tobacco comments:     encouraged smoking cessation    Vaping Use    Vaping Use: Never used   Substance Use Topics    Alcohol use: No    Drug use: Never     Medications Prior to Admission   Medication Sig Dispense Refill Last Dose    bumetanide (BUMEX) 2 MG tablet Take 1 tablet by mouth Daily. Pt takes 1-3 tablets daily   Patient Taking Differently    FLUoxetine (PROzac) 10 MG capsule Take 1 capsule by mouth Daily.       HumaLOG 100 UNIT/ML injection INJECT 150 UNITS DAILY VIA INSULIN PUMP (NEED TO BE SEEN) 150 mL 2     HYDROcodone-acetaminophen (NORCO)  MG per tablet Take 1 tablet by mouth Every 8 (Eight) Hours As Needed.       hydroxychloroquine (PLAQUENIL) 200 MG tablet Take 1 tablet by mouth Daily.       losartan (COZAAR) 25 MG tablet Take 4 tablets by mouth Daily.  3 Patient Taking Differently    metoprolol tartrate (LOPRESSOR) 25 MG tablet Take 1 tablet by mouth Daily.       potassium chloride (K-DUR) 10 MEQ CR tablet Take 2 tablets by mouth  Daily.   Patient Taking Differently    predniSONE (DELTASONE) 10 MG tablet Take 0.5 tablets by mouth Daily.   Patient Taking Differently    rOPINIRole (REQUIP) 1 MG tablet Take 1 tablet by mouth Every Night. Take 1 hour before bedtime.       tamsulosin (FLOMAX) 0.4 MG capsule 24 hr capsule Take 2 capsules by mouth Every Night.       vitamin D (ERGOCALCIFEROL) 1.25 MG (95954 UT) capsule capsule Take 1 capsule by mouth Every 7 (Seven) Days. 4 capsule 5     XARELTO 15 MG tablet Take 1 tablet by mouth Daily.   9/20/2023    ZETIA 10 MG tablet Take 1 tablet by mouth Daily.       zolpidem (AMBIEN) 10 MG tablet Take 1 tablet by mouth At Night As Needed.       amiodarone (PACERONE) 200 MG tablet Take 1 tablet by mouth 2 (Two) Times a Day.       cetirizine (ZyrTEC) 10 MG tablet Take 1 tablet by mouth Daily.       clindamycin (CLEOCIN) 300 MG capsule Take 1 capsule by mouth 2 (Two) Times a Day. (Patient not taking: Reported on 9/7/2023)       dapagliflozin Propanediol 10 MG tablet Take  by mouth.       Glucagon (Baqsimi One Pack) 3 MG/DOSE powder 1 each into the nostril(s) as directed by provider As Needed (Hypoglycemia). Apply intranasal if hypoglycemia 2 each 11     glucose blood (Accu-Chek Guide) test strip Test 6 times daily. DX CODE: E11.65 540 each 3     glucose monitor monitoring kit INES CONTOUR NEXT METER.  USE TO TEST 4 TIMES A DAY.  DX-E11.8 (Patient not taking: Reported on 9/7/2023) 1 each 11     Lancets misc Test 4 times daily , E11.649 360 each 3     Multiple Vitamins-Minerals (DAILY MULTIVITAMIN PO) Take 1 capsule by mouth daily.       nystatin (MYCOSTATIN) 190992 UNIT/GM powder Apply  topically to the appropriate area as directed 4 (Four) Times a Day.       Omega-3 Fatty Acids (FISH OIL) 1000 MG capsule capsule Take 1 capsule by mouth 2 (Two) Times a Day.       penicillin v potassium (VEETID) 500 MG tablet Take 1 tablet by mouth 4 (Four) Times a Day.       sulfamethoxazole-trimethoprim (BACTRIM DS,SEPTRA DS)  800-160 MG per tablet Take 1 tablet by mouth 2 (Two) Times a Day.        Allergies:  Patient has no known allergies.    I have reviewed the patient's medical history, surgical history and family history in the available medical record system.     Current Medications:     Current Facility-Administered Medications   Medication Dose Route Frequency Provider Last Rate Last Admin    acetaminophen (TYLENOL) tablet 650 mg  650 mg Oral Q4H PRN Jacinto Vargas MD        Or    acetaminophen (TYLENOL) 160 MG/5ML oral solution 650 mg  650 mg Oral Q4H PRN Jacinto Vargas MD        Or    acetaminophen (TYLENOL) suppository 650 mg  650 mg Rectal Q4H PRN Jacinto Vargas MD        amLODIPine (NORVASC) tablet 5 mg  5 mg Oral Q24H Jacinto Vargas MD   5 mg at 09/21/23 1528    bumetanide (BUMEX) tablet 2 mg  2 mg Oral Daily Jacinto Vargas MD   2 mg at 09/21/23 1528    Canagliflozin (INVOKANA) 300 MG tablet tablet 300 mg  300 mg Oral Daily Jacinto Vargas MD        ceFAZolin in 0.9% normal saline (ANCEF) IVPB solution 2,000 mg  2,000 mg Intravenous Once Elba Brown MD        cholecalciferol (VITAMIN D3) tablet 1,000 Units  1,000 Units Oral Daily Jacinto Vargas MD   1,000 Units at 09/21/23 1529    dextrose (D50W) (25 g/50 mL) IV injection 25 g  25 g Intravenous Q15 Min PRN Jacinto Vargas MD        dextrose (GLUTOSE) oral gel 15 g  15 g Oral Q15 Min PRN Jacinto Vargas MD        DOPamine 400 mg in 250 mL D5W infusion  2-20 mcg/kg/min Intravenous Titrated Jatinder Mueller DO   Stopped at 09/21/23 0800    FLUoxetine (PROzac) capsule 10 mg  10 mg Oral Daily Jacinto Vargas MD   10 mg at 09/21/23 0850    glucagon HCl (Diagnostic) injection 1 mg  1 mg Intramuscular Q15 Min PRN Jacinto Vargas MD        haloperidol lactate (HALDOL) injection 3 mg  3 mg Intramuscular Once Feliberto Corbett MD        hydrALAZINE (APRESOLINE) injection 20 mg  20 mg  Intravenous Q6H PRN Jacinto Vargas MD        HYDROcodone-acetaminophen (NORCO) 5-325 MG per tablet 1 tablet  1 tablet Oral Q6H PRN Feliberto Corbett MD        hydroxychloroquine (PLAQUENIL) tablet 200 mg  200 mg Oral Daily Jacinto Vargas MD   200 mg at 09/21/23 1529    Insulin Aspart (novoLOG) injection 0-14 Units  0-14 Units Subcutaneous TID AC Jacinto Vargas MD        losartan (COZAAR) tablet 100 mg  100 mg Oral Daily Jacinto Vargas MD   100 mg at 09/21/23 1530    melatonin tablet 5 mg  5 mg Oral Nightly PRN Jacinto Vargas MD        morphine injection 2 mg  2 mg Intravenous Q4H PRN Feliberto Corbett MD   2 mg at 09/22/23 0910    ondansetron (ZOFRAN) tablet 4 mg  4 mg Oral Q6H PRN Jacinto Vargas MD        Or    ondansetron (ZOFRAN) injection 4 mg  4 mg Intravenous Q6H PRN Jacinto Vargas MD   4 mg at 09/21/23 1140    pantoprazole (PROTONIX) 40 mg in 100mL NS IVPB  8 mg/hr Intravenous Continuous Feliberto Corbett MD 20 mL/hr at 09/22/23 0355 8 mg/hr at 09/22/23 0355    predniSONE (DELTASONE) tablet 5 mg  5 mg Oral Daily Jacinto Vargas MD   5 mg at 09/21/23 1530    rOPINIRole (REQUIP) tablet 1 mg  1 mg Oral Nightly Jacinto Vargas MD        [MAR Hold] sodium chloride 0.9 % flush 10 mL  10 mL Intravenous PRN Jatinder Mueller DO        sodium chloride 0.9 % flush 10 mL  10 mL Intravenous Q12H Jacinto Vargas MD   10 mL at 09/22/23 0953    sodium chloride 0.9 % flush 10 mL  10 mL Intravenous PRN Jacinto Vargas MD        sodium chloride 0.9 % flush 10 mL  10 mL Intravenous PRN Elba Brown MD        sodium chloride 0.9 % flush 3 mL  3 mL Intravenous Q12H Elba Brown MD        sodium chloride 0.9 % infusion 40 mL  40 mL Intravenous PRN Jacinto Vargas MD        sodium chloride 0.9 % infusion 40 mL  40 mL Intravenous PRN Elba Brown MD        tamsulosin (FLOMAX) 24 hr capsule 0.8 mg  0.8 mg Oral Nightly  Jacinto Vargas MD           Objective     Physical Exam:   Temp:  [98 °F (36.7 °C)-100 °F (37.8 °C)] 98 °F (36.7 °C)  Heart Rate:  [61-62] 61  Resp:  [20-24] 24  BP: (133-195)/(60-93) 165/74    Physical Exam:  General Appearance:    Alert, cooperative, in no acute distress   Head:    Normocephalic, without obvious abnormality, atraumatic   Eyes:            Lids and lashes normal, conjunctivae and sclerae normal, no   icterus, no pallor, corneas clear, PERRLA   Ears:    Ears appear intact with no abnormalities noted   Throat:   No oral lesions, no thrush, oral mucosa moist   Neck:   No adenopathy, supple, trachea midline, no thyromegaly, no     carotid bruit, no JVD   Back:     No kyphosis present, no scoliosis present, no skin lesions,       erythema or scars, no tenderness to percussion or                   palpation,   range of motion normal   Lungs:     Clear to auscultation,respirations regular, even and                   unlabored    Heart:    Regular rhythm and normal rate, normal S1 and S2, no            murmur, no gallop, no rub, no click   Breast Exam:    Deferred   Abdomen:     Normal bowel sounds, no masses, no organomegaly, soft        nontender, nondistended, no guarding, no rebound                 tenderness   Genitalia:    Deferred   Extremities:   Moves all extremities well, no edema, no cyanosis, no              redness   Pulses:   Pulses palpable and equal bilaterally   Skin:   No bleeding, bruising or rash   Lymph nodes:   No palpable adenopathy   Neurologic:   Cranial nerves 2 - 12 grossly intact, sensation intact, DTR        present and equal bilaterally      Results Review:     Lab Results   Component Value Date    WBC 11.88 (H) 09/21/2023    WBC 8.42 09/21/2023    WBC 6.6 03/01/2023    HGB 11.9 (L) 09/21/2023    HGB 11.1 (L) 09/21/2023    HGB 11.1 (A) 03/01/2023    HCT 33.9 (L) 09/21/2023    HCT 34.6 (L) 09/21/2023    HCT 34.2 (A) 03/01/2023     (L) 09/21/2023     (L)  09/21/2023     (A) 03/01/2023     Results from last 7 days   Lab Units 09/21/23  0325   ALK PHOS U/L 50   ALT (SGPT) U/L 12   AST (SGOT) U/L 15     Results from last 7 days   Lab Units 09/21/23  0325   BILIRUBIN mg/dL 0.2   ALK PHOS U/L 50     No results found for: LIPASE  Lab Results   Component Value Date    INR 1.16 09/21/2023         Radiology Review:  Imaging Results (Last 72 Hours)       Procedure Component Value Units Date/Time    XR Abdomen KUB [[id number]] Resulted: 09/22/23 1209     Updated: 09/22/23 1229    XR Abdomen KUB [[id number]] Collected: 09/21/23 1310     Updated: 09/21/23 1314    Narrative:      XR ABDOMEN KUB    HISTORY: vomiting    COMPARISON:    FINDINGS:    Distended stomach.  There is air seen throughout large and small bowel loops as  well..    No definite findings of free intraperitoneal air.    The osseous structures demonstrate no acute abnormality.    Calcifications in the left hemipelvis.  I cannot clear the distal left ureter.    Stool burden: moderate      Impression:      1. Distended stomach.  There is air seen throughout large and small bowel loops.  Partial gastric outlet obstruction or gastroparesis not excluded.  2.  Several calcification seen in the lower left pelvis.  These may be  phleboliths.  I cannot clear the distal left ureter.          XR Chest 1 View [[id number]] Collected: 09/21/23 1230     Updated: 09/21/23 1235    Narrative:      Single frontal view of chest, compared to the study from 9/21/2023, shows  partial retraction of the right subclavian catheter with its tip now projecting  into the central left brachiocephalic vein across the midline.    A second catheter introduced through the right IJ approach appears unchanged.    Other findings are also unchanged.    XR Chest AP [[id number]] Collected: 09/21/23 0758     Updated: 09/21/23 0820    Narrative:      INDICATION:  TVP placement.    COMPARISON:  9/21/2023, 0319 hours    FINDINGS:  Small bore catheter is  seen over the right upper chest with its tip projected  over the anticipated location of the proximal SVC.  This apparently represents a  PICC line.  Clinical correlation needed.    There is a TVP entering from the right jugular.  Tip is within the anticipated  location in the right ventricle.  There is no pneumothorax.  Lungs remain clear.    XR Chest 1 View [[id number]] Collected: 09/21/23 0339     Updated: 09/21/23 0342    Narrative:      XR CHEST 1 VIEW    HISTORY: bradycardia    COMPARISON: None.    FINDINGS:  Frontal view of the chest was obtained.    The lungs are clear.There are low lung volumes.  The cardiac silhouette is  enlarged. There is no significant pleural effusion or pneumothorax.    The osseous structures and surrounding soft tissues demonstrate no acute  abnormality.    Upper abdomen is unremarkable.        Impression:      1. No acute cardiopulmonary disease.                I reviewed the patient's new clinical results.    I reviewed the patient's new imaging results and agree with the interpretation.     ASSESSMENT/PLAN:   ASSESSMENT: 1.  Nausea with vomiting, coffee-ground emesis and anemia rule out peptic ulcer disease, gastritis, Trinh-Horton tear and esophagitis.  2.  Bradycardia s/p temporary pacemaker placement, awaiting permanent pacemaker.  Currently on dopamine.  PLAN: 1.  Continue PPI, bowel rest, antiemetics and current supportive care  2.  EGD today for further evaluation  3.  Follow H&H closely and transfuse as needed  The risks, benefits, and alternatives of this procedure have been discussed with the patient or the responsible party. The patient understands and agrees to proceed.         Emery Healy MD  09/22/23  12:55 CDT

## 2023-09-22 NOTE — PROGRESS NOTES
[facility] Medicine Services  INPATIENT PROGRESS NOTE    Length of Stay: 1  Date of Admission: 9/21/2023  Primary Care Physician: Maisha Toure MD    Subjective   (S) Admitted for symptomatic bradycardia in the setting of previous a fib with ablation; a transvenous pacemaker was placed by cardiologist  Today he looks much better after 24 hours NGT; no vomiting    Review of Systems   All other systems reviewed and are negative.     All pertinent negatives and positives are as above. All other systems have been reviewed and are negative unless otherwise stated.     Prior to Admission medications    Medication Sig Start Date End Date Taking? Authorizing Provider   bumetanide (BUMEX) 2 MG tablet Take 1 tablet by mouth Daily. Pt takes 1-3 tablets daily 4/24/17  Yes Brittany Rios MD   FLUoxetine (PROzac) 10 MG capsule Take 1 capsule by mouth Daily.   Yes Brittany Rois MD   HumaLOG 100 UNIT/ML injection INJECT 150 UNITS DAILY VIA INSULIN PUMP (NEED TO BE SEEN) 5/14/21  Yes Casa Hall APRN   HYDROcodone-acetaminophen (NORCO)  MG per tablet Take 1 tablet by mouth Every 8 (Eight) Hours As Needed. 11/10/16  Yes Brittany Rios MD   hydroxychloroquine (PLAQUENIL) 200 MG tablet Take 1 tablet by mouth Daily.   Yes Brittany Rios MD   losartan (COZAAR) 25 MG tablet Take 4 tablets by mouth Daily. 3/6/18  Yes Brittany Rios MD   metoprolol tartrate (LOPRESSOR) 25 MG tablet Take 1 tablet by mouth Daily. 9/16/17  Yes Brittany Rios MD   potassium chloride (K-DUR) 10 MEQ CR tablet Take 2 tablets by mouth Daily. 12/28/16  Yes Brittany Rios MD   predniSONE (DELTASONE) 10 MG tablet Take 0.5 tablets by mouth Daily. 3/15/17  Yes Brittany Rios MD   rOPINIRole (REQUIP) 1 MG tablet Take 1 tablet by mouth Every Night. Take 1 hour before bedtime.   Yes Brittany Rios MD   tamsulosin (FLOMAX) 0.4 MG capsule 24 hr  capsule Take 2 capsules by mouth Every Night.   Yes Brittany Rios MD   vitamin D (ERGOCALCIFEROL) 1.25 MG (30419 UT) capsule capsule Take 1 capsule by mouth Every 7 (Seven) Days. 9/9/20  Yes Casa Hall APRN   XARELTO 15 MG tablet Take 1 tablet by mouth Daily. 5/17/17  Yes Brittany Rios MD   ZETIA 10 MG tablet Take 1 tablet by mouth Daily. 12/28/16  Yes Brittany Rios MD   zolpidem (AMBIEN) 10 MG tablet Take 1 tablet by mouth At Night As Needed. 12/20/16  Yes Brittany Rios MD   amiodarone (PACERONE) 200 MG tablet Take 1 tablet by mouth 2 (Two) Times a Day. 5/17/17   Brittany Rios MD   cetirizine (ZyrTEC) 10 MG tablet Take 1 tablet by mouth Daily.    Brittany Rios MD   clindamycin (CLEOCIN) 300 MG capsule Take 1 capsule by mouth 2 (Two) Times a Day.  Patient not taking: Reported on 9/7/2023 6/2/17   Brittany Rios MD   dapagliflozin Propanediol 10 MG tablet Take  by mouth.    Brittany Rios MD   Glucagon (Baqsimi One Pack) 3 MG/DOSE powder 1 each into the nostril(s) as directed by provider As Needed (Hypoglycemia). Apply intranasal if hypoglycemia 9/7/23   Casa Hall APRN   glucose blood (Accu-Chek Guide) test strip Test 6 times daily. DX CODE: E11.65 6/7/23   Casa Hall APRN   glucose monitor monitoring kit INES CONTOUR NEXT METER.  USE TO TEST 4 TIMES A DAY.  DX-E11.8  Patient not taking: Reported on 9/7/2023 7/24/19   Casa Hall APRN   Lancets misc Test 4 times daily , E11.649 5/6/22   Casa Hall APRN   Multiple Vitamins-Minerals (DAILY MULTIVITAMIN PO) Take 1 capsule by mouth daily.    Brittany Rios MD   nystatin (MYCOSTATIN) 164539 UNIT/GM powder Apply  topically to the appropriate area as directed 4 (Four) Times a Day.    Brittany Rios MD   Omega-3 Fatty Acids (FISH OIL) 1000 MG capsule capsule Take 1 capsule by mouth 2 (Two) Times a Day.    Provider, MD Brittany    penicillin v potassium (VEETID) 500 MG tablet Take 1 tablet by mouth 4 (Four) Times a Day.    ProviderBrittany MD   sulfamethoxazole-trimethoprim (BACTRIM DS,SEPTRA DS) 800-160 MG per tablet Take 1 tablet by mouth 2 (Two) Times a Day.    ProviderBrittany MD       amLODIPine, 5 mg, Oral, Q24H  bumetanide, 2 mg, Oral, Daily  Canagliflozin, 300 mg, Oral, Daily  ceFAZolin, 1,000 mg, Intravenous, Once  ceFAZolin, 2,000 mg, Intravenous, Once  cholecalciferol, 1,000 Units, Oral, Daily  FLUoxetine, 10 mg, Oral, Daily  gentamicin, 80 mg, Intravenous, Once  haloperidol lactate, 3 mg, Intramuscular, Once  hydroxychloroquine, 200 mg, Oral, Daily  Insulin Aspart, 0-14 Units, Subcutaneous, TID AC  losartan, 100 mg, Oral, Daily  predniSONE, 5 mg, Oral, Daily  rOPINIRole, 1 mg, Oral, Nightly  sodium chloride, 10 mL, Intravenous, Q12H  sodium chloride, 3 mL, Intravenous, Q12H  tamsulosin, 0.8 mg, Oral, Nightly      DOPamine, 2-20 mcg/kg/min, Last Rate: Stopped (09/21/23 0800)  pantoprazole, 8 mg/hr, Last Rate: 8 mg/hr (09/22/23 0355)        Objective    Temp:  [98 °F (36.7 °C)-100 °F (37.8 °C)] 98 °F (36.7 °C)  Heart Rate:  [61-62] 62  Resp:  [16-24] 16  BP: (133-195)/(60-93) 165/74    Physical Exam  Vitals reviewed.   Constitutional:       Comments: Ill appearing but much better   HENT:      Head: Normocephalic.      Mouth/Throat:      Mouth: Mucous membranes are moist.   Eyes:      Extraocular Movements: Extraocular movements intact.   Cardiovascular:      Heart sounds: Normal heart sounds.      Comments: Ventricular pacing   Pulmonary:      Breath sounds: Normal breath sounds.   Abdominal:      General: There is no distension.      Tenderness: There is no abdominal tenderness.      Comments: Decrease bowel sounds   Musculoskeletal:         General: Normal range of motion.      Cervical back: Normal range of motion and neck supple.   Skin:     General: Skin is warm.   Neurological:      General: No focal deficit  present.      Mental Status: He is alert and oriented to person, place, and time.   Psychiatric:      Comments: Anxious        Results Review:  I have reviewed the labs, radiology results, and diagnostic studies.    Laboratory Data:   Results from last 7 days   Lab Units 09/21/23  0643 09/21/23  0325   SODIUM mmol/L 139 141   POTASSIUM mmol/L 4.6 4.3   CHLORIDE mmol/L 102 105   CO2 mmol/L 24.0 27.0   BUN mg/dL 35* 34*   CREATININE mg/dL 2.02* 1.92*   GLUCOSE mg/dL 191* 94   CALCIUM mg/dL 9.4 8.8   BILIRUBIN mg/dL  --  0.2   ALK PHOS U/L  --  50   ALT (SGPT) U/L  --  12   AST (SGOT) U/L  --  15   ANION GAP mmol/L 13.0 9.0       Estimated Creatinine Clearance: 43.5 mL/min (A) (by C-G formula based on SCr of 2.02 mg/dL (H)).  Results from last 7 days   Lab Units 09/21/23  0325   MAGNESIUM mg/dL 2.1           Results from last 7 days   Lab Units 09/21/23 1956 09/21/23  0325   WBC 10*3/mm3 11.88* 8.42   HEMOGLOBIN g/dL 11.9* 11.1*   HEMATOCRIT % 33.9* 34.6*   PLATELETS 10*3/mm3 115* 124*       Results from last 7 days   Lab Units 09/21/23 1956   INR  1.16       Culture Data:   No results found for: BLOODCX  No results found for: URINECX  No results found for: RESPCX  No results found for: WOUNDCX  No results found for: STOOLCX  No components found for: BODYFLD    Radiology Data:   Imaging Results (Last 24 Hours)       Procedure Component Value Units Date/Time    XR Abdomen KUB [[id number]] Collected: 09/22/23 1351     Updated: 09/22/23 1355    Narrative:      Single view of the KUB area, compared to the study from one day before, shows  resolution of gastric distention after placement of the orogastric tube.    No distended loops of large and small bowel are seen.  No large amount of free  air is seen.  No abnormal soft tissue mass is seen.    Stable calcifications are seen in the pelvis.            I have reviewed the patient's current medications.     Assessment/Plan     Third degree heart block     S/p transvenous  pacemaker; pending PPM placement     Appreciated cardiologist assistance    Ileus      Due to above     Resolving; if stable by tomorrow, will discontinue NGT    Coffee ground emesis     Discussed with Dr Healy to evaluate patient     Protonix drip on     Altered mental status     Likely due to above     Resolved     Chronic medical problems     Essential hypertension     CKD stage III: it looks at his baseline      Medical Decision Making  Number and Complexity of problems: two major complexes  Differential Diagnosis: SBO    Conditions and Status:        Condition is improving.     MDM Data  External documents reviewed: previous medical records  My EKG interpretation: third degree block   My plain film interpretation: n/a  Tests considered but not ordered: none     Discussed with: RN and patient's grandson      Treatment Plan  See above    Care Planning  Shared decision making: his daughter   Code status and discussions: full code    Disposition  Social Determinants of Health that impact treatment or disposition: none  I expect the patient to be discharged: in progress    I confirmed that the patient's Advance Care Plan is present, code status is documented, or surrogate decision maker is listed in the patient's medical record.       I confirmed that the patient's Advance Care Plan is present, code status is documented, or surrogate decision maker is listed in the patient's medical record.     Feliberto Corbett MD

## 2023-09-22 NOTE — ANESTHESIA POSTPROCEDURE EVALUATION
Patient: Jovan Hardwick    Procedure Summary       Date: 09/22/23 Room / Location: [facility] ENDOSCOPY 1 / [facility] ENDOSCOPY    Anesthesia Start: 1655 Anesthesia Stop: 1710    Procedure: ESOPHAGOGASTRODUODENOSCOPY Diagnosis:       Coffee ground emesis      (Coffee ground emesis [K92.0])    Surgeons: Emery Healy MD Provider: Kathy Alva CRNA    Anesthesia Type: MAC ASA Status: 4 - Emergent            Anesthesia Type: MAC    Vitals  No vitals data found for the desired time range.          Post Anesthesia Care and Evaluation    Patient location during evaluation: ICU  Patient participation: complete - patient participated  Level of consciousness: sleepy but conscious  Pain score: 0  Pain management: adequate    Airway patency: patent  Anesthetic complications: No anesthetic complications  PONV Status: none  Cardiovascular status: acceptable  Respiratory status: acceptable  Hydration status: acceptable

## 2023-09-22 NOTE — ANESTHESIA PREPROCEDURE EVALUATION
Anesthesia Evaluation     Patient summary reviewed and Nursing notes reviewed   no history of anesthetic complications:   NPO Solid Status: > 8 hours  NPO Liquid Status: > 8 hours           Airway   Mallampati: II  TM distance: >3 FB  Neck ROM: full  Dental    (+) poor dentition    Pulmonary    (+) a smoker Current,sleep apnea, rhonchi  (-) asthma    PE comment: Duo-neb ordered pre-op   Cardiovascular   Exercise tolerance: poor (<4 METS)    ECG reviewed  Patient on routine beta blocker and Beta blocker given within 24 hours of surgery  Rhythm: regular  Rate: normal    (+) pacemaker, hypertension, valvular problems/murmurs AS, dysrhythmias Atrial Fib, Bradycardia, hyperlipidemia  (-) past MI, cardiac stents, CABG, DVT    ROS comment: EK23  Ventricular-paced rhythm  Abnormal ECG  When compared with ECG of 21-SEP-2023 03:14,  Electronic ventricular pacemaker has replaced Idioventricular rhythm  Vent. rate has increased BY  34 BPM      TTE: 23    Interpretation Summary       ·  Left ventricular systolic function is normal. Left ventricular ejection fraction appears to be 51 - 55%.  ·  Left ventricular wall thickness is consistent with mild concentric hypertrophy.  ·  Left ventricular diastolic function was indeterminate.  ·  The left atrial cavity is mildly dilated.  ·  Estimated right ventricular systolic pressure from tricuspid regurgitation is normal (<35 mmHg).  ·  The aortic valve is abnormal in structure. There is moderate calcification of the aortic valve. No significant aortic valve regurgitation is present. Mild aortic valve stenosis is present. Mild restriction to aortic valve opening.      Neuro/Psych  (-) seizures, TIA, CVA, dizziness/light headedness  GI/Hepatic/Renal/Endo    (+) obesity, renal disease ESRD and CRI, diabetes mellitus type 2 using insulin  (-) GERD, liver disease    Musculoskeletal     Abdominal   (+) obese   Substance History   (-) alcohol use, drug use     OB/GYN           Other        ROS/Med Hx Other: Hx: Sleep Apnea: CPAP compliant     T2DM: Last HgbA1C: 5.6    Hx: A-fib: Currenlty on Xarelto: 9/20/23.    Notre from Dr. Brown   Patient was given IV fluid and given the symptomatic complete heart block underwent transvenous temporary pacemaker by Dr. Doan prior to that external pacemaker. Patient currently pacing at rate of 60 bpm. The patient was bit confused and a little bit agitated he received some IV sedation. His grandson was present in the room who is a nurse by profession working the ER. Information obtained from the nurse Dr. Doan and the family. No chest pain was reported pain orthopnea PND. On metoprolol for atrial fibrillation but currently in sinus rhythm and amiodarone                  Anesthesia Plan    ASA 3     MAC   total IV anesthesia  (NG tube right nostril   Venous sheath right jugular   Duo-neb ordered pre-op   )  intravenous induction     Anesthetic plan, risks, benefits, and alternatives have been provided, discussed and informed consent has been obtained with: patient.      CODE STATUS:    Level Of Support Discussed With: Patient  Code Status (Patient has no pulse and is not breathing): CPR (Attempt to Resuscitate)  Medical Interventions (Patient has pulse or is breathing): Full Support

## 2023-09-22 NOTE — ANESTHESIA PREPROCEDURE EVALUATION
Anesthesia Evaluation     Patient summary reviewed and Nursing notes reviewed   no history of anesthetic complications:   NPO Solid Status: > 8 hours  NPO Liquid Status: > 8 hours           Airway   Mallampati: II  TM distance: >3 FB  Neck ROM: full  No difficulty expected  Dental    (+) poor dentition    Pulmonary    (+) a smoker Current,sleep apnea, rhonchi  (-) asthma    PE comment: Duo-neb ordered pre-op   Cardiovascular   Exercise tolerance: poor (<4 METS)    ECG reviewed  Patient on routine beta blocker and Beta blocker given within 24 hours of surgery  Rhythm: regular  Rate: normal    (+) pacemaker (placed today) pacemaker, hypertension, valvular problems/murmurs AS, dysrhythmias Atrial Fib, Bradycardia, hyperlipidemia  (-) past MI, cardiac stents, CABG, DVT    ROS comment: EK23  Ventricular-paced rhythm  Abnormal ECG  When compared with ECG of 21-SEP-2023 03:14,  Electronic ventricular pacemaker has replaced Idioventricular rhythm  Vent. rate has increased BY  34 BPM      TTE: 23    Interpretation Summary       ·  Left ventricular systolic function is normal. Left ventricular ejection fraction appears to be 51 - 55%.  ·  Left ventricular wall thickness is consistent with mild concentric hypertrophy.  ·  Left ventricular diastolic function was indeterminate.  ·  The left atrial cavity is mildly dilated.  ·  Estimated right ventricular systolic pressure from tricuspid regurgitation is normal (<35 mmHg).  ·  The aortic valve is abnormal in structure. There is moderate calcification of the aortic valve. No significant aortic valve regurgitation is present. Mild aortic valve stenosis is present. Mild restriction to aortic valve opening.      Neuro/Psych  (+) numbness  (-) seizures, TIA, CVA, dizziness/light headedness  GI/Hepatic/Renal/Endo    (+) obesity, morbid obesity, GI bleeding , renal disease ESRD and CRI, diabetes mellitus type 2 using insulin  (-) GERD, liver disease    Musculoskeletal      Abdominal   (+) obese   Substance History   (-) alcohol use, drug use     OB/GYN          Other        ROS/Med Hx Other: Hx: Sleep Apnea: CPAP compliant     T2DM: Last HgbA1C: 5.6    Hx: A-fib: Currenlty on Xarelto: 9/20/23.    Notre from Dr. Brown   Patient was given IV fluid and given the symptomatic complete heart block underwent transvenous temporary pacemaker by Dr. Doan prior to that external pacemaker. Patient currently pacing at rate of 60 bpm. The patient was bit confused and a little bit agitated he received some IV sedation. His grandson was present in the room who is a nurse by profession working the ER. Information obtained from the nurse Dr. Doan and the family. No chest pain was reported pain orthopnea PND. On metoprolol for atrial fibrillation but currently in sinus rhythm and amiodarone                  Anesthesia Plan    ASA 4 - emergent     MAC   total IV anesthesia  (NG tube right nostril   Venous sheath right jugular   Duo-neb ordered pre-op   )  intravenous induction     Anesthetic plan, risks, benefits, and alternatives have been provided, discussed and informed consent has been obtained with: patient.      CODE STATUS:    Level Of Support Discussed With: Patient  Code Status (Patient has no pulse and is not breathing): CPR (Attempt to Resuscitate)  Medical Interventions (Patient has pulse or is breathing): Full Support

## 2023-09-22 NOTE — ANESTHESIA POSTPROCEDURE EVALUATION
Patient: Jovan Hardwick    Procedure Summary       Date: 09/22/23 Room / Location: [facility] CATH/EP LAB 3 / [facility] CATH INVASIVE LOCATION    Anesthesia Start: 1416 Anesthesia Stop: 1614    Procedure: Pacemaker DC new (Left) Diagnosis:       CHB (complete heart block)      (CHB)    Providers: Elba Brown MD Provider: Raisa Ochoa CRNA    Anesthesia Type: MAC ASA Status: 3            Anesthesia Type: MAC    Vitals  No vitals data found for the desired time range.          Post Anesthesia Care and Evaluation    Patient location during evaluation: bedside  Patient participation: complete - patient participated  Level of consciousness: awake and alert  Pain management: adequate    Airway patency: patent  Anesthetic complications: No anesthetic complications  PONV Status: none  Cardiovascular status: acceptable  Respiratory status: acceptable and nasal cannula  Hydration status: acceptable    Comments:   187/77   94%  RR 16  T 98F

## 2023-09-23 LAB
ALBUMIN SERPL-MCNC: 2.6 G/DL (ref 3.5–5.2)
ALBUMIN/GLOB SERPL: 0.9 G/DL
ALP SERPL-CCNC: 48 U/L (ref 39–117)
ALT SERPL W P-5'-P-CCNC: 8 U/L (ref 1–41)
ANION GAP SERPL CALCULATED.3IONS-SCNC: 9 MMOL/L (ref 5–15)
AST SERPL-CCNC: 21 U/L (ref 1–40)
BASOPHILS # BLD AUTO: 0.02 10*3/MM3 (ref 0–0.2)
BASOPHILS NFR BLD AUTO: 0.2 % (ref 0–1.5)
BILIRUB SERPL-MCNC: 0.4 MG/DL (ref 0–1.2)
BUN SERPL-MCNC: 30 MG/DL (ref 8–23)
BUN/CREAT SERPL: 14.9 (ref 7–25)
CALCIUM SPEC-SCNC: 8.4 MG/DL (ref 8.6–10.5)
CHLORIDE SERPL-SCNC: 102 MMOL/L (ref 98–107)
CO2 SERPL-SCNC: 30 MMOL/L (ref 22–29)
CREAT SERPL-MCNC: 2.01 MG/DL (ref 0.76–1.27)
DEPRECATED RDW RBC AUTO: 52.1 FL (ref 37–54)
EGFRCR SERPLBLD CKD-EPI 2021: 34.6 ML/MIN/1.73
EOSINOPHIL # BLD AUTO: 0.02 10*3/MM3 (ref 0–0.4)
EOSINOPHIL NFR BLD AUTO: 0.2 % (ref 0.3–6.2)
ERYTHROCYTE [DISTWIDTH] IN BLOOD BY AUTOMATED COUNT: 13.7 % (ref 12.3–15.4)
GLOBULIN UR ELPH-MCNC: 2.8 GM/DL
GLUCOSE BLDC GLUCOMTR-MCNC: 116 MG/DL (ref 70–130)
GLUCOSE BLDC GLUCOMTR-MCNC: 135 MG/DL (ref 70–130)
GLUCOSE BLDC GLUCOMTR-MCNC: 156 MG/DL (ref 70–130)
GLUCOSE BLDC GLUCOMTR-MCNC: 88 MG/DL (ref 70–130)
GLUCOSE BLDC GLUCOMTR-MCNC: 98 MG/DL (ref 70–130)
GLUCOSE SERPL-MCNC: 144 MG/DL (ref 65–99)
HCT VFR BLD AUTO: 32.9 % (ref 37.5–51)
HGB BLD-MCNC: 10.8 G/DL (ref 13–17.7)
IMM GRANULOCYTES # BLD AUTO: 0.08 10*3/MM3 (ref 0–0.05)
IMM GRANULOCYTES NFR BLD AUTO: 0.8 % (ref 0–0.5)
LYMPHOCYTES # BLD AUTO: 0.47 10*3/MM3 (ref 0.7–3.1)
LYMPHOCYTES NFR BLD AUTO: 4.9 % (ref 19.6–45.3)
MCH RBC QN AUTO: 34.1 PG (ref 26.6–33)
MCHC RBC AUTO-ENTMCNC: 32.8 G/DL (ref 31.5–35.7)
MCV RBC AUTO: 103.8 FL (ref 79–97)
MONOCYTES # BLD AUTO: 0.78 10*3/MM3 (ref 0.1–0.9)
MONOCYTES NFR BLD AUTO: 8.1 % (ref 5–12)
NEUTROPHILS NFR BLD AUTO: 8.27 10*3/MM3 (ref 1.7–7)
NEUTROPHILS NFR BLD AUTO: 85.8 % (ref 42.7–76)
NRBC BLD AUTO-RTO: 0 /100 WBC (ref 0–0.2)
PLATELET # BLD AUTO: 108 10*3/MM3 (ref 140–450)
PMV BLD AUTO: 9.3 FL (ref 6–12)
POTASSIUM SERPL-SCNC: 4.1 MMOL/L (ref 3.5–5.2)
PROT SERPL-MCNC: 5.4 G/DL (ref 6–8.5)
RBC # BLD AUTO: 3.17 10*6/MM3 (ref 4.14–5.8)
SODIUM SERPL-SCNC: 141 MMOL/L (ref 136–145)
WBC NRBC COR # BLD: 9.64 10*3/MM3 (ref 3.4–10.8)

## 2023-09-23 PROCEDURE — 97162 PT EVAL MOD COMPLEX 30 MIN: CPT

## 2023-09-23 PROCEDURE — 63710000001 PREDNISONE PER 5 MG: Performed by: INTERNAL MEDICINE

## 2023-09-23 PROCEDURE — 25010000002 MORPHINE PER 10 MG: Performed by: INTERNAL MEDICINE

## 2023-09-23 PROCEDURE — 82948 REAGENT STRIP/BLOOD GLUCOSE: CPT

## 2023-09-23 PROCEDURE — 97166 OT EVAL MOD COMPLEX 45 MIN: CPT

## 2023-09-23 PROCEDURE — 85025 COMPLETE CBC W/AUTO DIFF WBC: CPT | Performed by: FAMILY MEDICINE

## 2023-09-23 PROCEDURE — 80053 COMPREHEN METABOLIC PANEL: CPT | Performed by: FAMILY MEDICINE

## 2023-09-23 RX ADMIN — HYDROXYCHLOROQUINE SULFATE 200 MG: 200 TABLET, FILM COATED ORAL at 08:11

## 2023-09-23 RX ADMIN — ROPINIROLE 1 MG: 1 TABLET, FILM COATED ORAL at 20:26

## 2023-09-23 RX ADMIN — SODIUM CHLORIDE 8 MG/HR: 900 INJECTION INTRAVENOUS at 21:21

## 2023-09-23 RX ADMIN — Medication 1000 UNITS: at 08:11

## 2023-09-23 RX ADMIN — MORPHINE SULFATE 2 MG: 2 INJECTION, SOLUTION INTRAMUSCULAR; INTRAVENOUS at 15:40

## 2023-09-23 RX ADMIN — SODIUM CHLORIDE 8 MG/HR: 900 INJECTION INTRAVENOUS at 16:44

## 2023-09-23 RX ADMIN — SODIUM CHLORIDE 8 MG/HR: 900 INJECTION INTRAVENOUS at 05:08

## 2023-09-23 RX ADMIN — AMLODIPINE BESYLATE 5 MG: 5 TABLET ORAL at 08:11

## 2023-09-23 RX ADMIN — FLUOXETINE 10 MG: 10 CAPSULE ORAL at 08:11

## 2023-09-23 RX ADMIN — Medication 5 MG: at 20:26

## 2023-09-23 RX ADMIN — PREDNISONE 5 MG: 5 TABLET ORAL at 08:11

## 2023-09-23 RX ADMIN — LOSARTAN POTASSIUM 100 MG: 50 TABLET, FILM COATED ORAL at 08:11

## 2023-09-23 RX ADMIN — SODIUM CHLORIDE 8 MG/HR: 900 INJECTION INTRAVENOUS at 12:50

## 2023-09-23 RX ADMIN — MORPHINE SULFATE 2 MG: 2 INJECTION, SOLUTION INTRAMUSCULAR; INTRAVENOUS at 11:14

## 2023-09-23 RX ADMIN — Medication 10 ML: at 08:11

## 2023-09-23 RX ADMIN — TAMSULOSIN HYDROCHLORIDE 0.8 MG: 0.4 CAPSULE ORAL at 20:26

## 2023-09-23 RX ADMIN — BUMETANIDE 2 MG: 1 TABLET ORAL at 08:10

## 2023-09-23 RX ADMIN — Medication 10 ML: at 08:12

## 2023-09-23 RX ADMIN — HYDROCODONE BITARTRATE AND ACETAMINOPHEN 1 TABLET: 5; 325 TABLET ORAL at 21:19

## 2023-09-23 RX ADMIN — CANAGLIFLOZIN 300 MG: 300 TABLET, FILM COATED ORAL at 08:11

## 2023-09-23 NOTE — PLAN OF CARE
Problem: Adult Inpatient Plan of Care  Goal: Plan of Care Review  Recent Flowsheet Documentation  Taken 9/23/2023 1601 by Eli Dawson PT  Plan of Care Reviewed With: patient  Outcome Evaluation: PT evaluaton completed. Co-eval with OT. pt alert, oriented x4, R shoulder immobilizer donned s/p pacemaker placement. Pt performed supine to sit with moderate assistance of 2 and sit to stand to sit with min assistance. Patient ambulated 5ft to chair with min assistance. Patient instructed in pacemaker precautions and aknowledged understanding. Function limited by decreased strength, balance, and tolerance for functional mobility and activities. Patient will benefit from PT to regain lost function as patient improves medically. Anticipate home with assist at discharge.   Goal Outcome Evaluation:  Plan of Care Reviewed With: patient           Outcome Evaluation: PT evaluaton completed. Co-eval with OT. pt alert, oriented x4, R shoulder immobilizer donned s/p pacemaker placement. Pt performed supine to sit with moderate assistance of 2 and sit to stand to sit with min assistance. Patient ambulated 5ft to chair with min assistance. Patient instructed in pacemaker precautions and aknowledged understanding. Function limited by decreased strength, balance, and tolerance for functional mobility and activities. Patient will benefit from PT to regain lost function as patient improves medically. Anticipate home with assist at discharge.      Anticipated Discharge Disposition (PT): home with assist

## 2023-09-23 NOTE — THERAPY EVALUATION
Patient Name: Jovan Hardwick  : 1950    MRN: [id number]                              Today's Date: 2023       Admit Date: 2023    Visit Dx:     ICD-10-CM ICD-9-CM   1. Third degree heart block  I44.2 426.0   2. CHB (complete heart block)  I44.2 426.0   3. Coffee ground emesis  K92.0 578.0   4. Impaired mobility and activities of daily living [Z74.09, Z78.9 (ICD-10-CM)]  Z74.09 V49.89    Z78.9    5. Impaired functional mobility, balance, gait, and endurance [Z74.09 (ICD-10-CM)]  Z74.09 V49.89     Patient Active Problem List   Diagnosis    Type 2 diabetes mellitus with hypoglycemia without coma, with long-term current use of insulin    Mixed hyperlipidemia    Essential hypertension    Lupus    Class 2 obesity without serious comorbidity with body mass index (BMI) of 36.0 to 36.9 in adult    Smoking    Diabetic polyneuropathy associated with type 2 diabetes mellitus    Stage 3b chronic kidney disease    Third degree heart block    Macrocytic anemia    Elevated troponin    Hypertension    Coffee ground emesis     Past Medical History:   Diagnosis Date    Diabetic neuropathy     Elevated blood pressure     Hypercholesterolemia     Lupus erythematosus     Tobacco user     Type 2 diabetes mellitus with hyperglycemia     Type 2 diabetes mellitus, uncontrolled      Past Surgical History:   Procedure Laterality Date    CARDIAC CATHETERIZATION  2014    No epicardial coronary artery disease. Normal LV systolic function. EF 55%. No wall motion abnormalities. Systemic hypertension.      General Information       Row Name 23 1601          Physical Therapy Time and Intention    Document Type evaluation  -OSBALDO     Mode of Treatment co-treatment;physical therapy;occupational therapy  -OSBALDO       Row Name 23 1601          General Information    Patient Profile Reviewed yes  -OSBALDO     Prior Level of Function independent:;gait;transfer;ADL's;dependent:;driving  -OSBALDO     Existing Precautions/Restrictions  fall;oxygen therapy device and L/min;pacemaker  -OSBALDO       Row Name 09/23/23 1601          Cognition    Orientation Status (Cognition) oriented x 4  -OSBALDO       Row Name 09/23/23 1601          Safety Issues, Functional Mobility    Impairments Affecting Function (Mobility) endurance/activity tolerance;strength;balance  -OSBALDO               User Key  (r) = Recorded By, (t) = Taken By, (c) = Cosigned By      Initials Name Provider Type    Eli Irene PT Physical Therapist                   Mobility       Row Name 09/23/23 1601          Bed Mobility    Bed Mobility supine-sit  -OSBALDO     Supine-Sit Lynn (Bed Mobility) moderate assist (50% patient effort);2 person assist  -OSBALDO     Assistive Device (Bed Mobility) bed rails;head of bed elevated  -OSBALDO       Row Name 09/23/23 1601          Bed-Chair Transfer    Bed-Chair Lynn (Transfers) minimum assist (75% patient effort);1 person assist;1 person to manage equipment  -OSBALDO       Row Name 09/23/23 1601          Sit-Stand Transfer    Sit-Stand Lynn (Transfers) minimum assist (75% patient effort)  -OSBALDO       Row Name 09/23/23 1601          Gait/Stairs (Locomotion)    Lynn Level (Gait) minimum assist (75% patient effort);1 person assist;1 person to manage equipment  -OSBALDO     Distance in Feet (Gait) 5ft to chair  -OSBALDO               User Key  (r) = Recorded By, (t) = Taken By, (c) = Cosigned By      Initials Name Provider Type    Eli Irene PT Physical Therapist                   Obj/Interventions       Row Name 09/23/23 1816          Range of Motion Comprehensive    Comment, General Range of Motion BLE: AROM  WFL  -OSBALDO       Row Name 09/23/23 1816          Strength Comprehensive (MMT)    Comment, General Manual Muscle Testing (MMT) Assessment BLE: grossly 4/5  -OSBALDO       Row Name 09/23/23 1816          Sensory Assessment (Somatosensory)    Sensory Assessment (Somatosensory) LE sensation intact  -               User Key  (r) = Recorded By, (t) =  Taken By, (c) = Cosigned By      Initials Name Provider Type    Eli Irene, PT Physical Therapist                   Goals/Plan       Row Name 09/23/23 1601          Bed Mobility Goal 1 (PT)    Activity/Assistive Device (Bed Mobility Goal 1, PT) bed mobility activities, all  -OSBALDO     [place]ps Level/Cues Needed (Bed Mobility Goal 1, PT) independent  -OSBALDO     Time Frame (Bed Mobility Goal 1, PT) by discharge  -OSBALDO     Progress/Outcomes (Bed Mobility Goal 1, PT) goal not met  -OSBALDO       Row Name 09/23/23 1601          Transfer Goal 1 (PT)    Activity/Assistive Device (Transfer Goal 1, PT) sit-to-stand/stand-to-sit;bed-to-chair/chair-to-bed  -OSBALDO     [place]ps Level/Cues Needed (Transfer Goal 1, PT) modified independence;independent  -OSBALDO     Time Frame (Transfer Goal 1, PT) by discharge  -OSBALDO     Progress/Outcome (Transfer Goal 1, PT) goal not met  -OSBALDO       Row Name 09/23/23 1601          Gait Training Goal 1 (PT)    Activity/Assistive Device (Gait Training Goal 1, PT) gait (walking locomotion);decrease fall risk;increase endurance/gait distance  -OSBALDO     [place]ps Level (Gait Training Goal 1, PT) modified independence;independent  -OSBALDO     Distance (Gait Training Goal 1, PT) 300ft each trip  -OSBALDO     Time Frame (Gait Training Goal 1, PT) 3 days  -OSBALDO     Progress/Outcome (Gait Training Goal 1, PT) goal not met  -OSBALDO       Row Name 09/23/23 1601          Stairs Goal 1 (PT)    Activity/Assistive Device (Stairs Goal 1, PT) ascending stairs;descending stairs;using handrail, left  -OSBALDO     [place]ps Level/Cues Needed (Stairs Goal 1, PT) modified independence  -OSBALDO     Number of Stairs (Stairs Goal 1, PT) 5  -OSBALDO     Time Frame (Stairs Goal 1, PT) by discharge  -OSBALDO     Progress/Outcome (Stairs Goal 1, PT) goal not met  -OSBALDO       Row Name 09/23/23 1601          Therapy Assessment/Plan (PT)    Planned Therapy Interventions (PT) balance training;bed mobility training;gait training;home exercise program;patient/family  education;stair training;strengthening;transfer training  -               User Key  (r) = Recorded By, (t) = Taken By, (c) = Cosigned By      Initials Name Provider Type    Eli Irene PT Physical Therapist                   Clinical Impression       Row Name 09/23/23 1601          Pain    Pretreatment Pain Rating 0/10 - no pain  -OSBALDO     Posttreatment Pain Rating 0/10 - no pain  -     Additional Documentation Pain Scale: Numbers Pre/Post-Treatment (Group)  -[facility] Name 09/23/23 1601          Plan of Care Review    Plan of Care Reviewed With patient  -     Outcome Evaluation PT evaluaton completed. Co-eval with OT. pt alert, oriented x4, R shoulder immobilizer donned s/p pacemaker placement. Pt performed supine to sit with moderate assistance of 2 and sit to stand to sit with min assistance. Patient ambulated 5ft to chair with min assistance. Patient instructed in pacemaker precautions and aknowledged understanding. Function limited by decreased strength, balance, and tolerance for functional mobility and activities. Patient will benefit from PT to regain lost function as patient improves medically. Anticipate home with assist at discharge.  -       Row Name 09/23/23 1601          Therapy Assessment/Plan (PT)    Patient/Family Therapy Goals Statement (PT) return to [facility]  -     Rehab Potential (PT) good, to achieve stated therapy goals  -     Criteria for Skilled Interventions Met (PT) yes;meets criteria;skilled treatment is necessary  -     Therapy Frequency (PT) other (see comments)  daily  -     Predicted Duration of Therapy Intervention (PT) until discharge or goals met  -       Row Name 09/23/23 1601          Vital Signs    Pre Systolic BP Rehab 133  -     Pre Treatment Diastolic BP 60  -     Post Systolic BP Rehab 165  -OSBALDO     Post Treatment Diastolic BP 74  -OSBALDO     Pretreatment Heart Rate (beats/min) 86  -OSBALDO     Intratreatment Heart Rate (beats/min) 99  -OSBALDO     Posttreatment  Heart Rate (beats/min) 90  -OSBALDO     Pre SpO2 (%) 93  -OSBALDO     O2 Delivery Pre Treatment nasal cannula  2lpm  -OSBALDO     Intra SpO2 (%) 90  -OSBALDO     O2 Delivery Intra Treatment nasal cannula  -OSBALDO     Post SpO2 (%) 95  -OSBALDO     O2 Delivery Post Treatment nasal cannula  -OSBALDO     Pre Patient Position Supine  -OSBALDO     Intra Patient Position Sitting  -OSBALDO     Post Patient Position Sitting  -OSBALDO       Row Name 09/23/23 1601          Positioning and Restraints    Pre-Treatment Position in bed  -OSBALDO     Post Treatment Position chair  -OSBALDO     In Bed sitting;call light within reach;encouraged to call for assist;notified nsg  -OSBALDO               User Key  (r) = Recorded By, (t) = Taken By, (c) = Cosigned By      Initials Name Provider Type    Eli Irene, PT Physical Therapist                   Outcome Measures       Row Name 09/23/23 1601 09/23/23 0810       How much help from another person do you currently need...    Turning from your back to your side while in flat bed without using bedrails? 2  -OSBALDO 2  -LB    Moving from lying on back to sitting on the side of a flat bed without bedrails? 2  -OSBALDO 2  -LB    Moving to and from a bed to a chair (including a wheelchair)? 3  -OSBALDO 2  -LB    Standing up from a chair using your arms (e.g., wheelchair, bedside chair)? 3  -OSBALDO 2  -LB    Climbing 3-5 steps with a railing? 2  -OSBALDO 1  -LB    To walk in hospital room? 3  -OSBALDO 2  -LB    AM-PAC 6 Clicks Score (PT) 15  -OSBALDO 11  -LB    Highest level of mobility 4 --> Transferred to chair/commode  -OSBALDO 4 --> Transferred to chair/commode  -LB      Row Name 09/23/23 1601 09/23/23 1600       Functional Assessment    Outcome Measure Options AM-PAC 6 Clicks Basic Mobility (PT)  -OSBALDO AM-PAC 6 Clicks Daily Activity (OT)  -RB              User Key  (r) = Recorded By, (t) = Taken By, (c) = Cosigned By      Initials Name Provider Type    RB Waldemar Woody, OT Occupational Therapist    Eli Irene, PT Physical Therapist    Ivone Vinson, RN Registered Nurse                                  Physical Therapy Education       Title: PT OT SLP Therapies (In Progress)       Topic: Physical Therapy (In Progress)       Point: Mobility training (In Progress)       Learning Progress Summary             Patient Acceptance, E, NR by OSBALDO at 9/23/2023 1827                         Point: Home exercise program (Not Started)       Learner Progress:  Not documented in this visit.              Point: Body mechanics (In Progress)       Learning Progress Summary             Patient Acceptance, E, NR by OSBALDO at 9/23/2023 1827                         Point: Precautions (In Progress)       Learning Progress Summary             Patient Acceptance, E, NR by OSBALDO at 9/23/2023 1827                                         User Key       Initials Effective Dates Name Provider Type Discipline     06/16/21 -  Eli Dawson, PT Physical Therapist PT                  PT Recommendation and Plan  Planned Therapy Interventions (PT): balance training, bed mobility training, gait training, home exercise program, patient/family education, stair training, strengthening, transfer training  Plan of Care Reviewed With: patient  Outcome Evaluation: PT evaluaton completed. Co-eval with OT. pt alert, oriented x4, R shoulder immobilizer donned s/p pacemaker placement. Pt performed supine to sit with moderate assistance of 2 and sit to stand to sit with min assistance. Patient ambulated 5ft to chair with min assistance. Patient instructed in pacemaker precautions and aknowledged understanding. Function limited by decreased strength, balance, and tolerance for functional mobility and activities. Patient will benefit from PT to regain lost function as patient improves medically. Anticipate home with assist at discharge.     Time Calculation:   PT Evaluation Complexity  History, PT Evaluation Complexity: 3 or more personal factors and/or comorbidities  Examination of Body Systems (PT Eval Complexity): total of 3 or more  elements  Clinical Presentation (PT Evaluation Complexity): evolving  Clinical Decision Making (PT Evaluation Complexity): moderate complexity  Overall Complexity (PT Evaluation Complexity): moderate complexity     PT Charges       Row Name 09/23/23 1828             Time Calculation    Start Time 1600  -OSBALDO      Stop Time 1639  -OSBALDO      Time Calculation (min) 39 min  -OSBALDO      PT Received On 09/23/23  -OSBALDO      PT Goal Re-Cert Due Date 10/06/23  -OSBALDO         Time Calculation- PT    Total Timed Code Minutes- PT 0 minute(s)  -OSBALDO         Untimed Charges    PT Eval/Re-eval Minutes 39  -OSBALDO         Total Minutes    Untimed Charges Total Minutes 39  -OSBALDO       Total Minutes 39  -OSBALDO                User Key  (r) = Recorded By, (t) = Taken By, (c) = Cosigned By      Initials Name Provider Type    Eli Irene, PT Physical Therapist                  Therapy Charges for Today       Code Description Service Date Service Provider Modifiers Qty    42962030722 HC PT EVAL MOD COMPLEXITY 3 9/23/2023 Eli Dawson, PT GP 1            PT G-Codes  Outcome Measure Options: AM-PAC 6 Clicks Basic Mobility (PT)  AM-PAC 6 Clicks Score (PT): 15  AM-PAC 6 Clicks Score (OT): 15  PT Discharge Summary  Anticipated Discharge Disposition (PT): home with assist    Eli Dawson, CE  9/23/2023

## 2023-09-23 NOTE — PLAN OF CARE
Goal Outcome Evaluation:  Plan of Care Reviewed With: patient   OT eval on this date as co-eval with PT.  Pt was mod A of 2 for supine to sit and min A for sit to stand to sit.  Pt required min A for 3' ambulation to recliner.  He was mod/max A for doffing sock.  Pt could benefit form OT services to increase independence and safety with ADLs and functional mobility.               Anticipated Discharge Disposition (OT): home with assist

## 2023-09-23 NOTE — PROGRESS NOTES
[facility] Cardiology  INPATIENT PROGRESS NOTE    Name: Jovan Hardwick  Age/Sex: 72 y.o. male  :  1950        PCP: Maisha Toure MD    Vital Signs  Temp:  [97 °F (36.1 °C)-98.8 °F (37.1 °C)] 98.8 °F (37.1 °C)  Heart Rate:  [60-98] 90  Resp:  [16-24] 16  BP: (119-215)/(58-96) 132/58  Body mass index is 31.5 kg/m².      Subjective   Patient reports he is doing well today he denies any chest pain shortness of breath palpitations lightheadedness or dizziness.  He underwent successful pacemaker placement yesterday as well as an EGD yesterday.      Third degree heart block    Stage 3b chronic kidney disease    Macrocytic anemia    Elevated troponin    Hypertension    Coffee ground emesis      Past Medical History:   Diagnosis Date    Diabetic neuropathy     Elevated blood pressure     Hypercholesterolemia     Lupus erythematosus     Tobacco user     Type 2 diabetes mellitus with hyperglycemia     Type 2 diabetes mellitus, uncontrolled        Current Facility-Administered Medications   Medication Dose Route Frequency Provider Last Rate Last Admin    acetaminophen (TYLENOL) tablet 650 mg  650 mg Oral Q4H PRN Elba Brown MD        Or    acetaminophen (TYLENOL) 160 MG/5ML oral solution 650 mg  650 mg Oral Q4H PRN Elba Borwn MD        Or    acetaminophen (TYLENOL) suppository 650 mg  650 mg Rectal Q4H PRN Elba Brown MD        amLODIPine (NORVASC) tablet 5 mg  5 mg Oral Q24H Elba Brown MD   5 mg at 23 0811    bumetanide (BUMEX) tablet 2 mg  2 mg Oral Daily Elba Brown MD   2 mg at 23 0810    Canagliflozin (INVOKANA) 300 MG tablet tablet 300 mg  300 mg Oral Daily Elba Brown MD   300 mg at 23 0811    cholecalciferol (VITAMIN D3) tablet 1,000 Units  1,000 Units Oral Daily Elba Brown MD   1,000 Units at 23 0811    dextrose (D50W) (25 g/50 mL) IV injection 25 g  25 g Intravenous Q15 Min PRN Elba Brown MD        dextrose (GLUTOSE)  oral gel 15 g  15 g Oral Q15 Min PRN Elba Brown MD        dextrose 5 % and sodium chloride 0.45 % infusion  30 mL/hr Intravenous Continuous PRN Emery Healy MD        FLUoxetine (PROzac) capsule 10 mg  10 mg Oral Daily Elba Brown MD   10 mg at 09/23/23 0811    gentamicin (GARAMYCIN) 80 mg in sodium chloride 0.9 % 100 mL IVPB  80 mg Intravenous Once Elba Brown MD        glucagon HCl (Diagnostic) injection 1 mg  1 mg Intramuscular Q15 Min PRN Elba Brown MD        haloperidol lactate (HALDOL) injection 3 mg  3 mg Intramuscular Once Elba Brown MD        hydrALAZINE (APRESOLINE) injection 20 mg  20 mg Intravenous Q6H PRN Elba Brown MD        HYDROcodone-acetaminophen (NORCO) 5-325 MG per tablet 1 tablet  1 tablet Oral Q6H PRN Elba Brown MD        hydroxychloroquine (PLAQUENIL) tablet 200 mg  200 mg Oral Daily Elba Brown MD   200 mg at 09/23/23 0811    Insulin Aspart (novoLOG) injection 0-14 Units  0-14 Units Subcutaneous TID AC Elba Brown MD        losartan (COZAAR) tablet 100 mg  100 mg Oral Daily Elba Brown MD   100 mg at 09/23/23 0811    melatonin tablet 5 mg  5 mg Oral Nightly PRN Elba Brown MD        morphine injection 2 mg  2 mg Intravenous Q4H PRN Elba Brown MD   2 mg at 09/22/23 2344    nitroglycerin (NITROSTAT) SL tablet 0.4 mg  0.4 mg Sublingual Q5 Min PRN Elba Brown MD        ondansetron (ZOFRAN) injection 4 mg  4 mg Intravenous Q6H PRN Elba Brown MD        ondansetron (ZOFRAN) tablet 4 mg  4 mg Oral Q6H PRN Elba Brown MD        pantoprazole (PROTONIX) 40 mg in 100mL NS IVPB  8 mg/hr Intravenous Continuous Elba Brown MD 20 mL/hr at 09/23/23 0508 8 mg/hr at 09/23/23 0508    predniSONE (DELTASONE) tablet 5 mg  5 mg Oral Daily Elba Brown MD   5 mg at 09/23/23 0811    rOPINIRole (REQUIP) tablet 1 mg  1 mg Oral Nightly Elba Brown MD   1 mg at 09/22/23 2040    sodium chloride 0.9 % flush 10 mL  10 mL  Intravenous PRN Elba Brown MD        sodium chloride 0.9 % flush 10 mL  10 mL Intravenous Q12H Elba Brown MD   10 mL at 09/23/23 0812    sodium chloride 0.9 % flush 10 mL  10 mL Intravenous PRN Elba Brown MD        sodium chloride 0.9 % flush 10 mL  10 mL Intravenous Q12H Elba Brown MD   10 mL at 09/23/23 0811    sodium chloride 0.9 % flush 10 mL  10 mL Intravenous PRN Elba Brown MD        sodium chloride 0.9 % infusion 40 mL  40 mL Intravenous PRN Elba Brown MD        sodium chloride 0.9 % infusion 40 mL  40 mL Intravenous PRN Elba Brown MD        tamsulosin (FLOMAX) 24 hr capsule 0.8 mg  0.8 mg Oral Nightly Elba Brown MD   0.8 mg at 09/22/23 2040       Past Surgical History:   Procedure Laterality Date    CARDIAC CATHETERIZATION  03/26/2014    No epicardial coronary artery disease. Normal LV systolic function. EF 55%. No wall motion abnormalities. Systemic hypertension.       Social History     Socioeconomic History    Marital status:    Tobacco Use    Smoking status: Every Day     Packs/day: 0.50     Years: 15.00     Pack years: 7.50     Types: Cigarettes    Smokeless tobacco: Never    Tobacco comments:     encouraged smoking cessation    Vaping Use    Vaping Use: Never used   Substance and Sexual Activity    Alcohol use: No    Drug use: Never         Physical Exam  Constitutional:       Appearance: Normal appearance.   HENT:      Head: Atraumatic.   Eyes:      Extraocular Movements: Extraocular movements intact.   Cardiovascular:      Rate and Rhythm: Regular rhythm.      Heart sounds: Normal heart sounds.      Comments: Pacemaker incision site clean dry intact with no hematoma  Pulmonary:      Breath sounds: Normal breath sounds.   Abdominal:      Palpations: Abdomen is soft.   Skin:     General: Skin is warm.   Neurological:      General: No focal deficit present.      Mental Status: He is alert.   Psychiatric:         Mood and Affect: Mood normal.          Behavior: Behavior normal.           DATA REVIEWED:   Telemetry review: Paced rhythm with occasional PVCs and couplets  ECG/EMG Results (all)       Procedure Component Value Units Date/Time    ECG 12 Lead [[id number]] Resulted: 09/21/23 0600     Updated: 09/21/23 0600    ECG 12 Lead Bradycardia [[id number]] Collected: 09/21/23 0644     Updated: 09/21/23 0719     QT Interval 454 ms      QTC Interval 460 ms     Narrative:      Test Reason : Bradycardia  Blood Pressure :   */*   mmHG  Vent. Rate :  62 BPM     Atrial Rate :  65 BPM     P-R Int :   * ms          QRS Dur : 170 ms      QT Int : 454 ms       P-R-T Axes :  55 -29  88 degrees     QTc Int : 460 ms    Ventricular-paced rhythm  Abnormal ECG  When compared with ECG of 21-SEP-2023 03:14,  Electronic ventricular pacemaker has replaced Idioventricular rhythm  Vent. rate has increased BY  34 BPM    Referred By:            Confirmed By:     ECG 12 Lead Bradycardia [[id number]] Collected: 09/21/23 0314     Updated: 09/21/23 0721     QT Interval 558 ms      QTC Interval 380 ms     Narrative:      Test Reason : Bradycardia  Blood Pressure :   */*   mmHG  Vent. Rate :  28 BPM     Atrial Rate :  27 BPM     P-R Int :   * ms          QRS Dur : 146 ms      QT Int : 558 ms       P-R-T Axes :   * -72  76 degrees     QTc Int : 380 ms    Idioventricular rhythm  Left axis deviation  Right bundle branch block  Left ventricular hypertrophy with QRS widening and repolarization abnormality  Abnormal ECG  When compared with ECG of 25-MAR-2014 14:34,  Idioventricular rhythm has replaced Sinus rhythm  Vent. rate has decreased BY  36 BPM    Referred By:            Confirmed By:     SCANNED EKG [[id number]] Resulted: 09/21/23     Updated: 09/21/23 1334    Adult Transthoracic Echo Limited W/ Cont if Necessary Per Protocol [[id number]] Resulted: 09/21/23 1516     Updated: 09/21/23 1520     EF(MOD-bp) 66.5 %      LVIDd 5.1 cm      LVIDs 3.9 cm      IVSd 1.05 cm      LVPWd 1.36 cm      FS 22.7 %       IVS/LVPW 0.77 cm      ESV(cubed) 59.9 ml      LV Sys Vol (BSA corrected) 31.8 cm2      EDV(cubed) 130.0 ml      LV [place] Vol (BSA corrected) 95.9 cm2      LVOT area 3.7 cm2      LV mass(C)d 240.7 grams      LVOT diam 2.17 cm      EDV(MOD-sp2) 210.0 ml      EDV(MOD-sp4) 226.0 ml      ESV(MOD-sp2) 73.6 ml      ESV(MOD-sp4) 74.9 ml      SV(MOD-sp2) 136.4 ml      SV(MOD-sp4) 151.1 ml      SI(MOD-sp2) 57.9 ml/m2      SI(MOD-sp4) 64.1 ml/m2      EF(MOD-sp2) 65.0 %      EF(MOD-sp4) 66.9 %      MV E max dustin 125.0 cm/sec      MV A max dustin 138.9 cm/sec      MV E/A 0.90     LA ESV Index (BP) 30.5 ml/m2      SV(LVOT) 131.5 ml      RVIDd 4.0 cm      TAPSE (>1.6) 2.33 cm      LA dimension (2D)  3.5 cm      LV V1 max 157.2 cm/sec      LV V1 max PG 9.9 mmHg      LV V1 mean PG 5.3 mmHg      LV V1 VTI 35.5 cm      Ao pk dustin 278.3 cm/sec      Ao max PG 31.0 mmHg      Ao mean PG 16.2 mmHg      Ao V2 VTI 48.7 cm      DAVID(I,D) 2.7 cm2      MV max PG 8.6 mmHg      MV mean PG 2.7 mmHg      MV V2 VTI 49.1 cm      MV P1/2t 53.8 msec      MVA(P1/2t) 4.1 cm2      MVA(VTI) 2.7 cm2      MV dec slope 790.8 cm/sec2      TR max dustin 238.9 cm/sec      TR max PG 22.8 mmHg      RVSP(TR) 27.8 mmHg      RAP systole 5.0 mmHg      PA V2 max 173.4 cm/sec      Ao root diam 2.7 cm      ACS 1.83 cm     Narrative:        Left ventricular systolic function is normal. Left ventricular ejection   fraction appears to be 51 - 55%.    Left ventricular wall thickness is consistent with mild concentric   hypertrophy.    Left ventricular diastolic function was indeterminate.    The left atrial cavity is mildly dilated.    Estimated right ventricular systolic pressure from tricuspid   regurgitation is normal (<35 mmHg).    The aortic valve is abnormal in structure. There is moderate   calcification of the aortic valve. No significant aortic valve   regurgitation is present. Mild aortic valve stenosis is present. Mild   restriction to aortic valve opening.      EP/CRM  Study [[id number]] Resulted: 09/22/23 1218     Updated: 09/22/23 1218    Narrative:      Placement of temporary venous pacemaker.    : Poornima Doan MD., FAC, FACP, [facility] RPVI    INDICATION: Third-degree heart block    Procedure:  After risks, benefits, and alternatives of the above-mentioned   procedure were explained to the patient  detail, informed consent was   obtained both verbally and in writing. The patient was taken to Cardiac   Catheterization Suite where the RUE was prepped and draped in the usual   sterile fashion. The right internal jugular region was prepped and draped   in the usual sterile fashion. 2% lidocaine solution was used to infiltrate   the skin overlying the right internal jugular vein. Once adequate   anesthesia has been obtained, a thin-walled #18 gauge Argon needle was   used to cannulate the right internal jugular vein with ultrasound   guidance. A steel guidewire was then inserted through the needle into the   vessel without resistance. Small nick was then made in the skin and the   needle was removed. An 6 [place] venous sheath was then advanced over the   guidewire into the vascular lumen without resistance. The guidewire and   dilator were then removed. The sheath was then flushed.  A balloontipped   temporary venous pacemaker wire was advanced into the RV successful   capture.  Patient tolerated the procedure well.  Surgical sheath in place.  TVP was sutured in place at 35 cm.    Complications: None    Blood loss: Less than 5 mL    Specimen: None    Assessment:  Access with placement of TVP via right IJ    Recommendations:  Chest x-ray  Admit to CCU  Permanent pacemaker in the morning    SCANNED EKG [[id number]] Resulted: 09/21/23     Updated: 09/22/23 1327    SCANNED EKG [[id number]] Resulted: 09/21/23     Updated: 09/22/23 1328    SCANNED EKG [[id number]] Resulted: 09/21/23     Updated: 09/22/23 1330    EP/CRM Study [[id number]] Resulted: 09/22/23 1611     Updated: 09/22/23  1614    Narrative:      Date of Procedure: 09/22/23    Referring Physician: Dr. Doan    /ELECTROPHYSIOLOGIST: Dr. Brown    PROCEDURE(S) PERFORMED:    Implantation of a dual-chamber permanent pacemaker.       INDICATIONS FOR PROCDEDURE: Symptomatic complete heart block requiring   transvenous temporary pacing    MEDICATION(S) GIVEN TO PATIENT DURING THIS PROCEDURE:  IV conscious sedation by anesthesia.  Gentamicin  Ancef.    DESCRIPTION(S) OF THE PROCEDURE: The patient was brought to the   cardiovascular laboratory in a fasting, non-sedated state. The risks and   benefits of conscious sedation and implantation of dual-chamber permanent   pacemaker were explained to the patient and written, informed consent was   obtained. The patient was then prepped and draped in the usual sterile   manner. The right infraclavicular fossa area was then anesthetized with 1%   Lidocaine and the skin was incised using a scalpel. The pocket was created   along the prepectoralis fascia using both sharp and blunt dissection.   Access to the left subclavian vein was then obtained x2 using the modified   Seldinger technique and two, 7-[place] sheaths were passed over 2   guidewires and advanced to the left subclavian vein. The right ventricular   and right atrial leads were then passed through the sheaths and advanced   to the low right ventricular septum and high right atrium where they were   screwed in position. The right ventricular lead pacing threshold was   measured at 0.5 volts at 0.5 milliseconds with an R wave measured at 6   millivolts and a lead impedance measured at 680 ohms. The right atrial   lead pacing threshold measured at 0.5 volts at 0.5 milliseconds with a P   wave measured at 2.9 millivolts and a lead impedance measured at 360 ohms.   The leads were attached to the prepectoralis fascia using a 2-0 silk and a   sewing ring. The leads were attached to the generator, and the generator   was placed in the pocket  after the pocket was irrigated with Ancef   solution. The pocket was closed with 2-0 Vicryl, and the skin was closed   with 3-0 Vicryl and 4-0 Vicryl. Steri-Strips and a pressure dressing were   then applied.  Right atrial lead  is 2088 TC/46 and serial   number [id number].  Implantation date 9/22/2023.  Right ventricular lead    is Abbott model #2088 TC/52 and serial number EE [id number].    Implantation date 9/22/2023.  Lead status with the pacemaker.    's Abbott model number PM 2272 and serial #[id number].    Implantation date 9/22/2023  Transvenous temporary pacemaker was removed and hemostasis was achieved.    COMPLICATIONS: None  ESTIMATED BLOOD LOSS: Minimal  SPECIMENS: None    FINAL PROGRAM PARAMETERS: The device was set at DDDR with a lower rate of   60 beats per minute and upper rate of 120 beats per minute with prolonged   A-V delay.     FINAL IMPRESSIONS:   1.   Successful implantation of a dual-chamber permanent pacemaker.     RECOMMENDATION(S):  The patient will be monitored on telemetry.      Elba Brown MD  09/22/23  16:11 CDT      Part of this note may be an electronic transcription/translation of spoken   language to printed text using the Dragon Dictation system.               ---------------------------------------------------  TTE/NOHEMI:  Results for orders placed during the hospital encounter of 09/21/23    Adult Transthoracic Echo Limited W/ Cont if Necessary Per Protocol    Interpretation Summary    Left ventricular systolic function is normal. Left ventricular ejection fraction appears to be 51 - 55%.    Left ventricular wall thickness is consistent with mild concentric hypertrophy.    Left ventricular diastolic function was indeterminate.    The left atrial cavity is mildly dilated.    Estimated right ventricular systolic pressure from tricuspid regurgitation is normal (<35 mmHg).    The aortic valve is abnormal in structure. There is moderate  calcification of the aortic valve. No significant aortic valve regurgitation is present. Mild aortic valve stenosis is present. Mild restriction to aortic valve opening.        Lab Results (last 24 hours)       Procedure Component Value Units Date/Time    POC Glucose Once [[id number]]  (Normal) Collected: 09/22/23 1157    Specimen: Blood Updated: 09/22/23 1256     Glucose 98 mg/dL      Comment: : [id number] [employer] ANGELAMeter ID: [id number]       TISSUE EXAM, [facility] ([facility]) [[id number]] Collected: 09/22/23 1728    Specimen: Tissue from Gastric, Antrum; Tissue from Esophagus Updated: 09/22/23 1957    POC Glucose Once [[id number]]  (Normal) Collected: 09/22/23 1807    Specimen: Blood Updated: 09/23/23 0403     Glucose 98 mg/dL      Comment: : [id number] [employer] ANGELAMeter ID: [id number]       POC Glucose Once [[id number]]  (Normal) Collected: 09/22/23 1911    Specimen: Blood Updated: 09/22/23 2016     Glucose 87 mg/dL      Comment: RN NotifiedOperator: [id number] HANCOCK JENNIFERMeter ID: [id number]       POC Glucose Once [[id number]]  (Normal) Collected: 09/23/23 0622    Specimen: Blood Updated: 09/23/23 0635     Glucose 88 mg/dL      Comment: RN NotifiedOperator: [id number] HANCOCK JENNIFERMeter ID: [id number]                 A/P    Third-degree heart block  Atrial fibrillation status post previous EP study and ablation    Patient presented with complete heart block and required transvenous pacing subsequently underwent a dual-chamber pacemaker implantation yesterday.  Pacemaker site is clean and intact without evidence of hematoma.  Encourage ambulation.  Xarelto can be resumed today for his atrial fibrillation with elevated [id number] score  patient should be discharged with outpatient follow-up with Dr. Brown.          Part of this note may be an electronic transcription/translation of spoken language to printed text using the Dragon Dictation System.

## 2023-09-23 NOTE — THERAPY EVALUATION
Acute Care - Occupational Therapy Initial Evaluation  [facility]     Patient Name: Jovan Hardwick  : 1950  MRN: [id number]  Today's Date: 2023  Onset of Illness/Injury or Date of Surgery: 23  Date of Referral to OT: 23  Referring Physician: JENNIFER Barajas MD    Admit Date: 2023       ICD-10-CM ICD-9-CM   1. Third degree heart block  I44.2 426.0   2. CHB (complete heart block)  I44.2 426.0   3. Coffee ground emesis  K92.0 578.0   4. Impaired mobility and activities of daily living [Z74.09, Z78.9 (ICD-10-CM)]  Z74.09 V49.89    Z78.9      Patient Active Problem List   Diagnosis    Type 2 diabetes mellitus with hypoglycemia without coma, with long-term current use of insulin    Mixed hyperlipidemia    Essential hypertension    Lupus    Class 2 obesity without serious comorbidity with body mass index (BMI) of 36.0 to 36.9 in adult    Smoking    Diabetic polyneuropathy associated with type 2 diabetes mellitus    Stage 3b chronic kidney disease    Third degree heart block    Macrocytic anemia    Elevated troponin    Hypertension    Coffee ground emesis     Past Medical History:   Diagnosis Date    Diabetic neuropathy     Elevated blood pressure     Hypercholesterolemia     Lupus erythematosus     Tobacco user     Type 2 diabetes mellitus with hyperglycemia     Type 2 diabetes mellitus, uncontrolled      Past Surgical History:   Procedure Laterality Date    CARDIAC CATHETERIZATION  2014    No epicardial coronary artery disease. Normal LV systolic function. EF 55%. No wall motion abnormalities. Systemic hypertension.         OT ASSESSMENT FLOWSHEET (last 12 hours)       OT Evaluation and Treatment       Row Name 23 1600                   OT Time and Intention    Subjective Information no complaints  -RB        Document Type evaluation  -RB        Mode of Treatment co-treatment;physical therapy;occupational therapy  -RB        Patient Effort good  -RB           General Information     Patient Profile Reviewed yes  -RB        Onset of Illness/Injury or Date of Surgery 09/21/23  -RB        Referring Physician JENNIFER Barajas MD  -RB        Prior Level of Function independent:;gait;transfer;ADL's;dependent:;driving  -RB        Equipment Currently Used at Home bipap/ cpap  -RB        Existing Precautions/Restrictions fall;oxygen therapy device and L/min;pacemaker   -RB        Equipment Issued to Patient gait belt  -RB        Risks Reviewed patient:;LOB  -RB           Living Environment    Current Living Arrangements apartment  -RB           Home Use of Assistive/Adaptive Equipment    Equipment Currently Used at Home cpap;grab bar  -RB           Pain Assessment    Pretreatment Pain Rating 0/10 - no pain  -RB        Posttreatment Pain Rating 0/10 - no pain  -RB           Cognition    Affect/Mental Status (Cognition) WFL  -RB        Orientation Status (Cognition) oriented x 4  -RB           Range of Motion (ROM)    Range of Motion ROM is WNL   R shld no flex above 90* and no weight bearing.  -RB           Strength Comprehensive (MMT)    General Manual Muscle Testing (MMT) Assessment other (see comments)  -RB        Comment, General Manual Muscle Testing (MMT) Assessment L UE strength was 4/5 and R UE strength was 3+/5 except shld flex not tested due to pacer precautions.   -RB           Activities of Daily Living    BADL Assessment/Intervention lower body dressing  -RB           Lower Body Dressing Assessment/Training    [place] Level (Lower Body Dressing) lower body dressing skills;doff;socks;moderate assist (50% patient effort)  -RB        Position (Lower Body Dressing) other (see comments)  sitting in recliner beside bed.  -RB           Bed Mobility    Bed Mobility supine-sit  -RB        Supine-Sit [place] (Bed Mobility) moderate assist (50% patient effort);2 person assist  -RB        Bed Mobility, Safety Issues decreased use of arms for pushing/pulling;impaired trunk control for bed mobility   -RB        Assistive Device (Bed Mobility) bed rails;head of bed elevated  -RB           Functional Mobility    Functional Mobility- Ind. Level minimum assist (75% patient effort)  -RB        Functional Mobility-Distance (Feet) 3  -RB        Functional Mobility- Safety Issues balance decreased during turns;sequencing ability decreased  -RB        Patient was able to Ambulate yes  -RB           Transfer Assessment/Treatment    Transfers sit-stand transfer;stand-sit transfer  -RB           Sit-Stand Transfer    Sit-Stand [place] (Transfers) minimum assist (75% patient effort)  -RB           Stand-Sit Transfer    Stand-Sit [place] (Transfers) minimum assist (75% patient effort)  -RB           Safety Issues, Functional Mobility    Safety Issues Affecting Function (Mobility) awareness of need for assistance  -RB           Wound 09/22/23 1600 Right anterior clavicle Incision    Wound - Properties Group Placement Date: 09/22/23  -AY Placement Time: 1600  -AY Side: Right  -AY Orientation: anterior  -AY Location: clavicle  -AY Primary Wound Type: Incision  -AY Additional Comments: pacemaker placement.  -AY    Retired Wound - Properties Group Placement Date: 09/22/23  -AY Placement Time: 1600  -AY Side: Right  -AY Orientation: anterior  -AY Location: clavicle  -AY Primary Wound Type: Incision  -AY Additional Comments: pacemaker placement.  -AY    Retired Wound - Properties Group Date first assessed: 09/22/23  -AY Time first assessed: 1600  -AY Side: Right  -AY Location: clavicle  -AY Primary Wound Type: Incision  -AY Additional Comments: pacemaker placement.  -AY       Plan of Care Review    Plan of Care Reviewed With patient  -RB           Vital Signs    Pre Systolic BP Rehab 133  -RB        Pre Treatment Diastolic BP 60  -RB        Post Systolic BP Rehab 165  -RB        Post Treatment Diastolic BP 74  -RB        Pretreatment Heart Rate (beats/min) 86  -RB        Pre SpO2 (%) 93  -RB        O2 Delivery Pre  Treatment supplemental O2  2 L  -RB        Intra SpO2 (%) 90  -RB        O2 Delivery Intra Treatment supplemental O2  -RB        Post SpO2 (%) 95  -RB        O2 Delivery Post Treatment supplemental O2  -RB        Pre Patient Position Supine  -RB        Intra Patient Position Standing  -RB        Post Patient Position Supine  -RB           Positioning and Restraints    Pre-Treatment Position in bed  -RB        Post Treatment Position chair  -RB        In Bed sitting;call light within reach;encouraged to call for assist;notified nsg  -RB           Therapy Assessment/Plan (OT)    Date of Referral to OT 09/23/23  -RB        Functional Level at Time of Evaluation (OT) Imp mob and ADLs.  -RB        OT Diagnosis Imp mob and ADLs.  -RB        Rehab Potential (OT) good, to achieve stated therapy goals  -RB        Criteria for Skilled Therapeutic Interventions Met (OT) yes;meets criteria  -RB        Therapy Frequency (OT) other (see comments)  5-7 days/wk  -RB        Predicted Duration of Therapy Intervention (OT) Until D/C or goals met.  -RB        Problem List (OT) problems related to;balance;coordination;mobility;range of motion (ROM);strength;inability to direct their own care  -RB        Activity Limitations Related to Problem List (OT) unable to ambulate safely;unable to transfer safely;BADLs not performed adequately or safely;IADLs not performed adequately or safely  -RB        Planned Therapy Interventions (OT) activity tolerance training;adaptive equipment training;BADL retraining;occupation/activity based interventions;functional balance retraining;patient/caregiver education/training;ROM/therapeutic exercise;strengthening exercise;transfer/mobility retraining  -RB           Evaluation Complexity (OT)    Review Occupational Profile/Medical/Therapy History Complexity expanded/moderate complexity  -RB        Assessment, Occupational Performance/Identification of Deficit Complexity 3-5 performance deficits  -RB         Clinical Decision Making Complexity (OT) detailed assessment/moderate complexity  -RB        Overall Complexity of Evaluation (OT) moderate complexity  -RB           Therapy Plan Review/Discharge Plan (OT)    Anticipated Discharge Disposition (OT) home with assist  -RB           OT Goals    Transfer Goal Selection (OT) transfer, OT goal 1  -RB        Bathing Goal Selection (OT) bathing, OT goal 1  -RB        Dressing Goal Selection (OT) dressing, OT goal 1  -RB        Toileting Goal Selection (OT) toileting, OT goal 1  -RB           Transfer Goal 1 (OT)    Activity/Assistive Device (Transfer Goal 1, OT) transfers, all  -RB        Barber Level/Cues Needed (Transfer Goal 1, OT) standby assist  -RB        Time Frame (Transfer Goal 1, OT) short term goal (STG);long term goal (LTG)  -RB        Progress/Outcome (Transfer Goal 1, OT) goal not met  -RB           Bathing Goal 1 (OT)    Activity/Device (Bathing Goal 1, OT) bathing skills, all  -RB        Barber Level/Cues Needed (Bathing Goal 1, OT) contact guard required  -RB        Time Frame (Bathing Goal 1, OT) long term goal (LTG)  -RB        Progress/Outcomes (Bathing Goal 1, OT) goal not met  -RB           Dressing Goal 1 (OT)    Activity/Device (Dressing Goal 1, OT) dressing skills, all  -RB        Barber/Cues Needed (Dressing Goal 1, OT) contact guard required  -RB        Time Frame (Dressing Goal 1, OT) long term goal (LTG)  -RB        Progress/Outcome (Dressing Goal 1, OT) goal not met  -RB           Toileting Goal 1 (OT)    Activity/Device (Toileting Goal 1, OT) toileting skills, all  -RB        Barber Level/Cues Needed (Toileting Goal 1, OT) supervision required  -RB        Time Frame (Toileting Goal 1, OT) long term goal (LTG)  -RB        Progress/Outcome (Toileting Goal 1, OT) goal not met  -RB                  User Key  (r) = Recorded By, (t) = Taken By, (c) = Cosigned By      Initials Name Effective Dates    RB Waldemar Woody, OT  07/11/23 -     Shahla Obregon, RN 02/27/23 -                      Occupational Therapy Education       Title: PT OT SLP Therapies (In Progress)       Topic: Occupational Therapy (In Progress)       Point: ADL training (Not Started)       Description:   Instruct learner(s) on proper safety adaptation and remediation techniques during self care or transfers.   Instruct in proper use of assistive devices.                  Learner Progress:  Not documented in this visit.              Point: Home exercise program (Not Started)       Description:   Instruct learner(s) on appropriate technique for monitoring, assisting and/or progressing therapeutic exercises/activities.                  Learner Progress:  Not documented in this visit.              Point: Precautions (Done)       Description:   Instruct learner(s) on prescribed precautions during self-care and functional transfers.                  Learning Progress Summary             Patient Acceptance, E, VU,NR by  at 9/23/2023 2784    Comment: Edu pt on pacer precautions and no OOB without assist.  Also use of gait belt and non skid socks when OOB.                         Point: Body mechanics (Not Started)       Description:   Instruct learner(s) on proper positioning and spine alignment during self-care, functional mobility activities and/or exercises.                  Learner Progress:  Not documented in this visit.                              User Key       Initials Effective Dates Name Provider Type Discipline     07/11/23 -  Waldemar Woody OT Occupational Therapist OT                      OT Recommendation and Plan  Planned Therapy Interventions (OT): activity tolerance training, adaptive equipment training, BADL retraining, occupation/activity based interventions, functional balance retraining, patient/caregiver education/training, ROM/therapeutic exercise, strengthening exercise, transfer/mobility retraining  Therapy Frequency (OT): other (see comments)  (5-7 days/wk)  Plan of Care Review  Plan of Care Reviewed With: patient  Plan of Care Reviewed With: patient     Outcome Measures       Row Name 09/23/23 1600             How much help from another is currently needed...    Putting on and taking off regular lower body clothing? 2  -RB      Bathing (including washing, rinsing, and drying) 2  -RB      Toileting (which includes using toilet bed pan or urinal) 2  -RB      Putting on and taking off regular upper body clothing 2  -RB      Taking care of personal grooming (such as brushing teeth) 3  -RB      Eating meals 4  -RB      AM-PAC 6 Clicks Score (OT) 15  -RB         Functional Assessment    Outcome Measure Options AM-PAC 6 Clicks Daily Activity (OT)  -RB                User Key  (r) = Recorded By, (t) = Taken By, (c) = Cosigned By      Initials Name Provider Type    Waldemar Lacey OT Occupational Therapist                    Time Calculation:    Time Calculation- OT       Row Name 09/23/23 1717             Time Calculation- OT    OT Start Time 1600  -RB      OT Stop Time 1640  -RB      OT Time Calculation (min) 40 min  -RB      OT Received On 09/23/23  -RB      OT Goal Re-Cert Due Date 10/06/23  -RB                User Key  (r) = Recorded By, (t) = Taken By, (c) = Cosigned By      Initials Name Provider Type    Waldemar Lacey OT Occupational Therapist                  Therapy Charges for Today       Code Description Service Date Service Provider Modifiers Qty    [id number]  OT EVAL MOD COMPLEXITY 3 9/23/2023 Waldemar Woody OT GO 1                 Waldemar Woody OT  9/23/2023

## 2023-09-23 NOTE — PROGRESS NOTES
[facility] Medicine Services  INPATIENT PROGRESS NOTE    Length of Stay: 2  Date of Admission: 9/21/2023  Primary Care Physician: Maisha Toure MD    Subjective   (S) Admitted for symptomatic bradycardia in the setting of previous a fib with ablation; a transvenous pacemaker was placed by cardiologist    The patient reports that he is feeling some better. He is ready for PT to work him today. No reported N/V.    Review of Systems   Constitutional:  Positive for activity change.        Prior to Admission medications    Medication Sig Start Date End Date Taking? Authorizing Provider   bumetanide (BUMEX) 2 MG tablet Take 1 tablet by mouth Daily. Pt takes 1-3 tablets daily 4/24/17  Yes Brittany Rios MD   FLUoxetine (PROzac) 10 MG capsule Take 1 capsule by mouth Daily.   Yes Brittany Rios MD   HumaLOG 100 UNIT/ML injection INJECT 150 UNITS DAILY VIA INSULIN PUMP (NEED TO BE SEEN) 5/14/21  Yes Casa Hall APRN   HYDROcodone-acetaminophen (NORCO)  MG per tablet Take 1 tablet by mouth Every 8 (Eight) Hours As Needed. 11/10/16  Yes Brittany Rios MD   hydroxychloroquine (PLAQUENIL) 200 MG tablet Take 1 tablet by mouth Daily.   Yes Brittany Rios MD   losartan (COZAAR) 25 MG tablet Take 4 tablets by mouth Daily. 3/6/18  Yes Brittany Rios MD   metoprolol tartrate (LOPRESSOR) 25 MG tablet Take 1 tablet by mouth Daily. 9/16/17  Yes Brittany Rios MD   potassium chloride (K-DUR) 10 MEQ CR tablet Take 2 tablets by mouth Daily. 12/28/16  Yes Brittany Rios MD   predniSONE (DELTASONE) 10 MG tablet Take 0.5 tablets by mouth Daily. 3/15/17  Yes Brittany Rios MD   rOPINIRole (REQUIP) 1 MG tablet Take 1 tablet by mouth Every Night. Take 1 hour before bedtime.   Yes Brittany Rios MD   tamsulosin (FLOMAX) 0.4 MG capsule 24 hr capsule Take 2 capsules by mouth Every Night.   Yes Brittany Rios MD    vitamin D (ERGOCALCIFEROL) 1.25 MG (19625 UT) capsule capsule Take 1 capsule by mouth Every 7 (Seven) Days. 9/9/20  Yes Casa Hall APRN   XARELTO 15 MG tablet Take 1 tablet by mouth Daily. 5/17/17  Yes Brittany Rios MD   ZETIA 10 MG tablet Take 1 tablet by mouth Daily. 12/28/16  Yes Brittany Rios MD   zolpidem (AMBIEN) 10 MG tablet Take 1 tablet by mouth At Night As Needed. 12/20/16  Yes Brittany Rios MD   amiodarone (PACERONE) 200 MG tablet Take 1 tablet by mouth 2 (Two) Times a Day. 5/17/17   Brittany Rios MD   cetirizine (ZyrTEC) 10 MG tablet Take 1 tablet by mouth Daily.    Brittany Rios MD   clindamycin (CLEOCIN) 300 MG capsule Take 1 capsule by mouth 2 (Two) Times a Day.  Patient not taking: Reported on 9/7/2023 6/2/17   Brittany Rios MD   dapagliflozin Propanediol 10 MG tablet Take  by mouth.    Brittany Rios MD   Glucagon (Baqsimi One Pack) 3 MG/DOSE powder 1 each into the nostril(s) as directed by provider As Needed (Hypoglycemia). Apply intranasal if hypoglycemia 9/7/23   Casa Hall APRN   glucose blood (Accu-Chek Guide) test strip Test 6 times daily. DX CODE: E11.65 6/7/23   Casa Hall APRN   glucose monitor monitoring kit INES CONTOUR NEXT METER.  USE TO TEST 4 TIMES A DAY.  DX-E11.8  Patient not taking: Reported on 9/7/2023 7/24/19   Casa Hall APRN   Lancets misc Test 4 times daily , E11.649 5/6/22   Casa Hall APRN   Multiple Vitamins-Minerals (DAILY MULTIVITAMIN PO) Take 1 capsule by mouth daily.    Brittany Rios MD   nystatin (MYCOSTATIN) 913114 UNIT/GM powder Apply  topically to the appropriate area as directed 4 (Four) Times a Day.    Brittany Rios MD   Omega-3 Fatty Acids (FISH OIL) 1000 MG capsule capsule Take 1 capsule by mouth 2 (Two) Times a Day.    Brittany Rios MD   penicillin v potassium (VEETID) 500 MG tablet Take 1 tablet by mouth 4 (Four)  Times a Day.    ProviderBrittany MD   sulfamethoxazole-trimethoprim (BACTRIM DS,SEPTRA DS) 800-160 MG per tablet Take 1 tablet by mouth 2 (Two) Times a Day.    ProviderBrittany MD       amLODIPine, 5 mg, Oral, Q24H  bumetanide, 2 mg, Oral, Daily  Canagliflozin, 300 mg, Oral, Daily  cholecalciferol, 1,000 Units, Oral, Daily  FLUoxetine, 10 mg, Oral, Daily  gentamicin, 80 mg, Intravenous, Once  haloperidol lactate, 3 mg, Intramuscular, Once  hydroxychloroquine, 200 mg, Oral, Daily  Insulin Aspart, 0-14 Units, Subcutaneous, TID AC  losartan, 100 mg, Oral, Daily  predniSONE, 5 mg, Oral, Daily  rOPINIRole, 1 mg, Oral, Nightly  sodium chloride, 10 mL, Intravenous, Q12H  sodium chloride, 10 mL, Intravenous, Q12H  tamsulosin, 0.8 mg, Oral, Nightly      dextrose 5 % and sodium chloride 0.45 %, 30 mL/hr  pantoprazole, 8 mg/hr, Last Rate: 8 mg/hr (09/23/23 0508)        Objective    Temp:  [97 °F (36.1 °C)-98.8 °F (37.1 °C)] 98.7 °F (37.1 °C)  Heart Rate:  [60-98] 98  Resp:  [16] 16  BP: (119-215)/(58-96) 141/72    Physical Exam  Vitals reviewed.   Constitutional:       Appearance: Normal appearance.   HENT:      Head: Normocephalic and atraumatic.      Right Ear: External ear normal.      Left Ear: External ear normal.      Mouth/Throat:      Mouth: Mucous membranes are moist.   Eyes:      Extraocular Movements: Extraocular movements intact.   Cardiovascular:      Heart sounds: Normal heart sounds.      Comments: Ventricular pacing   Pulmonary:      Breath sounds: Normal breath sounds.   Abdominal:      General: There is no distension.      Palpations: Abdomen is soft.      Tenderness: There is no abdominal tenderness.   Musculoskeletal:         General: Normal range of motion.      Cervical back: Neck supple.   Skin:     General: Skin is warm.   Neurological:      General: No focal deficit present.      Mental Status: He is alert and oriented to person, place, and time.   Psychiatric:         Mood and Affect: Mood  normal.         Behavior: Behavior normal.       Results Review:  I have reviewed the labs, radiology results, and diagnostic studies.    Laboratory Data:   Results from last 7 days   Lab Units 09/21/23  0643 09/21/23  0325   SODIUM mmol/L 139 141   POTASSIUM mmol/L 4.6 4.3   CHLORIDE mmol/L 102 105   CO2 mmol/L 24.0 27.0   BUN mg/dL 35* 34*   CREATININE mg/dL 2.02* 1.92*   GLUCOSE mg/dL 191* 94   CALCIUM mg/dL 9.4 8.8   BILIRUBIN mg/dL  --  0.2   ALK PHOS U/L  --  50   ALT (SGPT) U/L  --  12   AST (SGOT) U/L  --  15   ANION GAP mmol/L 13.0 9.0       Estimated Creatinine Clearance: 42.6 mL/min (A) (by C-G formula based on SCr of 2.02 mg/dL (H)).  Results from last 7 days   Lab Units 09/21/23  0325   MAGNESIUM mg/dL 2.1           Results from last 7 days   Lab Units 09/21/23 1956 09/21/23  0325   WBC 10*3/mm3 11.88* 8.42   HEMOGLOBIN g/dL 11.9* 11.1*   HEMATOCRIT % 33.9* 34.6*   PLATELETS 10*3/mm3 115* 124*       Results from last 7 days   Lab Units 09/21/23 1956   INR  1.16         Culture Data:   No results found for: BLOODCX  No results found for: URINECX  No results found for: RESPCX  No results found for: WOUNDCX  No results found for: STOOLCX  No components found for: BODYFLD    Radiology Data:   Imaging Results (Last 24 Hours)       Procedure Component Value Units Date/Time    XR Chest 1 View [[id number]] Collected: 09/22/23 1807     Updated: 09/22/23 2048    Narrative:      INDICATION:  post PPM placement.    COMPARISON:  None relevant.    FINDINGS:  Cardiac size is not enlarged.  No pleural effusion or pneumothorax.  No dense  airspace consolidation. ICD is present.      Impression:      Negative for pneumothorax.      XR Abdomen KUB [[id number]] Collected: 09/22/23 1351     Updated: 09/22/23 1355    Narrative:      Single view of the KUB area, compared to the study from one day before, shows  resolution of gastric distention after placement of the orogastric tube.    No distended loops of large and small  bowel are seen.  No large amount of free  air is seen.  No abnormal soft tissue mass is seen.    Stable calcifications are seen in the pelvis.            I have reviewed the patient's current medications.     Assessment/Plan     Third degree heart block     S/p PPM placement per EP.     Appreciated cardiologist assistance    Ileus      Due to above     Resolving; if stable by tomorrow, will discontinue NGT    Coffee ground emesis     GI following. S/p EGD.     Protonix gtt    Altered mental status     Likely due to above     Resolved     Chronic medical problems     Essential hypertension     CKD stage III: it looks at his baseline      Medical Decision Making  Number and Complexity of problems: two major complexes  Differential Diagnosis: SBO    Conditions and Status:        Condition is improving.     MDM Data  External documents reviewed: previous medical records  My EKG interpretation: third degree block   My plain film interpretation: n/a  Tests considered but not ordered: none     Discussed with: RN and patient's grandson      Treatment Plan  See above    Care Planning  Shared decision making: his daughter   Code status and discussions: full code  Code Status and Medical Interventions:   Ordered at: 09/21/23 0645     Level Of Support Discussed With:    Patient     Code Status (Patient has no pulse and is not breathing):    CPR (Attempt to Resuscitate)     Medical Interventions (Patient has pulse or is breathing):    Full Support      Disposition  Social Determinants of Health that impact treatment or disposition: none  I expect the patient to be discharged: in progress    I confirmed that the patient's Advance Care Plan is present, code status is documented, or surrogate decision maker is listed in the patient's medical record.     I confirmed that the patient's Advance Care Plan is present, code status is documented, or surrogate decision maker is listed in the patient's medical record.           This  document has been electronically signed by Zev Hector MD on September 23, 2023 12:52 CDT

## 2023-09-23 NOTE — PLAN OF CARE
Problem: Adult Inpatient Plan of Care  Goal: Plan of Care Review  Outcome: Ongoing, Progressing  Flowsheets (Taken 9/23/2023 0643)  Progress: improving  Plan of Care Reviewed With: patient  Goal: Patient-Specific Goal (Individualized)  Outcome: Ongoing, Progressing  Goal: Absence of Hospital-Acquired Illness or Injury  Outcome: Ongoing, Progressing  Intervention: Identify and Manage Fall Risk  Recent Flowsheet Documentation  Taken 9/22/2023 2100 by Kristen Braun RN  Safety Promotion/Fall Prevention: safety round/check completed  Taken 9/22/2023 1955 by Kristen Braun RN  Safety Promotion/Fall Prevention: safety round/check completed  Intervention: Prevent Skin Injury  Recent Flowsheet Documentation  Taken 9/22/2023 2100 by Kristen Braun RN  Body Position: patient/family refused  Taken 9/22/2023 1955 by Kristen Braun RN  Body Position: patient/family refused  Intervention: Prevent and Manage VTE (Venous Thromboembolism) Risk  Recent Flowsheet Documentation  Taken 9/22/2023 2100 by Kristen Braun RN  Activity Management: bedrest  Taken 9/22/2023 1955 by Kristen Braun RN  Activity Management: bedrest  VTE Prevention/Management: (see orders) other (see comments)  Goal: Optimal Comfort and Wellbeing  Outcome: Ongoing, Progressing  Intervention: Monitor Pain and Promote Comfort  Recent Flowsheet Documentation  Taken 9/22/2023 2344 by Kristen Braun RN  Pain Management Interventions: see MAR  Intervention: Provide Person-Centered Care  Recent Flowsheet Documentation  Taken 9/22/2023 1955 by Kristen Braun RN  Trust Relationship/Rapport: care explained  Goal: Readiness for Transition of Care  Outcome: Ongoing, Progressing   Goal Outcome Evaluation:  Plan of Care Reviewed With: patient        Progress: improving

## 2023-09-24 LAB
ALBUMIN SERPL-MCNC: 2.4 G/DL (ref 3.5–5.2)
ALBUMIN/GLOB SERPL: 0.9 G/DL
ALP SERPL-CCNC: 48 U/L (ref 39–117)
ALT SERPL W P-5'-P-CCNC: 7 U/L (ref 1–41)
ANION GAP SERPL CALCULATED.3IONS-SCNC: 9 MMOL/L (ref 5–15)
AST SERPL-CCNC: 20 U/L (ref 1–40)
BASOPHILS # BLD AUTO: 0.02 10*3/MM3 (ref 0–0.2)
BASOPHILS NFR BLD AUTO: 0.3 % (ref 0–1.5)
BILIRUB SERPL-MCNC: 0.3 MG/DL (ref 0–1.2)
BUN SERPL-MCNC: 39 MG/DL (ref 8–23)
BUN/CREAT SERPL: 17 (ref 7–25)
CALCIUM SPEC-SCNC: 8.1 MG/DL (ref 8.6–10.5)
CHLORIDE SERPL-SCNC: 103 MMOL/L (ref 98–107)
CO2 SERPL-SCNC: 28 MMOL/L (ref 22–29)
CREAT SERPL-MCNC: 2.3 MG/DL (ref 0.76–1.27)
DEPRECATED RDW RBC AUTO: 53.4 FL (ref 37–54)
EGFRCR SERPLBLD CKD-EPI 2021: 29.4 ML/MIN/1.73
EOSINOPHIL # BLD AUTO: 0.05 10*3/MM3 (ref 0–0.4)
EOSINOPHIL NFR BLD AUTO: 0.7 % (ref 0.3–6.2)
ERYTHROCYTE [DISTWIDTH] IN BLOOD BY AUTOMATED COUNT: 13.6 % (ref 12.3–15.4)
GLOBULIN UR ELPH-MCNC: 2.6 GM/DL
GLUCOSE BLDC GLUCOMTR-MCNC: 107 MG/DL (ref 70–130)
GLUCOSE BLDC GLUCOMTR-MCNC: 130 MG/DL (ref 70–130)
GLUCOSE BLDC GLUCOMTR-MCNC: 133 MG/DL (ref 70–130)
GLUCOSE BLDC GLUCOMTR-MCNC: 144 MG/DL (ref 70–130)
GLUCOSE SERPL-MCNC: 113 MG/DL (ref 65–99)
HCT VFR BLD AUTO: 32.4 % (ref 37.5–51)
HGB BLD-MCNC: 10.2 G/DL (ref 13–17.7)
IMM GRANULOCYTES # BLD AUTO: 0.06 10*3/MM3 (ref 0–0.05)
IMM GRANULOCYTES NFR BLD AUTO: 0.8 % (ref 0–0.5)
LYMPHOCYTES # BLD AUTO: 0.61 10*3/MM3 (ref 0.7–3.1)
LYMPHOCYTES NFR BLD AUTO: 8.6 % (ref 19.6–45.3)
MCH RBC QN AUTO: 33.6 PG (ref 26.6–33)
MCHC RBC AUTO-ENTMCNC: 31.5 G/DL (ref 31.5–35.7)
MCV RBC AUTO: 106.6 FL (ref 79–97)
MONOCYTES # BLD AUTO: 0.99 10*3/MM3 (ref 0.1–0.9)
MONOCYTES NFR BLD AUTO: 14 % (ref 5–12)
NEUTROPHILS NFR BLD AUTO: 5.35 10*3/MM3 (ref 1.7–7)
NEUTROPHILS NFR BLD AUTO: 75.6 % (ref 42.7–76)
NRBC BLD AUTO-RTO: 0 /100 WBC (ref 0–0.2)
PLATELET # BLD AUTO: 106 10*3/MM3 (ref 140–450)
PMV BLD AUTO: 9.3 FL (ref 6–12)
POTASSIUM SERPL-SCNC: 4.3 MMOL/L (ref 3.5–5.2)
PROT SERPL-MCNC: 5 G/DL (ref 6–8.5)
RBC # BLD AUTO: 3.04 10*6/MM3 (ref 4.14–5.8)
SODIUM SERPL-SCNC: 140 MMOL/L (ref 136–145)
WBC NRBC COR # BLD: 7.08 10*3/MM3 (ref 3.4–10.8)

## 2023-09-24 PROCEDURE — 63710000001 PREDNISONE PER 5 MG: Performed by: INTERNAL MEDICINE

## 2023-09-24 PROCEDURE — 85025 COMPLETE CBC W/AUTO DIFF WBC: CPT | Performed by: FAMILY MEDICINE

## 2023-09-24 PROCEDURE — 82948 REAGENT STRIP/BLOOD GLUCOSE: CPT

## 2023-09-24 PROCEDURE — 80053 COMPREHEN METABOLIC PANEL: CPT | Performed by: FAMILY MEDICINE

## 2023-09-24 RX ADMIN — BUMETANIDE 2 MG: 1 TABLET ORAL at 09:47

## 2023-09-24 RX ADMIN — METOPROLOL TARTRATE 25 MG: 25 TABLET, FILM COATED ORAL at 13:57

## 2023-09-24 RX ADMIN — METOPROLOL TARTRATE 25 MG: 25 TABLET, FILM COATED ORAL at 21:06

## 2023-09-24 RX ADMIN — SODIUM CHLORIDE 8 MG/HR: 900 INJECTION INTRAVENOUS at 01:49

## 2023-09-24 RX ADMIN — TAMSULOSIN HYDROCHLORIDE 0.8 MG: 0.4 CAPSULE ORAL at 20:10

## 2023-09-24 RX ADMIN — Medication 5 MG: at 20:10

## 2023-09-24 RX ADMIN — SODIUM CHLORIDE 8 MG/HR: 900 INJECTION INTRAVENOUS at 15:43

## 2023-09-24 RX ADMIN — HYDROXYCHLOROQUINE SULFATE 200 MG: 200 TABLET, FILM COATED ORAL at 09:47

## 2023-09-24 RX ADMIN — FLUOXETINE 10 MG: 10 CAPSULE ORAL at 09:48

## 2023-09-24 RX ADMIN — AMLODIPINE BESYLATE 5 MG: 5 TABLET ORAL at 09:47

## 2023-09-24 RX ADMIN — CANAGLIFLOZIN 300 MG: 300 TABLET, FILM COATED ORAL at 09:47

## 2023-09-24 RX ADMIN — HYDROCODONE BITARTRATE AND ACETAMINOPHEN 1 TABLET: 5; 325 TABLET ORAL at 13:57

## 2023-09-24 RX ADMIN — PREDNISONE 5 MG: 5 TABLET ORAL at 09:48

## 2023-09-24 RX ADMIN — HYDROCODONE BITARTRATE AND ACETAMINOPHEN 1 TABLET: 5; 325 TABLET ORAL at 05:06

## 2023-09-24 RX ADMIN — HYDROCODONE BITARTRATE AND ACETAMINOPHEN 1 TABLET: 5; 325 TABLET ORAL at 21:06

## 2023-09-24 RX ADMIN — SODIUM CHLORIDE 8 MG/HR: 900 INJECTION INTRAVENOUS at 20:10

## 2023-09-24 RX ADMIN — ROPINIROLE 1 MG: 1 TABLET, FILM COATED ORAL at 20:10

## 2023-09-24 RX ADMIN — Medication 1000 UNITS: at 09:48

## 2023-09-24 RX ADMIN — LOSARTAN POTASSIUM 100 MG: 50 TABLET, FILM COATED ORAL at 09:47

## 2023-09-24 RX ADMIN — SODIUM CHLORIDE 8 MG/HR: 900 INJECTION INTRAVENOUS at 11:01

## 2023-09-24 RX ADMIN — SODIUM CHLORIDE 8 MG/HR: 900 INJECTION INTRAVENOUS at 05:59

## 2023-09-24 NOTE — PROGRESS NOTES
[facility]  INPATIENT PROGRESS NOTE    Length of Stay: 3  Date of Admission: 9/21/2023  Primary Care Physician: Maisha Toure MD    Subjective   (S) Admitted for symptomatic bradycardia in the setting of previous a fib with ablation; a transvenous pacemaker was placed by cardiologist    The patient reports that he is again feeling some better today. He is more alert and active today.    Review of Systems   Constitutional:  Positive for activity change.        Prior to Admission medications    Medication Sig Start Date End Date Taking? Authorizing Provider   bumetanide (BUMEX) 2 MG tablet Take 1 tablet by mouth Daily. Pt takes 1-3 tablets daily 4/24/17  Yes Brittany Rios MD   FLUoxetine (PROzac) 10 MG capsule Take 1 capsule by mouth Daily.   Yes Brittany Rios MD   HumaLOG 100 UNIT/ML injection INJECT 150 UNITS DAILY VIA INSULIN PUMP (NEED TO BE SEEN) 5/14/21  Yes Casa Hall APRN   HYDROcodone-acetaminophen (NORCO)  MG per tablet Take 1 tablet by mouth Every 8 (Eight) Hours As Needed. 11/10/16  Yes Brittany Rios MD   hydroxychloroquine (PLAQUENIL) 200 MG tablet Take 1 tablet by mouth Daily.   Yes Brittany Rios MD   losartan (COZAAR) 25 MG tablet Take 4 tablets by mouth Daily. 3/6/18  Yes Brittany Rios MD   metoprolol tartrate (LOPRESSOR) 25 MG tablet Take 1 tablet by mouth Daily. 9/16/17  Yes Brittany Rios MD   potassium chloride (K-DUR) 10 MEQ CR tablet Take 2 tablets by mouth Daily. 12/28/16  Yes Brittany Rios MD   predniSONE (DELTASONE) 10 MG tablet Take 0.5 tablets by mouth Daily. 3/15/17  Yes Brittany Rios MD   rOPINIRole (REQUIP) 1 MG tablet Take 1 tablet by mouth Every Night. Take 1 hour before bedtime.   Yes Brittany Rios MD   tamsulosin (FLOMAX) 0.4 MG capsule 24 hr capsule Take 2 capsules by mouth Every Night.   Yes Brittany Rios MD   vitamin  D (ERGOCALCIFEROL) 1.25 MG (97448 UT) capsule capsule Take 1 capsule by mouth Every 7 (Seven) Days. 9/9/20  Yes Casa Hall APRN   XARELTO 15 MG tablet Take 1 tablet by mouth Daily. 5/17/17  Yes Brittany Rios MD   ZETIA 10 MG tablet Take 1 tablet by mouth Daily. 12/28/16  Yes Brittany Rios MD   zolpidem (AMBIEN) 10 MG tablet Take 1 tablet by mouth At Night As Needed. 12/20/16  Yes Brittany Rios MD   amiodarone (PACERONE) 200 MG tablet Take 1 tablet by mouth 2 (Two) Times a Day. 5/17/17   Brittany Rios MD   cetirizine (ZyrTEC) 10 MG tablet Take 1 tablet by mouth Daily.    Brittany Rios MD   clindamycin (CLEOCIN) 300 MG capsule Take 1 capsule by mouth 2 (Two) Times a Day.  Patient not taking: Reported on 9/7/2023 6/2/17   Brittany Rios MD   dapagliflozin Propanediol 10 MG tablet Take  by mouth.    Brittany Rios MD   Glucagon (Baqsimi One Pack) 3 MG/DOSE powder 1 each into the nostril(s) as directed by provider As Needed (Hypoglycemia). Apply intranasal if hypoglycemia 9/7/23   Casa Hall APRN   glucose blood (Accu-Chek Guide) test strip Test 6 times daily. DX CODE: E11.65 6/7/23   Casa Hall APRN   glucose monitor monitoring kit INES CONTOUR NEXT METER.  USE TO TEST 4 TIMES A DAY.  DX-E11.8  Patient not taking: Reported on 9/7/2023 7/24/19   Casa Hall APRN   Lancets misc Test 4 times daily , E11.649 5/6/22   Casa Hall APRN   Multiple Vitamins-Minerals (DAILY MULTIVITAMIN PO) Take 1 capsule by mouth daily.    Brittany Rios MD   nystatin (MYCOSTATIN) 113882 UNIT/GM powder Apply  topically to the appropriate area as directed 4 (Four) Times a Day.    Brittany Rios MD   Omega-3 Fatty Acids (FISH OIL) 1000 MG capsule capsule Take 1 capsule by mouth 2 (Two) Times a Day.    Brittany Rios MD   penicillin v potassium (VEETID) 500 MG tablet Take 1 tablet by mouth 4 (Four) Times a Day.     Provider, MD Brittany   sulfamethoxazole-trimethoprim (BACTRIM DS,SEPTRA DS) 800-160 MG per tablet Take 1 tablet by mouth 2 (Two) Times a Day.    ProviderBrittany MD       amLODIPine, 5 mg, Oral, Q24H  bumetanide, 2 mg, Oral, Daily  Canagliflozin, 300 mg, Oral, Daily  cholecalciferol, 1,000 Units, Oral, Daily  FLUoxetine, 10 mg, Oral, Daily  gentamicin, 80 mg, Intravenous, Once  haloperidol lactate, 3 mg, Intramuscular, Once  hydroxychloroquine, 200 mg, Oral, Daily  Insulin Aspart, 0-14 Units, Subcutaneous, TID AC  losartan, 100 mg, Oral, Daily  predniSONE, 5 mg, Oral, Daily  rOPINIRole, 1 mg, Oral, Nightly  sodium chloride, 10 mL, Intravenous, Q12H  sodium chloride, 10 mL, Intravenous, Q12H  tamsulosin, 0.8 mg, Oral, Nightly      dextrose 5 % and sodium chloride 0.45 %, 30 mL/hr  pantoprazole, 8 mg/hr, Last Rate: 8 mg/hr (09/24/23 1101)        Objective    Temp:  [97 °F (36.1 °C)-99.2 °F (37.3 °C)] 99.2 °F (37.3 °C)  Heart Rate:  [] 93  Resp:  [16-18] 18  BP: ()/(52-73) 134/66    Physical Exam  Vitals reviewed.   Constitutional:       Appearance: Normal appearance.   HENT:      Head: Normocephalic and atraumatic.      Right Ear: External ear normal.      Left Ear: External ear normal.      Mouth/Throat:      Mouth: Mucous membranes are moist.   Eyes:      Extraocular Movements: Extraocular movements intact.   Cardiovascular:      Rate and Rhythm: Normal rate.   Pulmonary:      Breath sounds: Normal breath sounds.   Abdominal:      General: There is no distension.      Palpations: Abdomen is soft.      Tenderness: There is no abdominal tenderness.   Musculoskeletal:      Cervical back: Neck supple.      Comments: Sling in place. RUE immobilized.   Skin:     General: Skin is warm.   Neurological:      General: No focal deficit present.      Mental Status: He is alert and oriented to person, place, and time.   Psychiatric:         Mood and Affect: Mood normal.         Behavior: Behavior normal.        Results Review:  I have reviewed the labs, radiology results, and diagnostic studies.    Laboratory Data:   Results from last 7 days   Lab Units 09/23/23  1303 09/21/23  0643 09/21/23  0325   SODIUM mmol/L 141 139 141   POTASSIUM mmol/L 4.1 4.6 4.3   CHLORIDE mmol/L 102 102 105   CO2 mmol/L 30.0* 24.0 27.0   BUN mg/dL 30* 35* 34*   CREATININE mg/dL 2.01* 2.02* 1.92*   GLUCOSE mg/dL 144* 191* 94   CALCIUM mg/dL 8.4* 9.4 8.8   BILIRUBIN mg/dL 0.4  --  0.2   ALK PHOS U/L 48  --  50   ALT (SGPT) U/L 8  --  12   AST (SGOT) U/L 21  --  15   ANION GAP mmol/L 9.0 13.0 9.0       Estimated Creatinine Clearance: 43 mL/min (A) (by C-G formula based on SCr of 2.01 mg/dL (H)).  Results from last 7 days   Lab Units 09/21/23  0325   MAGNESIUM mg/dL 2.1           Results from last 7 days   Lab Units 09/23/23  1303 09/21/23 1956 09/21/23  0325   WBC 10*3/mm3 9.64 11.88* 8.42   HEMOGLOBIN g/dL 10.8* 11.9* 11.1*   HEMATOCRIT % 32.9* 33.9* 34.6*   PLATELETS 10*3/mm3 108* 115* 124*       Results from last 7 days   Lab Units 09/21/23 1956   INR  1.16         Culture Data:   No results found for: BLOODCX  No results found for: URINECX  No results found for: RESPCX  No results found for: WOUNDCX  No results found for: STOOLCX  No components found for: BODYFLD    Radiology Data:   Imaging Results (Last 24 Hours)       ** No results found for the last 24 hours. **            I have reviewed the patient's current medications.     Assessment/Plan     Third degree heart block     S/p PPM placement per EP.     Appreciated cardiologist assistance    Ileus      Resolved.    Coffee ground emesis     GI following. S/p EGD.     Protonix gtt    Altered mental status     Likely due to above     Resolved     Chronic medical problems     Essential hypertension     CKD stage III: it looks at his baseline    Afib     Xarelto restarted, first dose to be tomorrow morning at 9:00. CBC pending. GI following for initial coffee ground emesis.     Medical  Decision Making  Number and Complexity of problems: two major complexes  Differential Diagnosis: SBO    Conditions and Status:        Condition is improving.     MDM Data  External documents reviewed: previous medical records  My EKG interpretation: third degree block   My plain film interpretation: n/a  Tests considered but not ordered: none     Discussed with: RN and patient's grandson      Treatment Plan  See above    Care Planning  Shared decision making: his daughter   Code status and discussions: full code  Code Status and Medical Interventions:   Ordered at: 09/21/23 0645     Level Of Support Discussed With:    Patient     Code Status (Patient has no pulse and is not breathing):    CPR (Attempt to Resuscitate)     Medical Interventions (Patient has pulse or is breathing):    Full Support      Disposition  Social Determinants of Health that impact treatment or disposition: none  I expect the patient to be discharged: in progress    I confirmed that the patient's Advance Care Plan is present, code status is documented, or surrogate decision maker is listed in the patient's medical record.     I confirmed that the patient's Advance Care Plan is present, code status is documented, or surrogate decision maker is listed in the patient's medical record.           This document has been electronically signed by Zev Hector MD on September 24, 2023 11:51 CDT

## 2023-09-24 NOTE — PROGRESS NOTES
[facility] Cardiology  INPATIENT PROGRESS NOTE    Name: Jovan Hardwick  Age/Sex: 72 y.o. male  :  1950        PCP: Maisha Toure MD    Vital Signs  Temp:  [97 °F (36.1 °C)-99.2 °F (37.3 °C)] 99.2 °F (37.3 °C)  Heart Rate:  [] 93  Resp:  [16-18] 18  BP: ()/(52-73) 134/66  Body mass index is 31.68 kg/m².      Subjective   Patient denies any chest pain shortness of breath.  He reports mild soreness around the pacemaker site but otherwise has been ambulating without issues.      Third degree heart block    Stage 3b chronic kidney disease    Macrocytic anemia    Elevated troponin    Hypertension    Coffee ground emesis      Past Medical History:   Diagnosis Date    Diabetic neuropathy     Elevated blood pressure     Hypercholesterolemia     Lupus erythematosus     Tobacco user     Type 2 diabetes mellitus with hyperglycemia     Type 2 diabetes mellitus, uncontrolled        Current Facility-Administered Medications   Medication Dose Route Frequency Provider Last Rate Last Admin    acetaminophen (TYLENOL) tablet 650 mg  650 mg Oral Q4H PRN Elba Brown MD        Or    acetaminophen (TYLENOL) 160 MG/5ML oral solution 650 mg  650 mg Oral Q4H PRN Elba Brown MD        Or    acetaminophen (TYLENOL) suppository 650 mg  650 mg Rectal Q4H PRN Elba Brown MD        amLODIPine (NORVASC) tablet 5 mg  5 mg Oral Q24H Elba Brown MD   5 mg at 23 0947    bumetanide (BUMEX) tablet 2 mg  2 mg Oral Daily Elba Brown MD   2 mg at 23 0947    Canagliflozin (INVOKANA) 300 MG tablet tablet 300 mg  300 mg Oral Daily Elba Brown MD   300 mg at 23 0947    cholecalciferol (VITAMIN D3) tablet 1,000 Units  1,000 Units Oral Daily Elba Brown MD   1,000 Units at 23 0948    dextrose (D50W) (25 g/50 mL) IV injection 25 g  25 g Intravenous Q15 Min PRN Elba Brown MD        dextrose (GLUTOSE) oral gel 15 g  15 g Oral Q15 Min PRN Manoj Brownd, MD         dextrose 5 % and sodium chloride 0.45 % infusion  30 mL/hr Intravenous Continuous PRN Emery Healy MD        FLUoxetine (PROzac) capsule 10 mg  10 mg Oral Daily Elba Brown MD   10 mg at 09/24/23 0948    gentamicin (GARAMYCIN) 80 mg in sodium chloride 0.9 % 100 mL IVPB  80 mg Intravenous Once Elba Brown MD        glucagon HCl (Diagnostic) injection 1 mg  1 mg Intramuscular Q15 Min PRN Elba Brown MD        haloperidol lactate (HALDOL) injection 3 mg  3 mg Intramuscular Once Elba Brown MD        hydrALAZINE (APRESOLINE) injection 20 mg  20 mg Intravenous Q6H PRN Elba Brown MD        HYDROcodone-acetaminophen (NORCO) 5-325 MG per tablet 1 tablet  1 tablet Oral Q6H PRN Elba Brown MD   1 tablet at 09/24/23 0506    hydroxychloroquine (PLAQUENIL) tablet 200 mg  200 mg Oral Daily Elba Brown MD   200 mg at 09/24/23 0947    Insulin Aspart (novoLOG) injection 0-14 Units  0-14 Units Subcutaneous TID AC Elba Brown MD        losartan (COZAAR) tablet 100 mg  100 mg Oral Daily Elba Brown MD   100 mg at 09/24/23 0947    melatonin tablet 5 mg  5 mg Oral Nightly PRN Elba Brown MD   5 mg at 09/23/23 2026    morphine injection 2 mg  2 mg Intravenous Q4H PRN Elba Brown MD   2 mg at 09/23/23 1540    nitroglycerin (NITROSTAT) SL tablet 0.4 mg  0.4 mg Sublingual Q5 Min PRN Elba Brown MD        ondansetron (ZOFRAN) injection 4 mg  4 mg Intravenous Q6H PRN Elba Brown MD        ondansetron (ZOFRAN) tablet 4 mg  4 mg Oral Q6H PRN Elba Brown MD        pantoprazole (PROTONIX) 40 mg in 100mL NS IVPB  8 mg/hr Intravenous Continuous Elba Brown MD 20 mL/hr at 09/24/23 1101 8 mg/hr at 09/24/23 1101    predniSONE (DELTASONE) tablet 5 mg  5 mg Oral Daily Elba Brown MD   5 mg at 09/24/23 0948    [START ON 9/25/2023] rivaroxaban (XARELTO) tablet 15 mg  15 mg Oral Daily Zev Hector MD        rOPINIRole (REQUIP) tablet 1 mg  1 mg Oral Nightly  Elba Brown MD   1 mg at 09/23/23 2026    sodium chloride 0.9 % flush 10 mL  10 mL Intravenous PRN Elba Brown MD        sodium chloride 0.9 % flush 10 mL  10 mL Intravenous Q12H Elba Brown MD   10 mL at 09/23/23 0812    sodium chloride 0.9 % flush 10 mL  10 mL Intravenous PRN Elba Brown MD        sodium chloride 0.9 % flush 10 mL  10 mL Intravenous Q12H Elba Brown MD   10 mL at 09/23/23 0811    sodium chloride 0.9 % flush 10 mL  10 mL Intravenous PRN Elba Brown MD        sodium chloride 0.9 % infusion 40 mL  40 mL Intravenous PRN Elba Brown MD        sodium chloride 0.9 % infusion 40 mL  40 mL Intravenous PRN Elba Brown MD        tamsulosin (FLOMAX) 24 hr capsule 0.8 mg  0.8 mg Oral Nightly Elba Brown MD   0.8 mg at 09/23/23 2026       Past Surgical History:   Procedure Laterality Date    CARDIAC CATHETERIZATION  03/26/2014    No epicardial coronary artery disease. Normal LV systolic function. EF 55%. No wall motion abnormalities. Systemic hypertension.       Social History     Socioeconomic History    Marital status:    Tobacco Use    Smoking status: Every Day     Packs/day: 0.50     Years: 15.00     Pack years: 7.50     Types: Cigarettes    Smokeless tobacco: Never    Tobacco comments:     encouraged smoking cessation    Vaping Use    Vaping Use: Never used   Substance and Sexual Activity    Alcohol use: No    Drug use: Never         Physical Exam        DATA REVIEWED:     EKG. I personally reviewed and interpreted the EKG.  Paced rhythm, occasional  native tachycardia    ECG/EMG Results (all)       Procedure Component Value Units Date/Time    ECG 12 Lead [[id number]] Resulted: 09/21/23 0600     Updated: 09/21/23 0600    ECG 12 Lead Bradycardia [[id number]] Collected: 09/21/23 0644     Updated: 09/21/23 0719     QT Interval 454 ms      QTC Interval 460 ms     Narrative:      Test Reason : Bradycardia  Blood Pressure :   */*   mmHG  Vent. Rate :  62 BPM      Atrial Rate :  65 BPM     P-R Int :   * ms          QRS Dur : 170 ms      QT Int : 454 ms       P-R-T Axes :  55 -29  88 degrees     QTc Int : 460 ms    Ventricular-paced rhythm  Abnormal ECG  When compared with ECG of 21-SEP-2023 03:14,  Electronic ventricular pacemaker has replaced Idioventricular rhythm  Vent. rate has increased BY  34 BPM    Referred By:            Confirmed By:     ECG 12 Lead Bradycardia [[id number]] Collected: 09/21/23 0314     Updated: 09/21/23 0721     QT Interval 558 ms      QTC Interval 380 ms     Narrative:      Test Reason : Bradycardia  Blood Pressure :   */*   mmHG  Vent. Rate :  28 BPM     Atrial Rate :  27 BPM     P-R Int :   * ms          QRS Dur : 146 ms      QT Int : 558 ms       P-R-T Axes :   * -72  76 degrees     QTc Int : 380 ms    Idioventricular rhythm  Left axis deviation  Right bundle branch block  Left ventricular hypertrophy with QRS widening and repolarization abnormality  Abnormal ECG  When compared with ECG of 25-MAR-2014 14:34,  Idioventricular rhythm has replaced Sinus rhythm  Vent. rate has decreased BY  36 BPM    Referred By:            Confirmed By:     SCANNED EKG [[id number]] Resulted: 09/21/23     Updated: 09/21/23 1334    Adult Transthoracic Echo Limited W/ Cont if Necessary Per Protocol [[id number]] Resulted: 09/21/23 1516     Updated: 09/21/23 1520     EF(MOD-bp) 66.5 %      LVIDd 5.1 cm      LVIDs 3.9 cm      IVSd 1.05 cm      LVPWd 1.36 cm      FS 22.7 %      IVS/LVPW 0.77 cm      ESV(cubed) 59.9 ml      LV Sys Vol (BSA corrected) 31.8 cm2      EDV(cubed) 130.0 ml      LV [place] Vol (BSA corrected) 95.9 cm2      LVOT area 3.7 cm2      LV mass(C)d 240.7 grams      LVOT diam 2.17 cm      EDV(MOD-sp2) 210.0 ml      EDV(MOD-sp4) 226.0 ml      ESV(MOD-sp2) 73.6 ml      ESV(MOD-sp4) 74.9 ml      SV(MOD-sp2) 136.4 ml      SV(MOD-sp4) 151.1 ml      SI(MOD-sp2) 57.9 ml/m2      SI(MOD-sp4) 64.1 ml/m2      EF(MOD-sp2) 65.0 %      EF(MOD-sp4) 66.9 %      MV E max dustin  125.0 cm/sec      MV A max dustin 138.9 cm/sec      MV E/A 0.90     LA ESV Index (BP) 30.5 ml/m2      SV(LVOT) 131.5 ml      RVIDd 4.0 cm      TAPSE (>1.6) 2.33 cm      LA dimension (2D)  3.5 cm      LV V1 max 157.2 cm/sec      LV V1 max PG 9.9 mmHg      LV V1 mean PG 5.3 mmHg      LV V1 VTI 35.5 cm      Ao pk dustin 278.3 cm/sec      Ao max PG 31.0 mmHg      Ao mean PG 16.2 mmHg      Ao V2 VTI 48.7 cm      DAVID(I,D) 2.7 cm2      MV max PG 8.6 mmHg      MV mean PG 2.7 mmHg      MV V2 VTI 49.1 cm      MV P1/2t 53.8 msec      MVA(P1/2t) 4.1 cm2      MVA(VTI) 2.7 cm2      MV dec slope 790.8 cm/sec2      TR max dustin 238.9 cm/sec      TR max PG 22.8 mmHg      RVSP(TR) 27.8 mmHg      RAP systole 5.0 mmHg      PA V2 max 173.4 cm/sec      Ao root diam 2.7 cm      ACS 1.83 cm     Narrative:        Left ventricular systolic function is normal. Left ventricular ejection   fraction appears to be 51 - 55%.    Left ventricular wall thickness is consistent with mild concentric   hypertrophy.    Left ventricular diastolic function was indeterminate.    The left atrial cavity is mildly dilated.    Estimated right ventricular systolic pressure from tricuspid   regurgitation is normal (<35 mmHg).    The aortic valve is abnormal in structure. There is moderate   calcification of the aortic valve. No significant aortic valve   regurgitation is present. Mild aortic valve stenosis is present. Mild   restriction to aortic valve opening.      EP/CRM Study [[id number]] Resulted: 09/22/23 1218     Updated: 09/22/23 1218    Narrative:      Placement of temporary venous pacemaker.    : Poornima Doan MD., FACC, FACP, [facility]    INDICATION: Third-degree heart block    Procedure:  After risks, benefits, and alternatives of the above-mentioned   procedure were explained to the patient  detail, informed consent was   obtained both verbally and in writing. The patient was taken to Cardiac   Catheterization Suite where the RUE was prepped and draped  in the usual   sterile fashion. The right internal jugular region was prepped and draped   in the usual sterile fashion. 2% lidocaine solution was used to infiltrate   the skin overlying the right internal jugular vein. Once adequate   anesthesia has been obtained, a thin-walled #18 gauge Argon needle was   used to cannulate the right internal jugular vein with ultrasound   guidance. A steel guidewire was then inserted through the needle into the   vessel without resistance. Small nick was then made in the skin and the   needle was removed. An 6 [place] venous sheath was then advanced over the   guidewire into the vascular lumen without resistance. The guidewire and   dilator were then removed. The sheath was then flushed.  A balloontipped   temporary venous pacemaker wire was advanced into the RV successful   capture.  Patient tolerated the procedure well.  Surgical sheath in place.  TVP was sutured in place at 35 cm.    Complications: None    Blood loss: Less than 5 mL    Specimen: None    Assessment:  Access with placement of TVP via right IJ    Recommendations:  Chest x-ray  Admit to CCU  Permanent pacemaker in the morning    SCANNED EKG [[id number]] Resulted: 09/21/23     Updated: 09/22/23 1327    SCANNED EKG [[id number]] Resulted: 09/21/23     Updated: 09/22/23 1328    SCANNED EKG [[id number]] Resulted: 09/21/23     Updated: 09/22/23 1330    EP/CRM Study [[id number]] Resulted: 09/22/23 1611     Updated: 09/22/23 1614    Narrative:      Date of Procedure: 09/22/23    Referring Physician: Dr. Doan    /ELECTROPHYSIOLOGIST: Dr. Brown    PROCEDURE(S) PERFORMED:    Implantation of a dual-chamber permanent pacemaker.       INDICATIONS FOR PROCDEDURE: Symptomatic complete heart block requiring   transvenous temporary pacing    MEDICATION(S) GIVEN TO PATIENT DURING THIS PROCEDURE:  IV conscious sedation by anesthesia.  Gentamicin  Ancef.    DESCRIPTION(S) OF THE PROCEDURE: The patient was brought to the    cardiovascular laboratory in a fasting, non-sedated state. The risks and   benefits of conscious sedation and implantation of dual-chamber permanent   pacemaker were explained to the patient and written, informed consent was   obtained. The patient was then prepped and draped in the usual sterile   manner. The right infraclavicular fossa area was then anesthetized with 1%   Lidocaine and the skin was incised using a scalpel. The pocket was created   along the prepectoralis fascia using both sharp and blunt dissection.   Access to the left subclavian vein was then obtained x2 using the modified   Seldinger technique and two, 7-Uzbek sheaths were passed over 2   guidewires and advanced to the left subclavian vein. The right ventricular   and right atrial leads were then passed through the sheaths and advanced   to the low right ventricular septum and high right atrium where they were   screwed in position. The right ventricular lead pacing threshold was   measured at 0.5 volts at 0.5 milliseconds with an R wave measured at 6   millivolts and a lead impedance measured at 680 ohms. The right atrial   lead pacing threshold measured at 0.5 volts at 0.5 milliseconds with a P   wave measured at 2.9 millivolts and a lead impedance measured at 360 ohms.   The leads were attached to the prepectoralis fascia using a 2-0 silk and a   sewing ring. The leads were attached to the generator, and the generator   was placed in the pocket after the pocket was irrigated with Ancef   solution. The pocket was closed with 2-0 Vicryl, and the skin was closed   with 3-0 Vicryl and 4-0 Vicryl. Steri-Strips and a pressure dressing were   then applied.  Right atrial lead  is 2088 TC/46 and serial   number [id number].  Implantation date 9/22/2023.  Right ventricular lead    is Abbott model #2088 TC/52 and serial number EE [id number].    Implantation date 9/22/2023.  Lead status with the pacemaker.    's  Abbott model number PM 2272 and serial #[id number].    Implantation date 9/22/2023  Transvenous temporary pacemaker was removed and hemostasis was achieved.    COMPLICATIONS: None  ESTIMATED BLOOD LOSS: Minimal  SPECIMENS: None    FINAL PROGRAM PARAMETERS: The device was set at DDDR with a lower rate of   60 beats per minute and upper rate of 120 beats per minute with prolonged   A-V delay.     FINAL IMPRESSIONS:   1.   Successful implantation of a dual-chamber permanent pacemaker.     RECOMMENDATION(S):  The patient will be monitored on telemetry.      Elba Brown MD  09/22/23  16:11 CDT      Part of this note may be an electronic transcription/translation of spoken   language to printed text using the Dragon Dictation system.               ---------------------------------------------------  TTE/NOHEMI:  Results for orders placed during the hospital encounter of 09/21/23    Adult Transthoracic Echo Limited W/ Cont if Necessary Per Protocol    Interpretation Summary    Left ventricular systolic function is normal. Left ventricular ejection fraction appears to be 51 - 55%.    Left ventricular wall thickness is consistent with mild concentric hypertrophy.    Left ventricular diastolic function was indeterminate.    The left atrial cavity is mildly dilated.    Estimated right ventricular systolic pressure from tricuspid regurgitation is normal (<35 mmHg).    The aortic valve is abnormal in structure. There is moderate calcification of the aortic valve. No significant aortic valve regurgitation is present. Mild aortic valve stenosis is present. Mild restriction to aortic valve opening.        Lab Results (last 24 hours)       Procedure Component Value Units Date/Time    CBC & Differential [[id number]]  (Abnormal) Collected: 09/23/23 1303    Specimen: Blood Updated: 09/23/23 1316    Narrative:      The following orders were created for panel order CBC & Differential.  Procedure                               Abnormality          Status                     ---------                               -----------         ------                     CBC Auto Differential[[id number]]        Abnormal            Final result                 Please view results for these tests on the individual orders.    Comprehensive Metabolic Panel [[id number]]  (Abnormal) Collected: 09/23/23 1303    Specimen: Blood Updated: 09/23/23 1334     Glucose 144 mg/dL      BUN 30 mg/dL      Creatinine 2.01 mg/dL      Sodium 141 mmol/L      Potassium 4.1 mmol/L      Chloride 102 mmol/L      CO2 30.0 mmol/L      Calcium 8.4 mg/dL      Total Protein 5.4 g/dL      Albumin 2.6 g/dL      ALT (SGPT) 8 U/L      AST (SGOT) 21 U/L      Alkaline Phosphatase 48 U/L      Total Bilirubin 0.4 mg/dL      Globulin 2.8 gm/dL      A/G Ratio 0.9 g/dL      BUN/Creatinine Ratio 14.9     Anion Gap 9.0 mmol/L      eGFR 34.6 mL/min/1.73     Narrative:      GFR Normal >60  Chronic Kidney Disease <60  Kidney Failure <15    The GFR formula is only valid for adults with stable renal function between ages 18 and 70.    CBC Auto Differential [[id number]]  (Abnormal) Collected: 09/23/23 1303    Specimen: Blood Updated: 09/23/23 1316     WBC 9.64 10*3/mm3      RBC 3.17 10*6/mm3      Hemoglobin 10.8 g/dL      Hematocrit 32.9 %      .8 fL      MCH 34.1 pg      MCHC 32.8 g/dL      RDW 13.7 %      RDW-SD 52.1 fl      MPV 9.3 fL      Platelets 108 10*3/mm3      Neutrophil % 85.8 %      Lymphocyte % 4.9 %      Monocyte % 8.1 %      Eosinophil % 0.2 %      Basophil % 0.2 %      Immature Grans % 0.8 %      Neutrophils, Absolute 8.27 10*3/mm3      Lymphocytes, Absolute 0.47 10*3/mm3      Monocytes, Absolute 0.78 10*3/mm3      Eosinophils, Absolute 0.02 10*3/mm3      Basophils, Absolute 0.02 10*3/mm3      Immature Grans, Absolute 0.08 10*3/mm3      nRBC 0.0 /100 WBC     POC Glucose Once [[id number]]  (Normal) Collected: 09/23/23 1650    Specimen: Blood Updated: 09/23/23 1703     Glucose 116 mg/dL      Comment:  RN NotifiedOperator: [id number] PHYLICIA MCDONOUGHMeter ID: [id number]       POC Glucose Once [[id number]]  (Abnormal) Collected: 09/23/23 2009    Specimen: Blood Updated: 09/23/23 2023     Glucose 156 mg/dL      Comment: Result Not ConfirmedOperator: [id number] STEWART KEENANMeter ID: [id number]       POC Glucose Once [[id number]]  (Normal) Collected: 09/24/23 0603    Specimen: Blood Updated: 09/24/23 0641     Glucose 107 mg/dL      Comment: : [id number] LINK SINGHMeter ID: [id number]       POC Glucose Once [[id number]]  (Normal) Collected: 09/24/23 1032    Specimen: Blood Updated: 09/24/23 1116     Glucose 130 mg/dL      Comment: RN NotifiedOperator: [id number] MIKHAIL CIERRAMeter ID: [id number]       CBC & Differential [[id number]]  (Abnormal) Collected: 09/24/23 1209    Specimen: Blood Updated: 09/24/23 1222    Narrative:      The following orders were created for panel order CBC & Differential.  Procedure                               Abnormality         Status                     ---------                               -----------         ------                     CBC Auto Differential[[id number]]        Abnormal            Final result               Scan Slide[[id number]]                                                                    Please view results for these tests on the individual orders.    Comprehensive Metabolic Panel [[id number]] Collected: 09/24/23 1209    Specimen: Blood Updated: 09/24/23 1211    CBC Auto Differential [[id number]]  (Abnormal) Collected: 09/24/23 1209    Specimen: Blood Updated: 09/24/23 1222     WBC 7.08 10*3/mm3      RBC 3.04 10*6/mm3      Hemoglobin 10.2 g/dL      Hematocrit 32.4 %      .6 fL      MCH 33.6 pg      MCHC 31.5 g/dL      RDW 13.6 %      RDW-SD 53.4 fl      MPV 9.3 fL      Platelets 106 10*3/mm3      Neutrophil % 75.6 %      Lymphocyte % 8.6 %      Monocyte % 14.0 %      Eosinophil % 0.7 %      Basophil % 0.3 %      Immature Grans % 0.8 %       Neutrophils, Absolute 5.35 10*3/mm3      Lymphocytes, Absolute 0.61 10*3/mm3      Monocytes, Absolute 0.99 10*3/mm3      Eosinophils, Absolute 0.05 10*3/mm3      Basophils, Absolute 0.02 10*3/mm3      Immature Grans, Absolute 0.06 10*3/mm3      nRBC 0.0 /100 WBC               A/P    Third-degree heart block  Atrial fibrillation status post previous EP study and ablation    Patient presented with complete heart block requiring transvenous pacing and subsequent underwent dual-chamber pacemaker on Friday.  Pacemaker site is clean and intact without evidence of hematoma.  Continue to encourage ambulation  Xarelto resumed for his history of atrial fibrillation with elevated [id number] score.  Telemetry is showing episodes of rapid ventricular rate suggesting possible short runs of A-fib or PAT.  Now that he has a permanent pacemaker we can start him on Metoprolol for better rate control              Part of this note may be an electronic transcription/translation of spoken language to printed text using the Dragon Dictation System.

## 2023-09-25 ENCOUNTER — READMISSION MANAGEMENT (OUTPATIENT)
Dept: CALL CENTER | Facility: HOSPITAL | Age: 73
End: 2023-09-25

## 2023-09-25 VITALS
TEMPERATURE: 97.9 F | HEIGHT: 73 IN | SYSTOLIC BLOOD PRESSURE: 141 MMHG | DIASTOLIC BLOOD PRESSURE: 63 MMHG | WEIGHT: 242.2 LBS | HEART RATE: 82 BPM | RESPIRATION RATE: 18 BRPM | OXYGEN SATURATION: 93 % | BODY MASS INDEX: 32.1 KG/M2

## 2023-09-25 LAB
GLUCOSE BLDC GLUCOMTR-MCNC: 132 MG/DL (ref 70–130)
GLUCOSE BLDC GLUCOMTR-MCNC: 98 MG/DL (ref 70–130)

## 2023-09-25 PROCEDURE — 63710000001 PREDNISONE PER 5 MG: Performed by: INTERNAL MEDICINE

## 2023-09-25 PROCEDURE — 82948 REAGENT STRIP/BLOOD GLUCOSE: CPT

## 2023-09-25 PROCEDURE — 97110 THERAPEUTIC EXERCISES: CPT

## 2023-09-25 PROCEDURE — 99024 POSTOP FOLLOW-UP VISIT: CPT | Performed by: INTERNAL MEDICINE

## 2023-09-25 PROCEDURE — 97116 GAIT TRAINING THERAPY: CPT

## 2023-09-25 PROCEDURE — 99232 SBSQ HOSP IP/OBS MODERATE 35: CPT | Performed by: INTERNAL MEDICINE

## 2023-09-25 RX ORDER — AMLODIPINE BESYLATE 5 MG/1
5 TABLET ORAL
Qty: 30 TABLET | Refills: 0 | Status: SHIPPED | OUTPATIENT
Start: 2023-09-26

## 2023-09-25 RX ORDER — METOPROLOL SUCCINATE 50 MG/1
50 TABLET, EXTENDED RELEASE ORAL EVERY EVENING
Qty: 30 TABLET | Refills: 0 | Status: SHIPPED | OUTPATIENT
Start: 2023-09-25

## 2023-09-25 RX ORDER — AMIODARONE HYDROCHLORIDE 200 MG/1
200 TABLET ORAL
Status: DISCONTINUED | OUTPATIENT
Start: 2023-09-25 | End: 2023-09-25 | Stop reason: HOSPADM

## 2023-09-25 RX ORDER — AMIODARONE HYDROCHLORIDE 200 MG/1
200 TABLET ORAL 2 TIMES DAILY
Qty: 60 TABLET | Refills: 0 | Status: SHIPPED | OUTPATIENT
Start: 2023-09-25 | End: 2023-10-25

## 2023-09-25 RX ORDER — METOPROLOL SUCCINATE 50 MG/1
50 TABLET, EXTENDED RELEASE ORAL EVERY EVENING
Status: DISCONTINUED | OUTPATIENT
Start: 2023-09-25 | End: 2023-09-25 | Stop reason: HOSPADM

## 2023-09-25 RX ADMIN — CANAGLIFLOZIN 300 MG: 300 TABLET, FILM COATED ORAL at 08:58

## 2023-09-25 RX ADMIN — AMLODIPINE BESYLATE 5 MG: 5 TABLET ORAL at 08:58

## 2023-09-25 RX ADMIN — HYDROCODONE BITARTRATE AND ACETAMINOPHEN 1 TABLET: 5; 325 TABLET ORAL at 08:56

## 2023-09-25 RX ADMIN — RIVAROXABAN 15 MG: 15 TABLET, FILM COATED ORAL at 08:58

## 2023-09-25 RX ADMIN — BUMETANIDE 2 MG: 1 TABLET ORAL at 08:57

## 2023-09-25 RX ADMIN — Medication 10 ML: at 08:57

## 2023-09-25 RX ADMIN — SODIUM CHLORIDE 8 MG/HR: 900 INJECTION INTRAVENOUS at 00:14

## 2023-09-25 RX ADMIN — SODIUM CHLORIDE 8 MG/HR: 900 INJECTION INTRAVENOUS at 08:57

## 2023-09-25 RX ADMIN — PREDNISONE 5 MG: 5 TABLET ORAL at 08:58

## 2023-09-25 RX ADMIN — SODIUM CHLORIDE 8 MG/HR: 900 INJECTION INTRAVENOUS at 04:32

## 2023-09-25 RX ADMIN — FLUOXETINE 10 MG: 10 CAPSULE ORAL at 08:58

## 2023-09-25 RX ADMIN — HYDROXYCHLOROQUINE SULFATE 200 MG: 200 TABLET, FILM COATED ORAL at 08:58

## 2023-09-25 RX ADMIN — Medication 1000 UNITS: at 08:58

## 2023-09-25 RX ADMIN — Medication 10 ML: at 08:59

## 2023-09-25 RX ADMIN — LOSARTAN POTASSIUM 100 MG: 50 TABLET, FILM COATED ORAL at 08:58

## 2023-09-25 RX ADMIN — AMIODARONE HYDROCHLORIDE 200 MG: 200 TABLET ORAL at 08:58

## 2023-09-25 NOTE — DISCHARGE SUMMARY
[facility] Medicine Services  DISCHARGE SUMMARY       Date of Admission: 9/21/2023  Date of Discharge:  9/25/2023  Primary Care Physician: Maisha Toure MD    Presenting Problem/History of Present Illness:  Third degree heart block [I44.2]       Final Discharge Diagnoses:  Active Hospital Problems    Diagnosis     **Third degree heart block     Macrocytic anemia     Elevated troponin     Hypertension     Coffee ground emesis     Stage 3b chronic kidney disease        Consults:   Consults       Date and Time Order Name Status Description    9/22/2023  5:23 PM Inpatient Cardiology Consult      9/22/2023 12:31 AM Inpatient Gastroenterology Consult Completed     9/21/2023  4:09 AM Hospitalist (on-call MD unless specified)      9/21/2023  4:09 AM Cardiology - Primary (on-call MD unless specified) Completed             Procedures Performed: Procedure(s):  ESOPHAGOGASTRODUODENOSCOPY           09/22 1633 UPPER GI ENDOSCOPY    Pertinent Test Results:   Lab Results (most recent)       Procedure Component Value Units Date/Time    POC Glucose Once [[id number]]  (Abnormal) Collected: 09/25/23 1040    Specimen: Blood Updated: 09/25/23 1059     Glucose 132 mg/dL      Comment: RN NotifiedOperator: [id number] VALERA ERIBERTOrosy ID: [id number]       POC Glucose Once [[id number]]  (Normal) Collected: 09/25/23 0523    Specimen: Blood Updated: 09/25/23 0552     Glucose 98 mg/dL      Comment: : [id number] HAN FREYColeen ID: [id number]       Comprehensive Metabolic Panel [[id number]]  (Abnormal) Collected: 09/24/23 1209    Specimen: Blood Updated: 09/24/23 1240     Glucose 113 mg/dL      BUN 39 mg/dL      Creatinine 2.30 mg/dL      Sodium 140 mmol/L      Potassium 4.3 mmol/L      Comment: Slight hemolysis detected by analyzer. Results may be affected.        Chloride 103 mmol/L      CO2 28.0 mmol/L      Calcium 8.1 mg/dL      Total Protein 5.0 g/dL       Albumin 2.4 g/dL      ALT (SGPT) 7 U/L      AST (SGOT) 20 U/L      Alkaline Phosphatase 48 U/L      Total Bilirubin 0.3 mg/dL      Globulin 2.6 gm/dL      A/G Ratio 0.9 g/dL      BUN/Creatinine Ratio 17.0     Anion Gap 9.0 mmol/L      eGFR 29.4 mL/min/1.73     Narrative:      GFR Normal >60  Chronic Kidney Disease <60  Kidney Failure <15    The GFR formula is only valid for adults with stable renal function between ages 18 and 70.    CBC & Differential [[id number]]  (Abnormal) Collected: 09/24/23 1209    Specimen: Blood Updated: 09/24/23 1222    Narrative:      The following orders were created for panel order CBC & Differential.  Procedure                               Abnormality         Status                     ---------                               -----------         ------                     CBC Auto Differential[[id number]]        Abnormal            Final result               Scan Slide[[id number]]                                                                    Please view results for these tests on the individual orders.    CBC Auto Differential [[id number]]  (Abnormal) Collected: 09/24/23 1209    Specimen: Blood Updated: 09/24/23 1222     WBC 7.08 10*3/mm3      RBC 3.04 10*6/mm3      Hemoglobin 10.2 g/dL      Hematocrit 32.4 %      .6 fL      MCH 33.6 pg      MCHC 31.5 g/dL      RDW 13.6 %      RDW-SD 53.4 fl      MPV 9.3 fL      Platelets 106 10*3/mm3      Neutrophil % 75.6 %      Lymphocyte % 8.6 %      Monocyte % 14.0 %      Eosinophil % 0.7 %      Basophil % 0.3 %      Immature Grans % 0.8 %      Neutrophils, Absolute 5.35 10*3/mm3      Lymphocytes, Absolute 0.61 10*3/mm3      Monocytes, Absolute 0.99 10*3/mm3      Eosinophils, Absolute 0.05 10*3/mm3      Basophils, Absolute 0.02 10*3/mm3      Immature Grans, Absolute 0.06 10*3/mm3      nRBC 0.0 /100 WBC     Comprehensive Metabolic Panel [[id number]]  (Abnormal) Collected: 09/23/23 1303    Specimen: Blood Updated: 09/23/23 1334     Glucose 144  mg/dL      BUN 30 mg/dL      Creatinine 2.01 mg/dL      Sodium 141 mmol/L      Potassium 4.1 mmol/L      Chloride 102 mmol/L      CO2 30.0 mmol/L      Calcium 8.4 mg/dL      Total Protein 5.4 g/dL      Albumin 2.6 g/dL      ALT (SGPT) 8 U/L      AST (SGOT) 21 U/L      Alkaline Phosphatase 48 U/L      Total Bilirubin 0.4 mg/dL      Globulin 2.8 gm/dL      A/G Ratio 0.9 g/dL      BUN/Creatinine Ratio 14.9     Anion Gap 9.0 mmol/L      eGFR 34.6 mL/min/1.73     Narrative:      GFR Normal >60  Chronic Kidney Disease <60  Kidney Failure <15    The GFR formula is only valid for adults with stable renal function between ages 18 and 70.    CBC & Differential [[id number]]  (Abnormal) Collected: 09/23/23 1303    Specimen: Blood Updated: 09/23/23 1316    Narrative:      The following orders were created for panel order CBC & Differential.  Procedure                               Abnormality         Status                     ---------                               -----------         ------                     CBC Auto Differential[[id number]]        Abnormal            Final result                 Please view results for these tests on the individual orders.    CBC Auto Differential [[id number]]  (Abnormal) Collected: 09/23/23 1303    Specimen: Blood Updated: 09/23/23 1316     WBC 9.64 10*3/mm3      RBC 3.17 10*6/mm3      Hemoglobin 10.8 g/dL      Hematocrit 32.9 %      .8 fL      MCH 34.1 pg      MCHC 32.8 g/dL      RDW 13.7 %      RDW-SD 52.1 fl      MPV 9.3 fL      Platelets 108 10*3/mm3      Neutrophil % 85.8 %      Lymphocyte % 4.9 %      Monocyte % 8.1 %      Eosinophil % 0.2 %      Basophil % 0.2 %      Immature Grans % 0.8 %      Neutrophils, Absolute 8.27 10*3/mm3      Lymphocytes, Absolute 0.47 10*3/mm3      Monocytes, Absolute 0.78 10*3/mm3      Eosinophils, Absolute 0.02 10*3/mm3      Basophils, Absolute 0.02 10*3/mm3      Immature Grans, Absolute 0.08 10*3/mm3      nRBC 0.0 /100 WBC     TISSUE EXAM, [facility]  ([facility]) [[id number]] Collected: 09/22/23 1728    Specimen: Tissue from Gastric, Antrum; Tissue from Esophagus Updated: 09/22/23 1957    Folate [[id number]]  (Normal) Collected: 09/21/23 0325    Specimen: Blood Updated: 09/21/23 2037     Folate >20.00 ng/mL     Narrative:      Results may be falsely increased if patient taking Biotin.      Vitamin B12 [[id number]]  (Normal) Collected: 09/21/23 0325    Specimen: Blood Updated: 09/21/23 2037     Vitamin B-12 479 pg/mL     Narrative:      Results may be falsely increased if patient taking Biotin.      Protime-INR [[id number]]  (Normal) Collected: 09/21/23 1956    Specimen: Blood Updated: 09/21/23 2025     Protime 14.7 Seconds      INR 1.16    Narrative:      Therapeutic range for most indications is 2.0-3.0 INR,  or 2.5-3.5 for mechanical heart valves.    CBC (No Diff) [[id number]]  (Abnormal) Collected: 09/21/23 1956    Specimen: Blood Updated: 09/21/23 2011     WBC 11.88 10*3/mm3      RBC 3.29 10*6/mm3      Hemoglobin 11.9 g/dL      Hematocrit 33.9 %      .0 fL      MCH 36.2 pg      MCHC 35.1 g/dL      RDW 14.2 %      RDW-SD 50.7 fl      MPV 10.5 fL      Platelets 115 10*3/mm3     Basic Metabolic Panel [[id number]]  (Abnormal) Collected: 09/21/23 0643    Specimen: Blood Updated: 09/21/23 1946     Glucose 191 mg/dL      BUN 35 mg/dL      Creatinine 2.02 mg/dL      Sodium 139 mmol/L      Potassium 4.6 mmol/L      Comment: Slight hemolysis detected by analyzer. Results may be affected.        Chloride 102 mmol/L      CO2 24.0 mmol/L      Calcium 9.4 mg/dL      BUN/Creatinine Ratio 17.3     Anion Gap 13.0 mmol/L      eGFR 34.4 mL/min/1.73     Narrative:      GFR Normal >60  Chronic Kidney Disease <60  Kidney Failure <15    The GFR formula is only valid for adults with stable renal function between ages 18 and 70.    Occult Blood Gastric / Duodenum - Gastric Contents, [[id number]]  (Abnormal) Collected: 09/21/23 9219    Specimen: Gastric Contents Updated: 09/21/23  1809     Gastroccult Positive    High Sensitivity Troponin T 2Hr [[id number]]  (Abnormal) Collected: 09/21/23 0643    Specimen: Blood Updated: 09/21/23 0726     HS Troponin T 54 ng/L      Troponin T Delta 8 ng/L     Narrative:      High Sensitive Troponin T Reference Range:  <10.0 ng/L- Negative Female for AMI  <15.0 ng/L- Negative Male for AMI  >=10 - Abnormal Female indicating possible myocardial injury.  >=15 - Abnormal Male indicating possible myocardial injury.   Clinicians would have to utilize clinical acumen, EKG, Troponin, and serial changes to determine if it is an Acute Myocardial Infarction or myocardial injury due to an underlying chronic condition.         Extra Tubes [[id number]] Collected: 09/21/23 0325    Specimen: Blood Updated: 09/21/23 0430    Narrative:      The following orders were created for panel order Extra Tubes.  Procedure                               Abnormality         Status                     ---------                               -----------         ------                     Gold Top - SST[[id number]]                                   Final result               Light Blue Top[[id number]]                                   Final result                 Please view results for these tests on the individual orders.    Gold Top - SST [[id number]] Collected: 09/21/23 0325    Specimen: Blood Updated: 09/21/23 0430     Extra Tube Hold for add-ons.     Comment: Auto resulted.       Light Blue Top [[id number]] Collected: 09/21/23 0325    Specimen: Blood Updated: 09/21/23 0430     Extra Tube Hold for add-ons.     Comment: Auto resulted       High Sensitivity Troponin T [[id number]]  (Abnormal) Collected: 09/21/23 0325    Specimen: Blood Updated: 09/21/23 0359     HS Troponin T 46 ng/L     Narrative:      High Sensitive Troponin T Reference Range:  <10.0 ng/L- Negative Female for AMI  <15.0 ng/L- Negative Male for AMI  >=10 - Abnormal Female indicating possible myocardial injury.  >=15 - Abnormal  Male indicating possible myocardial injury.   Clinicians would have to utilize clinical acumen, EKG, Troponin, and serial changes to determine if it is an Acute Myocardial Infarction or myocardial injury due to an underlying chronic condition.         TSH [[id number]]  (Normal) Collected: 09/21/23 0325    Specimen: Blood Updated: 09/21/23 0359     TSH 2.290 uIU/mL     BNP [[id number]]  (Abnormal) Collected: 09/21/23 0325    Specimen: Blood Updated: 09/21/23 0359     proBNP 1,048.0 pg/mL     Narrative:      Among patients with dyspnea, NT-proBNP is highly sensitive for the detection of acute congestive heart failure. In addition NT-proBNP of <300 pg/ml effectively rules out acute congestive heart failure with 99% negative predictive value.      Magnesium [[id number]]  (Normal) Collected: 09/21/23 0325    Specimen: Blood Updated: 09/21/23 0353     Magnesium 2.1 mg/dL           Imaging Results (Most Recent)       Procedure Component Value Units Date/Time    XR Chest 1 View [[id number]] Collected: 09/22/23 1807     Updated: 09/22/23 2048    Narrative:      INDICATION:  post PPM placement.    COMPARISON:  None relevant.    FINDINGS:  Cardiac size is not enlarged.  No pleural effusion or pneumothorax.  No dense  airspace consolidation. ICD is present.      Impression:      Negative for pneumothorax.      XR Abdomen KUB [[id number]] Collected: 09/22/23 1351     Updated: 09/22/23 1355    Narrative:      Single view of the KUB area, compared to the study from one day before, shows  resolution of gastric distention after placement of the orogastric tube.    No distended loops of large and small bowel are seen.  No large amount of free  air is seen.  No abnormal soft tissue mass is seen.    Stable calcifications are seen in the pelvis.    XR Abdomen KUB [[id number]] Collected: 09/21/23 1310     Updated: 09/21/23 1314    Narrative:      XR ABDOMEN KUB    HISTORY: vomiting    COMPARISON:    FINDINGS:    Distended stomach.  There is  air seen throughout large and small bowel loops as  well..    No definite findings of free intraperitoneal air.    The osseous structures demonstrate no acute abnormality.    Calcifications in the left hemipelvis.  I cannot clear the distal left ureter.    Stool burden: moderate      Impression:      1. Distended stomach.  There is air seen throughout large and small bowel loops.  Partial gastric outlet obstruction or gastroparesis not excluded.  2.  Several calcification seen in the lower left pelvis.  These may be  phleboliths.  I cannot clear the distal left ureter.          XR Chest 1 View [[id number]] Collected: 09/21/23 1230     Updated: 09/21/23 1235    Narrative:      Single frontal view of chest, compared to the study from 9/21/2023, shows  partial retraction of the right subclavian catheter with its tip now projecting  into the central left brachiocephalic vein across the midline.    A second catheter introduced through the right IJ approach appears unchanged.    Other findings are also unchanged.    XR Chest AP [[id number]] Collected: 09/21/23 0758     Updated: 09/21/23 0820    Narrative:      INDICATION:  TVP placement.    COMPARISON:  9/21/2023, 0319 hours    FINDINGS:  Small bore catheter is seen over the right upper chest with its tip projected  over the anticipated location of the proximal SVC.  This apparently represents a  PICC line.  Clinical correlation needed.    There is a TVP entering from the right jugular.  Tip is within the anticipated  location in the right ventricle.  There is no pneumothorax.  Lungs remain clear.    XR Chest 1 View [[id number]] Collected: 09/21/23 0339     Updated: 09/21/23 0342    Narrative:      XR CHEST 1 VIEW    HISTORY: bradycardia    COMPARISON: None.    FINDINGS:  Frontal view of the chest was obtained.    The lungs are clear.There are low lung volumes.  The cardiac silhouette is  enlarged. There is no significant pleural effusion or pneumothorax.    The osseous  "structures and surrounding soft tissues demonstrate no acute  abnormality.    Upper abdomen is unremarkable.        Impression:      1. No acute cardiopulmonary disease.                Chief Complaint on Day of Discharge: None    Hospital Course:  The patient is a 72 y.o. male who presented to [facility] with bradycardia.  Patient was found to have third-degree heart block and emergent temporary pacer was placed.  Patient then underwent permanent pacemaker placement with good results.  Patient had some nausea and vomiting with coffee-ground emesis.  GI was consulted and EGD was done which was unremarkable.  Initially patient's anticoagulation was held, but after negative EGD anticoagulation was restarted.  Patient tolerated this well and was cleared by cardiology and GI for discharge.  He will follow-up with pacemaker clinic for wound check in 4 days and with Dr. Brown in 1 month.  He will follow-up with his PCP in 1 to 2 weeks.    Condition on Discharge: Hemodynamically stable    Physical Exam on Discharge:  /71 (BP Location: Right arm, Patient Position: Lying)   Pulse 64   Temp 98.1 °F (36.7 °C) (Temporal)   Resp 18   Ht 185.4 cm (72.99\")   Wt 110 kg (242 lb 3.2 oz)   SpO2 92%   BMI 31.96 kg/m²   Physical Exam  Vitals reviewed.   Constitutional:       Appearance: He is well-developed.   HENT:      Head: Normocephalic and atraumatic.   Eyes:      Pupils: Pupils are equal, round, and reactive to light.   Cardiovascular:      Rate and Rhythm: Normal rate and regular rhythm.      Heart sounds: Normal heart sounds. No murmur heard.    No friction rub. No gallop.   Pulmonary:      Effort: Pulmonary effort is normal. No respiratory distress.      Breath sounds: Normal breath sounds. No wheezing or rales.   Chest:      Chest wall: No tenderness.   Abdominal:      General: Bowel sounds are normal. There is no distension.      Palpations: Abdomen is soft.      Tenderness: There is no abdominal " tenderness.   Musculoskeletal:      Cervical back: Normal range of motion and neck supple.   Psychiatric:         Behavior: Behavior normal.         Discharge Disposition:  Home or Self Care    Discharge Medications:     Discharge Medications        New Medications        Instructions Start Date   amLODIPine 5 MG tablet  Commonly known as: NORVASC   5 mg, Oral, Every 24 Hours Scheduled   Start Date: September 26, 2023     metoprolol succinate XL 50 MG 24 hr tablet  Commonly known as: TOPROL-XL   50 mg, Oral, Every Evening             Continue These Medications        Instructions Start Date   Accu-Chek Guide test strip  Generic drug: glucose blood   Test 6 times daily. DX CODE: E11.65      amiodarone 200 MG tablet  Commonly known as: PACERONE   200 mg, Oral, 2 Times Daily      Baqsimi One Pack 3 MG/DOSE powder  Generic drug: Glucagon   1 each, Nasal, As Needed, Apply intranasal if hypoglycemia      bumetanide 2 MG tablet  Commonly known as: BUMEX   2 mg, Oral, Daily, Pt takes 1-3 tablets daily      cetirizine 10 MG tablet  Commonly known as: zyrTEC   10 mg, Oral, Daily      DAILY MULTIVITAMIN PO   1 capsule, Oral, Daily      dapagliflozin Propanediol 10 MG tablet   Oral      fish oil 1000 MG capsule capsule   1,000 mg, Oral, 2 Times Daily      FLUoxetine 10 MG capsule  Commonly known as: PROzac   10 mg, Oral, Daily      glucose monitor monitoring kit   [place] CONTOUR NEXT METER.  USE TO TEST 4 TIMES A DAY.  DX-E11.8      HumaLOG 100 UNIT/ML injection  Generic drug: insulin lispro   INJECT 150 UNITS DAILY VIA INSULIN PUMP (NEED TO BE SEEN)      HYDROcodone-acetaminophen  MG per tablet  Commonly known as: NORCO   1 tablet, Oral, Every 8 Hours PRN      hydroxychloroquine 200 MG tablet  Commonly known as: PLAQUENIL   200 mg, Oral, Daily      Lancets misc   Test 4 times daily , E11.649      losartan 25 MG tablet  Commonly known as: COZAAR   100 mg, Oral, Daily      nystatin 293623 UNIT/GM powder  Commonly known as:  MYCOSTATIN   Topical, 4 Times Daily      potassium chloride 10 MEQ CR tablet   20 mEq, Oral, Daily      predniSONE 10 MG tablet  Commonly known as: DELTASONE   5 mg, Oral, Daily      rOPINIRole 1 MG tablet  Commonly known as: REQUIP   1 mg, Oral, Nightly, Take 1 hour before bedtime.      tamsulosin 0.4 MG capsule 24 hr capsule  Commonly known as: FLOMAX   2 capsules, Oral, Nightly      vitamin D 1.25 MG (64640 UT) capsule capsule  Commonly known as: ERGOCALCIFEROL   50,000 Units, Oral, Every 7 Days      Xarelto 15 MG tablet  Generic drug: rivaroxaban   15 mg, Oral, Daily      Zetia 10 MG tablet  Generic drug: ezetimibe   10 mg, Oral, Daily      zolpidem 10 MG tablet  Commonly known as: AMBIEN   10 mg, Oral, Nightly PRN             Stop These Medications      clindamycin 300 MG capsule  Commonly known as: CLEOCIN     metoprolol tartrate 25 MG tablet  Commonly known as: LOPRESSOR     penicillin v potassium 500 MG tablet  Commonly known as: VEETID     sulfamethoxazole-trimethoprim 800-160 MG per tablet  Commonly known as: BACTRIM DS,SEPTRA DS              Discharge Diet:   Diet Instructions       Diet: Cardiac Diets, Diabetic Diets; Healthy Heart (2-3 Na+); Regular Texture (IDDSI 7); Thin (IDDSI 0); Consistent Carbohydrate      Discharge Diet:  Cardiac Diets  Diabetic Diets       Cardiac Diet: Healthy Heart (2-3 Na+)    Texture: Regular Texture (IDDSI 7)    Fluid Consistency: Thin (IDDSI 0)    Diabetic Diet: Consistent Carbohydrate            Activity at Discharge:   Activity Instructions       Other Activity Restrictions      Type of Restriction:  Other  Lifting  Driving  Bathing       Driving Restrictions: No Driving    Explain Other Restrictions: no lifting right arm above shoulder. Site is to remain clean dry and intact.    Bathing Restrictions: No Shower    Lifting Restrictions: Lifting Restriction (Indicate Limit)    Weight Limit (Pounds): 10    Length of Lifting Restriction: Until cleared by cardiology           Follow-up Appointments:   Future Appointments   Date Time Provider Department Center   1/9/2024  2:30 PM [place] APRN [facility]       Test Results Pending at Discharge:   Pending Labs       Order Current Status    TISSUE EXAM, [facility] ([facility]) In process              This document has been electronically signed by Monroe Mueller MD on September 25, 2023 14:19 CDT      Time: 35 minutes

## 2023-09-25 NOTE — PLAN OF CARE
Goal Outcome Evaluation:  Plan of Care Reviewed With: patient        Progress: improving  Outcome Evaluation: pt sup<>sit with SBA, sit<>stand with CGA, pt ambulated 140` with RW & CGA. pt would benefit from home with assistance & HHPT      Anticipated Discharge Disposition (PT): home with assist

## 2023-09-25 NOTE — PLAN OF CARE
Goal Outcome Evaluation:                 No apparent complications related to pacemaker placement. Blood pressure on high side of normal. Other vital signs stable. Pt only complains of chronic pain in bilateral knees and lower back. Pt is being discharged today.

## 2023-09-25 NOTE — THERAPY TREATMENT NOTE
Acute Care - Physical Therapy Treatment Note  [facility]     Patient Name: Jovan Hardwick  : 1950  MRN: [id number]  Today's Date: 2023   Onset of Illness/Injury or Date of Surgery: 23  Visit Dx:     ICD-10-CM ICD-9-CM   1. Third degree heart block  I44.2 426.0   2. CHB (complete heart block)  I44.2 426.0   3. Coffee ground emesis  K92.0 578.0   4. Impaired mobility and activities of daily living [Z74.09, Z78.9 (ICD-10-CM)]  Z74.09 V49.89    Z78.9    5. Impaired functional mobility, balance, gait, and endurance [Z74.09 (ICD-10-CM)]  Z74.09 V49.89     Patient Active Problem List   Diagnosis    Type 2 diabetes mellitus with hypoglycemia without coma, with long-term current use of insulin    Mixed hyperlipidemia    Essential hypertension    Lupus    Class 2 obesity without serious comorbidity with body mass index (BMI) of 36.0 to 36.9 in adult    Smoking    Diabetic polyneuropathy associated with type 2 diabetes mellitus    Stage 3b chronic kidney disease    Third degree heart block    Macrocytic anemia    Elevated troponin    Hypertension    Coffee ground emesis     Past Medical History:   Diagnosis Date    Diabetic neuropathy     Elevated blood pressure     Hypercholesterolemia     Lupus erythematosus     Tobacco user     Type 2 diabetes mellitus with hyperglycemia     Type 2 diabetes mellitus, uncontrolled      Past Surgical History:   Procedure Laterality Date    CARDIAC CATHETERIZATION  2014    No epicardial coronary artery disease. Normal LV systolic function. EF 55%. No wall motion abnormalities. Systemic hypertension.    CARDIAC ELECTROPHYSIOLOGY PROCEDURE Left 2023    Procedure: Pacemaker DC new;  Surgeon: Elba Brown MD;  Location: [facility] CATH INVASIVE LOCATION;  Service: Cardiology;  Laterality: Left;    CARDIAC ELECTROPHYSIOLOGY PROCEDURE N/A 2023    Procedure: Temporary Pacemaker;  Surgeon: Poornima Doan MD;  Location: [facility] CATH INVASIVE LOCATION;  Service:  Cardiology;  Laterality: N/A;     PT Assessment (last 12 hours)       PT Evaluation and Treatment       Row Name 09/25/23 1443          Physical Therapy Time and Intention    Subjective Information no complaints  -TA     Document Type therapy note (daily note)  -TA     Mode of Treatment physical therapy  -TA       Row Name 09/25/23 1443          General Information    Patient Profile Reviewed yes  -TA     Existing Precautions/Restrictions fall;oxygen therapy device and L/min;pacemaker  -TA       Row Name 09/25/23 1443          Pain    Pretreatment Pain Rating 0/10 - no pain  -TA     Posttreatment Pain Rating 0/10 - no pain  -TA       Row Name 09/25/23 1443          Cognition    Orientation Status (Cognition) oriented x 4  -TA     Personal Safety Interventions fall prevention program maintained;gait belt;muscle strengthening facilitated;nonskid shoes/slippers when out of bed;supervised activity  -TA       Row Name 09/25/23 1443          Bed Mobility    Bed Mobility supine-sit;sit-supine  -TA     Supine-Sit [place] (Bed Mobility) standby assist  -TA     Sit-Supine [place] (Bed Mobility) standby assist  -TA     Assistive Device (Bed Mobility) bed rails;head of bed elevated  -TA       Row Name 09/25/23 1443          Transfers    Transfers sit-stand transfer;stand-sit transfer  -TA       Row Name 09/25/23 1443          Sit-Stand Transfer    Sit-Stand [place] (Transfers) contact guard  -TA     Assistive Device (Sit-Stand Transfers) walker, front-wheeled  -TA       Row Name 09/25/23 1443          Stand-Sit Transfer    Stand-Sit [place] (Transfers) contact guard  -TA     Assistive Device (Stand-Sit Transfers) walker, front-wheeled  -TA       Row Name 09/25/23 1443          Gait/Stairs (Locomotion)    [place] Level (Gait) contact guard  -TA     Assistive Device (Gait) walker, front-wheeled  -TA     Distance in Feet (Gait) 140`  -TA       Row Name 09/25/23 1443          Safety Issues, Functional  Mobility    Impairments Affecting Function (Mobility) endurance/activity tolerance;strength;balance  -TA       Row Name 09/25/23 1443          Hip (Therapeutic Exercise)    Hip (Therapeutic Exercise) AROM (active range of motion)  -TA     Hip AROM (Therapeutic Exercise) bilateral;flexion;aBduction;aDduction;sitting;20 repititions  -TA       Row Name 09/25/23 1443          Knee (Therapeutic Exercise)    Knee (Therapeutic Exercise) AROM (active range of motion)  -TA     Knee AROM (Therapeutic Exercise) bilateral;LAQ (long arc quad);sitting;20 repititions  -TA       Row Name 09/25/23 1443          Ankle (Therapeutic Exercise)    Ankle (Therapeutic Exercise) AROM (active range of motion)  -TA     Ankle AROM (Therapeutic Exercise) bilateral;dorsiflexion;plantarflexion;sitting;20 repititions  -TA       Row Name             Wound 09/22/23 1600 Right anterior clavicle Incision    Wound - Properties Group Placement Date: 09/22/23  -AY Placement Time: 1600  -AY Side: Right  -AY Orientation: anterior  -AY Location: clavicle  -AY Primary Wound Type: Incision  -AY Additional Comments: pacemaker placement.  -AY    Retired Wound - Properties Group Placement Date: 09/22/23  -AY Placement Time: 1600  -AY Side: Right  -AY Orientation: anterior  -AY Location: clavicle  -AY Primary Wound Type: Incision  -AY Additional Comments: pacemaker placement.  -AY    Retired Wound - Properties Group Date first assessed: 09/22/23  -AY Time first assessed: 1600  -AY Side: Right  -AY Location: clavicle  -AY Primary Wound Type: Incision  -AY Additional Comments: pacemaker placement.  -AY      Row Name 09/25/23 1443          Plan of Care Review    Plan of Care Reviewed With patient  -TA     Progress improving  -TA     Outcome Evaluation pt sup<>sit with SBA, sit<>stand with CGA, pt ambulated 140` with RW & CGA. pt would benefit from home with assistance & HHPT  -TA       Row Name 09/25/23 1443          Vital Signs    Pre Systolic BP Rehab 137  -TA      Pre Treatment Diastolic BP 69  -TA     Post Systolic BP Rehab 135  -TA     Post Treatment Diastolic BP 63  -TA     Pretreatment Heart Rate (beats/min) 71  -TA     Intratreatment Heart Rate (beats/min) 111  -TA     Posttreatment Heart Rate (beats/min) 108  -TA     Pre SpO2 (%) 93  -TA     O2 Delivery Pre Treatment room air  -TA     Intra SpO2 (%) 93  -TA     O2 Delivery Intra Treatment room air  -TA     Post SpO2 (%) 92  -TA     O2 Delivery Post Treatment room air  -TA     Pre Patient Position Supine  -TA     Post Patient Position Supine  -TA       Row Name 09/25/23 1443          Positioning and Restraints    Pre-Treatment Position in bed  -TA     Post Treatment Position bed  -TA     In Bed supine;call light within reach  -TA       Row Name 09/25/23 1443          Therapy Assessment/Plan (PT)    Rehab Potential (PT) good, to achieve stated therapy goals  -TA     Criteria for Skilled Interventions Met (PT) yes;meets criteria;skilled treatment is necessary  -TA     Therapy Frequency (PT) other (see comments)  daily  -TA     Comment, Therapy Assessment/Plan (PT) continue  -TA       Row Name 09/25/23 1443          Bed Mobility Goal 1 (PT)    Activity/Assistive Device (Bed Mobility Goal 1, PT) bed mobility activities, all  -TA     [place] Level/Cues Needed (Bed Mobility Goal 1, PT) independent  -TA     Time Frame (Bed Mobility Goal 1, PT) by discharge  -TA     Progress/Outcomes (Bed Mobility Goal 1, PT) goal not met  -TA       Row Name 09/25/23 1443          Transfer Goal 1 (PT)    Activity/Assistive Device (Transfer Goal 1, PT) sit-to-stand/stand-to-sit;bed-to-chair/chair-to-bed  -TA     [place] Level/Cues Needed (Transfer Goal 1, PT) modified independence;independent  -TA     Time Frame (Transfer Goal 1, PT) by discharge  -TA     Progress/Outcome (Transfer Goal 1, PT) goal not met  -TA       Row Name 09/25/23 1443          Gait Training Goal 1 (PT)    Activity/Assistive Device (Gait Training Goal 1, PT) gait  (walking locomotion);decrease fall risk;increase endurance/gait distance  -TA     [place] Level (Gait Training Goal 1, PT) modified independence;independent  -TA     Distance (Gait Training Goal 1, PT) 300ft each trip  -TA     Time Frame (Gait Training Goal 1, PT) 3 days  -TA     Progress/Outcome (Gait Training Goal 1, PT) goal not met  -TA       Row Name 09/25/23 1443          Stairs Goal 1 (PT)    Activity/Assistive Device (Stairs Goal 1, PT) ascending stairs;descending stairs;using handrail, left  -TA     [place] Level/Cues Needed (Stairs Goal 1, PT) modified independence  -TA     Number of Stairs (Stairs Goal 1, PT) 5  -TA     Time Frame (Stairs Goal 1, PT) by discharge  -TA     Progress/Outcome (Stairs Goal 1, PT) goal not met  -TA               User Key  (r) = Recorded By, (t) = Taken By, (c) = Cosigned By      Initials Name Provider Type    Shahla Obregon, RN Registered Nurse    Angie Michael, PTA Physical Therapist Assistant                    Physical Therapy Education       Title: PT OT SLP Therapies (Resolved)       Topic: Physical Therapy (Resolved)       Point: Mobility training (Resolved)       Learning Progress Summary             Patient Acceptance, E, NR by  at 9/23/2023 1827                         Point: Home exercise program (Resolved)       Learner Progress:  Not documented in this visit.              Point: Body mechanics (Resolved)       Learning Progress Summary             Patient Acceptance, E, NR by  at 9/23/2023 1827                         Point: Precautions (Resolved)       Learning Progress Summary             Patient Acceptance, E, NR by  at 9/23/2023 1827                                         User Key       Initials Effective Dates Name Provider Type Discipline    OSBALDO 06/16/21 -  Eli Dawson, PT Physical Therapist PT                  PT Recommendation and Plan  Anticipated Discharge Disposition (PT): home with assist  Therapy Frequency (PT): other (see  comments) (daily)  Plan of Care Reviewed With: patient  Progress: improving  Outcome Evaluation: pt sup<>sit with SBA, sit<>stand with CGA, pt ambulated 140` with RW & CGA. pt would benefit from home with assistance & HHPT   Outcome Measures       Row Name 09/25/23 1500 09/23/23 1600          How much help from another person do you currently need...    Turning from your back to your side while in flat bed without using bedrails? 3  -TA --     Moving from lying on back to sitting on the side of a flat bed without bedrails? 3  -TA --     Moving to and from a bed to a chair (including a wheelchair)? 3  -TA --     Standing up from a chair using your arms (e.g., wheelchair, bedside chair)? 3  -TA --     Climbing 3-5 steps with a railing? 2  -TA --     To walk in hospital room? 3  -TA --     AM-PAC 6 Clicks Score (PT) 17  -TA --        How much help from another is currently needed...    Putting on and taking off regular lower body clothing? -- 2  -RB     Bathing (including washing, rinsing, and drying) -- 2  -RB     Toileting (which includes using toilet bed pan or urinal) -- 2  -RB     Putting on and taking off regular upper body clothing -- 2  -RB     Taking care of personal grooming (such as brushing teeth) -- 3  -RB     Eating meals -- 4  -RB     AM-PAC 6 Clicks Score (OT) -- 15  -RB        Functional Assessment    Outcome Measure Options AM-PAC 6 Clicks Basic Mobility (PT)  -TA AM-PAC 6 Clicks Daily Activity (OT)  -RB               User Key  (r) = Recorded By, (t) = Taken By, (c) = Cosigned By      Initials Name Provider Type    RB Waldemar Woody, OT Occupational Therapist    Angie Michael, PTA Physical Therapist Assistant                     Time Calculation:    PT Charges       Row Name 09/25/23 1547             Time Calculation    Start Time 1443  -TA      Stop Time 1512  -TA      Time Calculation (min) 29 min  -TA      PT Received On 09/25/23  -TA         Time Calculation- PT    Total Timed Code Minutes- PT  29 minute(s)  -TA         Timed Charges    39270 - PT Therapeutic Exercise Minutes 14  -TA      79698 - Gait Training Minutes  15  -TA         Total Minutes    Timed Charges Total Minutes 29  -TA       Total Minutes 29  -TA                User Key  (r) = Recorded By, (t) = Taken By, (c) = Cosigned By      Initials Name Provider Type    Angie Michael PTA Physical Therapist Assistant                  Therapy Charges for Today       Code Description Service Date Service Provider Modifiers Qty    93896343149 HC PT THER PROC EA 15 MIN 9/25/2023 Angie Foreman PTA GP 1    00641425828 HC GAIT TRAINING EA 15 MIN 9/25/2023 Angie Foreman PTA GP 1            PT G-Codes  Outcome Measure Options: AM-PAC 6 Clicks Basic Mobility (PT)  AM-PAC 6 Clicks Score (PT): 17  AM-PAC 6 Clicks Score (OT): 15    Angie Foreman PTA  9/25/2023

## 2023-09-25 NOTE — PROGRESS NOTES
SUBJECTIVE:   9/25/2023  Chief Complaint:     Subjective      Patient is feeling better today.  Denied abdominal pain, nausea or vomiting.  Tolerating diet.  No further coffee-ground emesis.  Most recent hemoglobin is 10.8.    History:  Past Medical History:   Diagnosis Date    Diabetic neuropathy     Elevated blood pressure     Hypercholesterolemia     Lupus erythematosus     Tobacco user     Type 2 diabetes mellitus with hyperglycemia     Type 2 diabetes mellitus, uncontrolled      Past Surgical History:   Procedure Laterality Date    CARDIAC CATHETERIZATION  03/26/2014    No epicardial coronary artery disease. Normal LV systolic function. EF 55%. No wall motion abnormalities. Systemic hypertension.    CARDIAC ELECTROPHYSIOLOGY PROCEDURE N/A 9/21/2023    Procedure: Temporary Pacemaker;  Surgeon: Poornima Doan MD;  Location: [facility] CATH INVASIVE LOCATION;  Service: Cardiology;  Laterality: N/A;    CARDIAC ELECTROPHYSIOLOGY PROCEDURE Left 9/22/2023    Procedure: Pacemaker DC new;  Surgeon: Elba Brown MD;  Location: [facility] CATH INVASIVE LOCATION;  Service: Cardiology;  Laterality: Left;     Family History   Problem Relation Age of Onset    Diabetes Other      Social History     Tobacco Use    Smoking status: Every Day     Packs/day: 0.50     Years: 15.00     Pack years: 7.50     Types: Cigarettes    Smokeless tobacco: Never    Tobacco comments:     encouraged smoking cessation    Vaping Use    Vaping Use: Never used   Substance Use Topics    Alcohol use: No    Drug use: Never     Medications Prior to Admission   Medication Sig Dispense Refill Last Dose    bumetanide (BUMEX) 2 MG tablet Take 1 tablet by mouth Daily. Pt takes 1-3 tablets daily   Patient Taking Differently    FLUoxetine (PROzac) 10 MG capsule Take 1 capsule by mouth Daily.       HumaLOG 100 UNIT/ML injection INJECT 150 UNITS DAILY VIA INSULIN PUMP (NEED TO BE SEEN) 150 mL 2     HYDROcodone-acetaminophen (NORCO)  MG per tablet Take 1  tablet by mouth Every 8 (Eight) Hours As Needed.       hydroxychloroquine (PLAQUENIL) 200 MG tablet Take 1 tablet by mouth Daily.       losartan (COZAAR) 25 MG tablet Take 4 tablets by mouth Daily.  3 Patient Taking Differently    metoprolol tartrate (LOPRESSOR) 25 MG tablet Take 1 tablet by mouth Daily.       potassium chloride (K-DUR) 10 MEQ CR tablet Take 2 tablets by mouth Daily.   Patient Taking Differently    predniSONE (DELTASONE) 10 MG tablet Take 0.5 tablets by mouth Daily.   Patient Taking Differently    rOPINIRole (REQUIP) 1 MG tablet Take 1 tablet by mouth Every Night. Take 1 hour before bedtime.       tamsulosin (FLOMAX) 0.4 MG capsule 24 hr capsule Take 2 capsules by mouth Every Night.       vitamin D (ERGOCALCIFEROL) 1.25 MG (74727 UT) capsule capsule Take 1 capsule by mouth Every 7 (Seven) Days. 4 capsule 5     XARELTO 15 MG tablet Take 1 tablet by mouth Daily.   9/20/2023    ZETIA 10 MG tablet Take 1 tablet by mouth Daily.       zolpidem (AMBIEN) 10 MG tablet Take 1 tablet by mouth At Night As Needed.       amiodarone (PACERONE) 200 MG tablet Take 1 tablet by mouth 2 (Two) Times a Day.       cetirizine (ZyrTEC) 10 MG tablet Take 1 tablet by mouth Daily.       clindamycin (CLEOCIN) 300 MG capsule Take 1 capsule by mouth 2 (Two) Times a Day. (Patient not taking: Reported on 9/7/2023)       dapagliflozin Propanediol 10 MG tablet Take  by mouth.       Glucagon (Baqsimi One Pack) 3 MG/DOSE powder 1 each into the nostril(s) as directed by provider As Needed (Hypoglycemia). Apply intranasal if hypoglycemia 2 each 11     glucose blood (Accu-Chek Guide) test strip Test 6 times daily. DX CODE: E11.65 540 each 3     glucose monitor monitoring kit INES CONTOUR NEXT METER.  USE TO TEST 4 TIMES A DAY.  DX-E11.8 (Patient not taking: Reported on 9/7/2023) 1 each 11     Lancets misc Test 4 times daily , E11.649 360 each 3     Multiple Vitamins-Minerals (DAILY MULTIVITAMIN PO) Take 1 capsule by mouth daily.        nystatin (MYCOSTATIN) 340417 UNIT/GM powder Apply  topically to the appropriate area as directed 4 (Four) Times a Day.       Omega-3 Fatty Acids (FISH OIL) 1000 MG capsule capsule Take 1 capsule by mouth 2 (Two) Times a Day.       penicillin v potassium (VEETID) 500 MG tablet Take 1 tablet by mouth 4 (Four) Times a Day.       sulfamethoxazole-trimethoprim (BACTRIM DS,SEPTRA DS) 800-160 MG per tablet Take 1 tablet by mouth 2 (Two) Times a Day.        Allergies:  Patient has no known allergies.     CURRENT MEDICATIONS/OBJECTIVE/VS/PE:     Current Medications:     Current Facility-Administered Medications   Medication Dose Route Frequency Provider Last Rate Last Admin    acetaminophen (TYLENOL) tablet 650 mg  650 mg Oral Q4H PRN Elba Brown MD        Or    acetaminophen (TYLENOL) 160 MG/5ML oral solution 650 mg  650 mg Oral Q4H PRN Elba Brown MD        Or    acetaminophen (TYLENOL) suppository 650 mg  650 mg Rectal Q4H PRN Elba Brown MD        amiodarone (PACERONE) tablet 200 mg  200 mg Oral Q24H Juanita Saeed APRN   200 mg at 09/25/23 0858    amLODIPine (NORVASC) tablet 5 mg  5 mg Oral Q24H Elba Brown MD   5 mg at 09/25/23 0858    bumetanide (BUMEX) tablet 2 mg  2 mg Oral Daily Elba Brown MD   2 mg at 09/25/23 0857    Canagliflozin (INVOKANA) 300 MG tablet tablet 300 mg  300 mg Oral Daily Elba Brown MD   300 mg at 09/25/23 0858    cholecalciferol (VITAMIN D3) tablet 1,000 Units  1,000 Units Oral Daily Elba Brown MD   1,000 Units at 09/25/23 0858    dextrose (D50W) (25 g/50 mL) IV injection 25 g  25 g Intravenous Q15 Min PRN Elba Brown MD        dextrose (GLUTOSE) oral gel 15 g  15 g Oral Q15 Min PRN Elba Brown MD        dextrose 5 % and sodium chloride 0.45 % infusion  30 mL/hr Intravenous Continuous PRN Emery Healy MD        FLUoxetine (PROzac) capsule 10 mg  10 mg Oral Daily Elba Brown MD   10 mg at 09/25/23 0809    gentamicin (GARAMYCIN) 80 mg  in sodium chloride 0.9 % 100 mL IVPB  80 mg Intravenous Once Elba Brown MD        glucagon HCl (Diagnostic) injection 1 mg  1 mg Intramuscular Q15 Min PRN Elba Brown MD        haloperidol lactate (HALDOL) injection 3 mg  3 mg Intramuscular Once Elba Brown MD        hydrALAZINE (APRESOLINE) injection 20 mg  20 mg Intravenous Q6H PRN Elba Brown MD        HYDROcodone-acetaminophen (NORCO) 5-325 MG per tablet 1 tablet  1 tablet Oral Q6H PRN Elba Brown MD   1 tablet at 09/25/23 0856    hydroxychloroquine (PLAQUENIL) tablet 200 mg  200 mg Oral Daily Elba Brown MD   200 mg at 09/25/23 0858    Insulin Aspart (novoLOG) injection 0-14 Units  0-14 Units Subcutaneous TID AC Elba Brown MD        losartan (COZAAR) tablet 100 mg  100 mg Oral Daily Elba Brown MD   100 mg at 09/25/23 0858    melatonin tablet 5 mg  5 mg Oral Nightly PRN Elba Brown MD   5 mg at 09/24/23 2010    metoprolol succinate XL (TOPROL-XL) 24 hr tablet 50 mg  50 mg Oral Q PM Juanita Saeed APRN        morphine injection 2 mg  2 mg Intravenous Q4H PRN Elba Brown MD   2 mg at 09/23/23 1540    nitroglycerin (NITROSTAT) SL tablet 0.4 mg  0.4 mg Sublingual Q5 Min PRN Elba Brown MD        ondansetron (ZOFRAN) injection 4 mg  4 mg Intravenous Q6H PRN Elba Brown MD        ondansetron (ZOFRAN) tablet 4 mg  4 mg Oral Q6H PRN Elba Brown MD        pantoprazole (PROTONIX) 40 mg in 100mL NS IVPB  8 mg/hr Intravenous Continuous Elba Brown MD 20 mL/hr at 09/25/23 0857 8 mg/hr at 09/25/23 0857    predniSONE (DELTASONE) tablet 5 mg  5 mg Oral Daily Elba Brown MD   5 mg at 09/25/23 0858    rivaroxaban (XARELTO) tablet 15 mg  15 mg Oral Daily Zev Hector MD   15 mg at 09/25/23 0858    rOPINIRole (REQUIP) tablet 1 mg  1 mg Oral Nightly Elba Brown MD   1 mg at 09/24/23 2010    sodium chloride 0.9 % flush 10 mL  10 mL Intravenous PRN Elba Brown MD        sodium chloride 0.9 %  flush 10 mL  10 mL Intravenous Q12H Elba Brown MD   10 mL at 09/25/23 0859    sodium chloride 0.9 % flush 10 mL  10 mL Intravenous PRN Elba Brown MD        sodium chloride 0.9 % flush 10 mL  10 mL Intravenous Q12H Elba Brown MD   10 mL at 09/25/23 0857    sodium chloride 0.9 % flush 10 mL  10 mL Intravenous PRN Elba Brown MD        sodium chloride 0.9 % infusion 40 mL  40 mL Intravenous PRN Elba Brown MD        sodium chloride 0.9 % infusion 40 mL  40 mL Intravenous PRN Elba Brown MD        tamsulosin (FLOMAX) 24 hr capsule 0.8 mg  0.8 mg Oral Nightly Elba Brown MD   0.8 mg at 09/24/23 2010       Objective     Review of Systems:   Review of Systems   Constitutional:  Negative for chills, fatigue, fever and unexpected weight change.   HENT:  Negative for congestion, ear discharge, hearing loss, nosebleeds and sore throat.    Eyes:  Negative for pain, discharge and redness.   Respiratory:  Negative for cough, chest tightness, shortness of breath and wheezing.    Cardiovascular:  Negative for chest pain and palpitations.   Gastrointestinal:  Negative for abdominal distention, abdominal pain, blood in stool, constipation, diarrhea, nausea and vomiting.   Endocrine: Negative for cold intolerance, polydipsia, polyphagia and polyuria.   Genitourinary:  Negative for dysuria, flank pain, frequency, hematuria and urgency.   Musculoskeletal:  Negative for arthralgias, back pain, joint swelling and myalgias.   Skin:  Negative for color change, pallor and rash.   Neurological:  Negative for tremors, seizures, syncope, weakness and headaches.   Hematological:  Negative for adenopathy. Does not bruise/bleed easily.   Psychiatric/Behavioral:  Negative for behavioral problems, confusion, dysphoric mood, hallucinations and suicidal ideas. The patient is not nervous/anxious.      Physical Exam:   Temp:  [97.2 °F (36.2 °C)-98.2 °F (36.8 °C)] 98.1 °F (36.7 °C)  Heart Rate:  [58-97] 64  Resp:   [18-20] 18  BP: (123-162)/(55-74) 162/71     Physical Exam:  General Appearance:    Alert, cooperative, in no acute distress   Head:    Normocephalic, without obvious abnormality, atraumatic   Eyes:            Lids and lashes normal, conjunctivae and sclerae normal, no   icterus, no pallor, corneas clear, PERRLA   Ears:    Ears appear intact with no abnormalities noted   Throat:   No oral lesions, no thrush, oral mucosa moist   Neck:   No adenopathy, supple, trachea midline, no thyromegaly, no     carotid bruit, no JVD   Back:     No kyphosis present, no scoliosis present, no skin lesions,       erythema or scars, no tenderness to percussion or                   palpation,   range of motion normal   Lungs:     Clear to auscultation,respirations regular, even and                   unlabored    Heart:    Regular rhythm and normal rate, normal S1 and S2, no            murmur, no gallop, no rub, no click   Breast Exam:    Deferred   Abdomen:     Normal bowel sounds, no masses, no organomegaly, soft        nontender, nondistended, no guarding, no rebound                 tenderness   Genitalia:    Deferred   Extremities:   Moves all extremities well, no edema, no cyanosis, no              redness   Pulses:   Pulses palpable and equal bilaterally   Skin:   No bleeding, bruising or rash   Lymph nodes:   No palpable adenopathy   Neurologic:   Cranial nerves 2 - 12 grossly intact, sensation intact, DTR        present and equal bilaterally      Results Review:     Lab Results (last 24 hours)       Procedure Component Value Units Date/Time    POC Glucose Once [[id number]]  (Abnormal) Collected: 09/25/23 1040    Specimen: Blood Updated: 09/25/23 1059     Glucose 132 mg/dL      Comment: RN NotifiedOperator: [id number] MORAIMA Max ID: [id number]       POC Glucose Once [[id number]]  (Normal) Collected: 09/25/23 0523    Specimen: Blood Updated: 09/25/23 0552     Glucose 98 mg/dL      Comment: : [id number] HAN HINDS  TKMeter ID: [id number]       POC Glucose Once [[id number]]  (Abnormal) Collected: 09/24/23 1940    Specimen: Blood Updated: 09/24/23 1955     Glucose 144 mg/dL      Comment: RN NotifiedOperator: [id number] KARISSA MCDONOUGHMeter ID: [id number]       POC Glucose Once [[id number]]  (Abnormal) Collected: 09/24/23 1607    Specimen: Blood Updated: 09/24/23 1643     Glucose 133 mg/dL      Comment: RN NotifiedOperator: [id number] CHRIS FOSTERMeter ID: [id number]                I reviewed the patient's new clinical results.  I reviewed the patient's new imaging results and agree with the interpretation.     ASSESSMENT/PLAN:   ASSESSMENT: 1.  Coffee-ground emesis, resolved.  2.  Anemia, stable.  3.  Bradycardia s/p pacer placement    PLAN: 1.  Continue PPI and current supportive care  2.  Follow H&H closely and transfuse as needed  3.  Diet as tolerated  The risks, benefits, and alternatives of this procedure have been discussed with the patient or the responsible party- the patient understands and agrees to proceed.         Emery Healy MD  09/25/23  14:25 CDT

## 2023-09-25 NOTE — PROGRESS NOTES
"[facility] [place]   LOS: 4 days   Patient Care Team:  Maisha Toure MD as PCP - General (Family Medicine)  Gabriela Cagle MA (Inactive) as Medical Assistant  Stephany Lucas (Inactive) as Technician    Chief Complaint:    Chief Complaint   Patient presents with    Slow Heart Rate    Dizziness        Subjective     Interval History:   Patient Denies: no complaints today  Patient Complaints: no complaints today  History taken from: patient chart family    Patient denies any events overnight.  Vital signs stable.  Denies any cardiac complaints.  Denies any issues with bleeding.  Pacemaker site clean dry and intact.    Objective     Vital Sign Min/Max for last 24 hours  Temp  Min: 97.2 °F (36.2 °C)  Max: 98.2 °F (36.8 °C)   BP  Min: 123/55  Max: 162/71   Pulse  Min: 58  Max: 97   Resp  Min: 18  Max: 20   SpO2  Min: 92 %  Max: 94 %   Flow (L/min)  Min: 2  Max: 2   Weight  Min: 110 kg (242 lb 3.2 oz)  Max: 110 kg (242 lb 3.2 oz)     Flowsheet Rows      Flowsheet Row First Filed Value   Admission Height 185.4 cm (73\") Documented at 09/21/2023 0322   Admission Weight 112 kg (248 lb) Documented at 09/21/2023 0322              09/23/23  0323 09/24/23  0500 09/25/23  0614   Weight: 108 kg (238 lb 11.2 oz) 109 kg (240 lb 1.6 oz) 110 kg (242 lb 3.2 oz)       Physical Exam:  Physical Exam  Vitals and nursing note reviewed.   Constitutional:       General: He is not in acute distress.     Appearance: He is obese. He is not ill-appearing, toxic-appearing or diaphoretic.   HENT:      Head: Normocephalic.      Right Ear: External ear normal.      Left Ear: External ear normal.   Eyes:      General: Lids are normal.      Pupils: Pupils are equal, round, and reactive to light.   Neck:      Thyroid: No thyromegaly.      Vascular: No carotid bruit or JVD.      Trachea: No tracheal deviation.   Cardiovascular:      Rate and Rhythm: Normal rate and regular rhythm.      Heart sounds: Normal heart sounds. No " murmur heard.    No friction rub. No gallop.   Pulmonary:      Effort: Pulmonary effort is normal. No respiratory distress.      Breath sounds: Normal breath sounds. No stridor. No wheezing or rales.   Chest:      Chest wall: No tenderness.   Abdominal:      General: Bowel sounds are normal. There is no distension.      Palpations: Abdomen is soft.      Tenderness: There is no abdominal tenderness.   Musculoskeletal:      Right lower leg: No edema.      Left lower leg: No edema.   Skin:     General: Skin is warm and dry.      Findings: No erythema or rash.      Comments: Right subclavian pacemaker site clean dry and intact   Neurological:      Mental Status: He is alert and oriented to person, place, and time.      Cranial Nerves: No cranial nerve deficit.      Deep Tendon Reflexes: Reflexes are normal and symmetric. Reflexes normal.   Psychiatric:         Behavior: Behavior normal.         Thought Content: Thought content normal.         Judgment: Judgment normal.        Results Review:     Results from last 7 days   Lab Units 09/24/23  1209 09/23/23  1303 09/21/23  0643 09/21/23  0325   SODIUM mmol/L 140 141 139 141   POTASSIUM mmol/L 4.3 4.1 4.6 4.3   CHLORIDE mmol/L 103 102 102 105   CO2 mmol/L 28.0 30.0* 24.0 27.0   BUN mg/dL 39* 30* 35* 34*   CREATININE mg/dL 2.30* 2.01* 2.02* 1.92*   CALCIUM mg/dL 8.1* 8.4* 9.4 8.8   BILIRUBIN mg/dL 0.3 0.4  --  0.2   ALK PHOS U/L 48 48  --  50   ALT (SGPT) U/L 7 8  --  12   AST (SGOT) U/L 20 21  --  15   GLUCOSE mg/dL 113* 144* 191* 94       Estimated Creatinine Clearance: 37.7 mL/min (A) (by C-G formula based on SCr of 2.3 mg/dL (H)).    Results from last 7 days   Lab Units 09/21/23  0325   MAGNESIUM mg/dL 2.1             Results from last 7 days   Lab Units 09/24/23  1209 09/23/23  1303 09/21/23  1956 09/21/23  0325   WBC 10*3/mm3 7.08 9.64 11.88* 8.42   HEMOGLOBIN g/dL 10.2* 10.8* 11.9* 11.1*   PLATELETS 10*3/mm3 106* 108* 115* 124*       Results from last 7 days   Lab  Units 09/21/23 1956   INR  1.16     Lab Results   Component Value Date    PROBNP 1,048.0 (H) 09/21/2023       I/O last 3 completed shifts:  In: 970 [P.O.:970]  Out: 3000 [Urine:3000]    Cardiographics:  ECG/EMG Results (last 24 hours)       Procedure Component Value Units Date/Time    EP/CRM Study [[id number]] Resulted: 09/22/23 1218     Updated: 09/25/23 0733    Narrative:      Placement of temporary venous pacemaker.    : Poornima Doan MD., FACC, FACP, [facility]    INDICATION: Third-degree heart block    Procedure:  After risks, benefits, and alternatives of the above-mentioned   procedure were explained to the patient  detail, informed consent was   obtained both verbally and in writing. The patient was taken to Cardiac   Catheterization Suite where the RUE was prepped and draped in the usual   sterile fashion. The right internal jugular region was prepped and draped   in the usual sterile fashion. 2% lidocaine solution was used to infiltrate   the skin overlying the right internal jugular vein. Once adequate   anesthesia has been obtained, a thin-walled #18 gauge Argon needle was   used to cannulate the right internal jugular vein with ultrasound   guidance. A steel guidewire was then inserted through the needle into the   vessel without resistance. Small nick was then made in the skin and the   needle was removed. An 6 Welsh venous sheath was then advanced over the   guidewire into the vascular lumen without resistance. The guidewire and   dilator were then removed. The sheath was then flushed.  A balloontipped   temporary venous pacemaker wire was advanced into the RV successful   capture.  Patient tolerated the procedure well.  Surgical sheath in place.  TVP was sutured in place at 35 cm.    Complications: None    Blood loss: Less than 5 mL    Specimen: None    Assessment:  Access with placement of TVP via right IJ    Recommendations:  Chest x-ray  Admit to CCU  Permanent pacemaker in the morning     EP/CRM Study [[id number]] Resulted: 09/22/23 1611     Updated: 09/25/23 0754    Narrative:      Date of Procedure: 09/22/23    Referring Physician: Dr. Doan    /ELECTROPHYSIOLOGIST: Dr. Brown    PROCEDURE(S) PERFORMED:    Implantation of a dual-chamber permanent pacemaker.       INDICATIONS FOR PROCDEDURE: Symptomatic complete heart block requiring   transvenous temporary pacing    MEDICATION(S) GIVEN TO PATIENT DURING THIS PROCEDURE:  IV conscious sedation by anesthesia.  Gentamicin  Ancef.    DESCRIPTION(S) OF THE PROCEDURE: The patient was brought to the   cardiovascular laboratory in a fasting, non-sedated state. The risks and   benefits of conscious sedation and implantation of dual-chamber permanent   pacemaker were explained to the patient and written, informed consent was   obtained. The patient was then prepped and draped in the usual sterile   manner. The right infraclavicular fossa area was then anesthetized with 1%   Lidocaine and the skin was incised using a scalpel. The pocket was created   along the prepectoralis fascia using both sharp and blunt dissection.   Access to the left subclavian vein was then obtained x2 using the modified   Seldinger technique and two, 7-Indonesian sheaths were passed over 2   guidewires and advanced to the left subclavian vein. The right ventricular   and right atrial leads were then passed through the sheaths and advanced   to the low right ventricular septum and high right atrium where they were   screwed in position. The right ventricular lead pacing threshold was   measured at 0.5 volts at 0.5 milliseconds with an R wave measured at 6   millivolts and a lead impedance measured at 680 ohms. The right atrial   lead pacing threshold measured at 0.5 volts at 0.5 milliseconds with a P   wave measured at 2.9 millivolts and a lead impedance measured at 360 ohms.   The leads were attached to the prepectoralis fascia using a 2-0 silk and a   sewing ring. The leads were  attached to the generator, and the generator   was placed in the pocket after the pocket was irrigated with Ancef   solution. The pocket was closed with 2-0 Vicryl, and the skin was closed   with 3-0 Vicryl and 4-0 Vicryl. Steri-Strips and a pressure dressing were   then applied.  Right atrial lead  is 2088 TC/46 and serial   number [id number].  Implantation date 9/22/2023.  Right ventricular lead    is Abbott model #2088 TC/52 and serial number EE [id number].    Implantation date 9/22/2023.  Lead status with the pacemaker.    's Abbott model number PM 2272 and serial #[id number].    Implantation date 9/22/2023  Transvenous temporary pacemaker was removed and hemostasis was achieved.    COMPLICATIONS: None  ESTIMATED BLOOD LOSS: Minimal  SPECIMENS: None    FINAL PROGRAM PARAMETERS: The device was set at DDDR with a lower rate of   60 beats per minute and upper rate of 120 beats per minute with prolonged   A-V delay.     FINAL IMPRESSIONS:   1.   Successful implantation of a dual-chamber permanent pacemaker.     RECOMMENDATION(S):  The patient will be monitored on telemetry.      Elba Brown MD  09/22/23  16:11 CDT      Part of this note may be an electronic transcription/translation of spoken   language to printed text using the Dragon Dictation system.               Results for orders placed during the hospital encounter of 09/21/23    Adult Transthoracic Echo Limited W/ Cont if Necessary Per Protocol    Interpretation Summary    Left ventricular systolic function is normal. Left ventricular ejection fraction appears to be 51 - 55%.    Left ventricular wall thickness is consistent with mild concentric hypertrophy.    Left ventricular diastolic function was indeterminate.    The left atrial cavity is mildly dilated.    Estimated right ventricular systolic pressure from tricuspid regurgitation is normal (<35 mmHg).    The aortic valve is abnormal in structure. There is moderate  calcification of the aortic valve. No significant aortic valve regurgitation is present. Mild aortic valve stenosis is present. Mild restriction to aortic valve opening.      Imaging Results (Most Recent)       Procedure Component Value Units Date/Time    XR Chest 1 View [[id number]] Collected: 09/22/23 1807     Updated: 09/22/23 2048    Narrative:      INDICATION:  post PPM placement.    COMPARISON:  None relevant.    FINDINGS:  Cardiac size is not enlarged.  No pleural effusion or pneumothorax.  No dense  airspace consolidation. ICD is present.      Impression:      Negative for pneumothorax.      XR Abdomen KUB [[id number]] Collected: 09/22/23 1351     Updated: 09/22/23 1355    Narrative:      Single view of the KUB area, compared to the study from one day before, shows  resolution of gastric distention after placement of the orogastric tube.    No distended loops of large and small bowel are seen.  No large amount of free  air is seen.  No abnormal soft tissue mass is seen.    Stable calcifications are seen in the pelvis.    XR Abdomen KUB [[id number]] Collected: 09/21/23 1310     Updated: 09/21/23 1314    Narrative:      XR ABDOMEN KUB    HISTORY: vomiting    COMPARISON:    FINDINGS:    Distended stomach.  There is air seen throughout large and small bowel loops as  well..    No definite findings of free intraperitoneal air.    The osseous structures demonstrate no acute abnormality.    Calcifications in the left hemipelvis.  I cannot clear the distal left ureter.    Stool burden: moderate      Impression:      1. Distended stomach.  There is air seen throughout large and small bowel loops.  Partial gastric outlet obstruction or gastroparesis not excluded.  2.  Several calcification seen in the lower left pelvis.  These may be  phleboliths.  I cannot clear the distal left ureter.          XR Chest 1 View [[id number]] Collected: 09/21/23 1230     Updated: 09/21/23 1235    Narrative:      Single frontal view of  chest, compared to the study from 9/21/2023, shows  partial retraction of the right subclavian catheter with its tip now projecting  into the central left brachiocephalic vein across the midline.    A second catheter introduced through the right IJ approach appears unchanged.    Other findings are also unchanged.    XR Chest AP [[id number]] Collected: 09/21/23 0758     Updated: 09/21/23 0820    Narrative:      INDICATION:  TVP placement.    COMPARISON:  9/21/2023, 0319 hours    FINDINGS:  Small bore catheter is seen over the right upper chest with its tip projected  over the anticipated location of the proximal SVC.  This apparently represents a  PICC line.  Clinical correlation needed.    There is a TVP entering from the right jugular.  Tip is within the anticipated  location in the right ventricle.  There is no pneumothorax.  Lungs remain clear.    XR Chest 1 View [[id number]] Collected: 09/21/23 0339     Updated: 09/21/23 0342    Narrative:      XR CHEST 1 VIEW    HISTORY: bradycardia    COMPARISON: None.    FINDINGS:  Frontal view of the chest was obtained.    The lungs are clear.There are low lung volumes.  The cardiac silhouette is  enlarged. There is no significant pleural effusion or pneumothorax.    The osseous structures and surrounding soft tissues demonstrate no acute  abnormality.    Upper abdomen is unremarkable.        Impression:      1. No acute cardiopulmonary disease.                XR Chest 1 View    Result Date: 9/22/2023  Negative for pneumothorax.     XR Chest 1 View    Result Date: 9/21/2023  1. No acute cardiopulmonary disease.     XR Abdomen KUB    Result Date: 9/21/2023  1. Distended stomach.  There is air seen throughout large and small bowel loops. Partial gastric outlet obstruction or gastroparesis not excluded. 2.  Several calcification seen in the lower left pelvis.  These may be phleboliths.  I cannot clear the distal left ureter.      Medication Review:     Current  Facility-Administered Medications:     acetaminophen (TYLENOL) tablet 650 mg, 650 mg, Oral, Q4H PRN **OR** acetaminophen (TYLENOL) 160 MG/5ML oral solution 650 mg, 650 mg, Oral, Q4H PRN **OR** acetaminophen (TYLENOL) suppository 650 mg, 650 mg, Rectal, Q4H PRN, Elba Brown MD    amiodarone (PACERONE) tablet 200 mg, 200 mg, Oral, Q24H, Juanita Saeed APRN, 200 mg at 09/25/23 0858    amLODIPine (NORVASC) tablet 5 mg, 5 mg, Oral, Q24H, Elba Brown MD, 5 mg at 09/25/23 0858    bumetanide (BUMEX) tablet 2 mg, 2 mg, Oral, Daily, Elba Brown MD, 2 mg at 09/25/23 0857    Canagliflozin (INVOKANA) 300 MG tablet tablet 300 mg, 300 mg, Oral, Daily, Elba Brown MD, 300 mg at 09/25/23 0858    cholecalciferol (VITAMIN D3) tablet 1,000 Units, 1,000 Units, Oral, Daily, Elba Brown MD, 1,000 Units at 09/25/23 0858    dextrose (D50W) (25 g/50 mL) IV injection 25 g, 25 g, Intravenous, Q15 Min PRN, Elba Brown MD    dextrose (GLUTOSE) oral gel 15 g, 15 g, Oral, Q15 Min PRN, Elba Brown MD    dextrose 5 % and sodium chloride 0.45 % infusion, 30 mL/hr, Intravenous, Continuous PRN, Emery Healy MD    FLUoxetine (PROzac) capsule 10 mg, 10 mg, Oral, Daily, Elba Brown MD, 10 mg at 09/25/23 0858    gentamicin (GARAMYCIN) 80 mg in sodium chloride 0.9 % 100 mL IVPB, 80 mg, Intravenous, Once, Elba Brown MD    glucagon HCl (Diagnostic) injection 1 mg, 1 mg, Intramuscular, Q15 Min PRN, Elba Brown MD    haloperidol lactate (HALDOL) injection 3 mg, 3 mg, Intramuscular, Once, Elab Brown MD    hydrALAZINE (APRESOLINE) injection 20 mg, 20 mg, Intravenous, Q6H PRN, Elba Brown MD    HYDROcodone-acetaminophen (NORCO) 5-325 MG per tablet 1 tablet, 1 tablet, Oral, Q6H PRN, Elba Brown MD, 1 tablet at 09/25/23 0856    hydroxychloroquine (PLAQUENIL) tablet 200 mg, 200 mg, Oral, Daily, Elba Brown MD, 200 mg at 09/25/23 0858    Insulin Aspart (novoLOG) injection 0-14 Units,  0-14 Units, Subcutaneous, TID AC, Elba Brown MD    losartan (COZAAR) tablet 100 mg, 100 mg, Oral, Daily, Elba Brown MD, 100 mg at 09/25/23 0858    melatonin tablet 5 mg, 5 mg, Oral, Nightly PRN, Elba Brown MD, 5 mg at 09/24/23 2010    metoprolol succinate XL (TOPROL-XL) 24 hr tablet 50 mg, 50 mg, Oral, Q PM, Juanita Saeed, KAHLIL    morphine injection 2 mg, 2 mg, Intravenous, Q4H PRN, Elba Brown MD, 2 mg at 09/23/23 1540    nitroglycerin (NITROSTAT) SL tablet 0.4 mg, 0.4 mg, Sublingual, Q5 Min PRN, Elba Brown MD    ondansetron (ZOFRAN) injection 4 mg, 4 mg, Intravenous, Q6H PRN, Elba Brown MD    ondansetron (ZOFRAN) tablet 4 mg, 4 mg, Oral, Q6H PRN **OR** [DISCONTINUED] ondansetron (ZOFRAN) injection 4 mg, 4 mg, Intravenous, Q6H PRN, Elba Brown MD, 4 mg at 09/21/23 1140    pantoprazole (PROTONIX) 40 mg in 100mL NS IVPB, 8 mg/hr, Intravenous, Continuous, Elba Brown MD, Last Rate: 20 mL/hr at 09/25/23 0857, 8 mg/hr at 09/25/23 0857    predniSONE (DELTASONE) tablet 5 mg, 5 mg, Oral, Daily, Elba Brown MD, 5 mg at 09/25/23 0858    rivaroxaban (XARELTO) tablet 15 mg, 15 mg, Oral, Daily, Zev Hector MD, 15 mg at 09/25/23 0858    rOPINIRole (REQUIP) tablet 1 mg, 1 mg, Oral, Nightly, Elba Brown MD, 1 mg at 09/24/23 2010    [COMPLETED] Insert Peripheral IV, , , Once **AND** sodium chloride 0.9 % flush 10 mL, 10 mL, Intravenous, PRNKevin Muhammad, MD    sodium chloride 0.9 % flush 10 mL, 10 mL, Intravenous, Q12H, Elba Brown MD, 10 mL at 09/25/23 0859    sodium chloride 0.9 % flush 10 mL, 10 mL, Intravenous, PRNKevin Muhammad, MD    sodium chloride 0.9 % flush 10 mL, 10 mL, Intravenous, Q12H, Elba Brown MD, 10 mL at 09/25/23 0857    sodium chloride 0.9 % flush 10 mL, 10 mL, Intravenous, Kevin WEATHERS Muhammad, MD    sodium chloride 0.9 % infusion 40 mL, 40 mL, Intravenous, PRKevin TA Muhammad, MD    sodium chloride 0.9 % infusion 40 mL, 40 mL,  Intravenous, PRN, Elba Brown MD    tamsulosin (FLOMAX) 24 hr capsule 0.8 mg, 0.8 mg, Oral, Nightly, Elba Brown MD, 0.8 mg at 09/24/23 2010    Assessment & Plan       Third degree heart block    Stage 3b chronic kidney disease    Macrocytic anemia    Elevated troponin    Hypertension    Coffee ground emesis    #1.  Symptomatic complete heart block: 72-year-old male with history of paroxysmal atrial fibrillation status post EP study and atrial fibrillation.  He presented with beta-blocker and amiodarone found to have significant bradycardia arrhythmia slow rate.  He was brought into the ER complete heart block requiring external pacemaker and subsequent emergent transvenous pacemaker per Dr. Doan.  Patient was evaluated by Dr. Brown electrophysiologist and underwent dual-chamber pacemaker implantation on 9-.  -Pacemaker site is clean dry and intact without evidence of hematoma  -Postoperative instructions discussed with patient including no lifting right arm above shoulder, no lifting greater than 10 pounds, no driving, no showering until released.  Site is to remain clean dry and intact.  He will follow-up in our device clinic for dressing changes.  Appointment will be made for interrogation of the device clinic.  Questions and concerns were answered in detail.    #2.  Hypertension: Have been resumed.  Continue amlodipine, losartan, Toprol-XL.    #3.  Paroxysmal atrial fibrillation: History of EP study and atrial fibrillation. He remains in NSR. No further AF since 23rd.   -Resume amiodarone 200 mg daily  -Change metoprolol to 50 mg XL  -Xarelto 15 mg daily was resumed.  H&H stable today.  Patient denies issues with bleeding      Plan for disposition:Where: home and When:  today. Follow up wound check in 4 days. Dr. Brown 1 month.             This document has been electronically signed by KAHLIL Gutierrez on September 25, 2023 13:43 CDT    Electronically signed by KAHLIL Gutierrez,  09/25/23, 1:35 PM CDT.  I personally examined the patient chart was reviewed telemetry was reviewed  72 years old patient with a history of chronic renal insufficiency, atrial fibrillation s/p A-fib ablation on amiodarone and long-term oral anticoagulation elevated with a symptomatic complete heart block s/p permanent pacemaker.  Patient is pacing in the ventricle.  Chest x-ray no acute pathology was reported.  He had a brief episode of atrial fibrillation his beta-blocker was adjusted recommend to restart the amiodarone at 200 mg a day.    Review of system  No orthopnea no PND no chest pain dizziness or intermittent claudication nausea vomiting diarrhea polydipsia polyuria reported.  Physical exam  Vital was reviewed  Lungs clear to auscultation  Cardiovascular regular rate rhythm no S3 no pericardial rub  Abdomen soft nontender NABS  Remedies no cyanosis clubbing  GI: Gross focal abnormal    Clinical impression recommended  #1 symptomatic complete heart block #2 paroxysmal atrial atrial fibrillation status post repeat EP study and ablation #3 hypertension  72 years old patient who had history of paroxysmal atrial fibrillation s/p EP study and ablation was on beta-blocker and amiodarone had symptomatic complete heart block patient is s/p pacemaker.  The patient for brief episode of atrial fibrillation back to sinus rhythm.  We will   increase the dose of metoprolol to 50 mg and restart the patient back in amiodarone.  Continue amlodipine losartan and metoprolol for hypertension.  Resume oral anticoagulation  Will sign him off and see as an outpatient          This document has been electronically signed by Elba Brown MD on September 25, 2023 17:13 CDT

## 2023-09-26 NOTE — OUTREACH NOTE
Prep Survey      Flowsheet Row Responses   [facility] facility patient discharged from? [place]   Is LACE score < 7 ? No   Eligibility Readm Mgmt   Discharge diagnosis Third degree heart block   Does the patient have one of the following disease processes/diagnoses(primary or secondary)? Other   Does the patient have Home health ordered? No   Is there a DME ordered? No   Medication alerts for this patient See AVS   Prep survey completed? Yes            Beverly ENGLISH - Registered Nurse

## 2023-09-27 LAB
QT INTERVAL: 454 MS
QT INTERVAL: 558 MS
QTC INTERVAL: 380 MS
QTC INTERVAL: 460 MS
REF LAB TEST METHOD: NORMAL

## 2023-09-28 ENCOUNTER — CLINICAL SUPPORT (OUTPATIENT)
Dept: CARDIOLOGY | Facility: CLINIC | Age: 73
End: 2023-09-28
Payer: MEDICARE

## 2023-09-28 DIAGNOSIS — Z95.0 PRESENCE OF CARDIAC PACEMAKER: Primary | ICD-10-CM

## 2023-09-28 NOTE — PROGRESS NOTES
Patient here for wound check post op PPM 9/22/23. Right subclavian dressing removed, no drainage or redness at site, incisional edges well approximated. Area cleaned with betadine, steri strips applied and covered with large band aid. Patient to keep dressing dry and continue with left arm restrictions. Patient voiced understanding and will return next week for wound check.

## (undated) DEVICE — CATH PACE PACEL BIOPOLAR HEART CRV 5F 110CM RT

## (undated) DEVICE — INTRO SHEATH ART/FEM ENGAGE .038 6F12CM

## (undated) DEVICE — PAD E/S GRND SGL/FOIL 9FT/CORD DISP

## (undated) DEVICE — ELECTRODE,RT,STRESS,FOAM,50PK: Brand: MEDLINE

## (undated) DEVICE — BITEBLOCK ENDO W/STRAP 60F A/ LF DISP

## (undated) DEVICE — CVR PROB ULTRASND INTUIT RL 5.5X58IN STRL LF

## (undated) DEVICE — INTRO SHEATH ULTIMUM ACT 5F

## (undated) DEVICE — KT INTRO MINISTICK MAX W/GW NITNL/TUNG ECHO 4F 21G 7CM

## (undated) DEVICE — BAND BAG 36" X 25": Brand: STERIMED

## (undated) DEVICE — PK PM 60

## (undated) DEVICE — SINGLE-USE BIOPSY FORCEPS: Brand: RADIAL JAW 4

## (undated) DEVICE — PK CATH LAB 60